# Patient Record
Sex: MALE | Race: WHITE | NOT HISPANIC OR LATINO
[De-identification: names, ages, dates, MRNs, and addresses within clinical notes are randomized per-mention and may not be internally consistent; named-entity substitution may affect disease eponyms.]

---

## 2019-05-07 ENCOUNTER — APPOINTMENT (OUTPATIENT)
Dept: INTERNAL MEDICINE | Facility: CLINIC | Age: 64
End: 2019-05-07

## 2019-06-11 ENCOUNTER — APPOINTMENT (OUTPATIENT)
Dept: INTERNAL MEDICINE | Facility: CLINIC | Age: 64
End: 2019-06-11

## 2019-06-11 DIAGNOSIS — Z00.00 ENCOUNTER FOR GENERAL ADULT MEDICAL EXAMINATION W/OUT ABNORMAL FINDINGS: ICD-10-CM

## 2019-07-08 ENCOUNTER — OTHER (OUTPATIENT)
Age: 64
End: 2019-07-08

## 2019-08-26 ENCOUNTER — MESSAGE (OUTPATIENT)
Age: 64
End: 2019-08-26

## 2021-12-22 ENCOUNTER — NON-APPOINTMENT (OUTPATIENT)
Age: 66
End: 2021-12-22

## 2021-12-22 ENCOUNTER — APPOINTMENT (OUTPATIENT)
Dept: FAMILY MEDICINE | Facility: CLINIC | Age: 66
End: 2021-12-22

## 2021-12-22 VITALS
OXYGEN SATURATION: 92 % | BODY MASS INDEX: 26.68 KG/M2 | HEART RATE: 117 BPM | WEIGHT: 197 LBS | HEIGHT: 72 IN | TEMPERATURE: 98.1 F

## 2021-12-22 NOTE — HISTORY OF PRESENT ILLNESS
[FreeTextEntry1] : Pt is here for CPE. [de-identified] : 66y M w/ PMHx former smoker- quit 3 years x 50 years, presenting for

## 2022-01-03 ENCOUNTER — APPOINTMENT (OUTPATIENT)
Dept: INTERNAL MEDICINE | Facility: CLINIC | Age: 67
End: 2022-01-03

## 2022-01-03 VITALS
BODY MASS INDEX: 27.36 KG/M2 | OXYGEN SATURATION: 92 % | HEIGHT: 72 IN | TEMPERATURE: 98.2 F | WEIGHT: 202 LBS | HEART RATE: 91 BPM

## 2022-03-03 ENCOUNTER — APPOINTMENT (OUTPATIENT)
Dept: ORTHOPEDIC SURGERY | Facility: CLINIC | Age: 67
End: 2022-03-03

## 2022-03-03 DIAGNOSIS — M25.569 PAIN IN UNSPECIFIED KNEE: ICD-10-CM

## 2022-03-14 ENCOUNTER — APPOINTMENT (OUTPATIENT)
Dept: ORTHOPEDIC SURGERY | Facility: CLINIC | Age: 67
End: 2022-03-14

## 2022-09-09 ENCOUNTER — APPOINTMENT (OUTPATIENT)
Dept: PULMONOLOGY | Facility: CLINIC | Age: 67
End: 2022-09-09

## 2022-09-09 VITALS
HEART RATE: 81 BPM | BODY MASS INDEX: 29.8 KG/M2 | WEIGHT: 220 LBS | RESPIRATION RATE: 16 BRPM | SYSTOLIC BLOOD PRESSURE: 140 MMHG | OXYGEN SATURATION: 93 % | HEIGHT: 72 IN | DIASTOLIC BLOOD PRESSURE: 80 MMHG

## 2022-09-09 DIAGNOSIS — J44.1 CHRONIC OBSTRUCTIVE PULMONARY DISEASE WITH (ACUTE) EXACERBATION: ICD-10-CM

## 2022-09-09 PROCEDURE — 99204 OFFICE O/P NEW MOD 45 MIN: CPT

## 2022-09-09 RX ORDER — FLUTICASONE FUROATE, UMECLIDINIUM BROMIDE AND VILANTEROL TRIFENATATE 100; 62.5; 25 UG/1; UG/1; UG/1
100-62.5-25 POWDER RESPIRATORY (INHALATION)
Qty: 1 | Refills: 5 | Status: ACTIVE | COMMUNITY
Start: 2022-09-09 | End: 1900-01-01

## 2022-09-09 RX ORDER — ALBUTEROL 90 MCG
90 AEROSOL (GRAM) INHALATION
Refills: 0 | Status: ACTIVE | COMMUNITY

## 2022-09-09 RX ORDER — AZITHROMYCIN 250 MG/1
250 TABLET, FILM COATED ORAL
Qty: 6 | Refills: 3 | Status: ACTIVE | COMMUNITY
Start: 2022-09-09 | End: 1900-01-01

## 2022-09-09 RX ORDER — PREDNISONE 5 MG/1
5 TABLET ORAL
Qty: 42 | Refills: 4 | Status: ACTIVE | COMMUNITY
Start: 2022-09-09 | End: 1900-01-01

## 2022-09-09 NOTE — ASSESSMENT
[FreeTextEntry1] : 67 yo man comes for first time without any information\par Partial discharge info available from [facility]\par Quite short of breath\par Patient pleasant, but a poor historian,\par \par Referred by Dr Bynum \par \par Smoker of 50 years until 4 years ago\par Had chest injury at work of some kind, but stopped smoking\par Has had multiple visits to [facility] and to [facility] ED for dyspnea, trated for COPD exacerbation\par \par CXR negative but CT chest had shown mediastinal adenopathy\par Dr Garcia, from IR at [facility] did EBUS and biopsies in Feb 2022--reportedly negative but will get path\par \par Taking Dulera???? perhaps  but doesn’t know\par albuterol MDI as well\par \par Denies any other medications\par Denies allergies\par \par \par Imp\par 67 yo man professed exsmoker treated for COPD in past\par Mediastinal adenopathy noted earlier this year but biopsies pending\par Only says he is taking dulera and albuterol\par \par Probable COPD\par COPD exacerbation \par o2 sat at rest 93%\par \par Recommend\par Eventual PFTs but must treat for COPD first\par Start Trelegy\par d/c dulera\par Albuterol PRN \par Azithro\par Prednisone taper\par RTC one week\par Patient says he will see Dr Bynum on Monday for eval and for blood work

## 2022-09-09 NOTE — HISTORY OF PRESENT ILLNESS
[TextBox_4] : 65 yo man comes for first time without any information\par Partial discharge info available from [facility]\par Quite short of breath\par Patient pleasant, but a poor historian,\par \par Referred by Dr Bynum \par \par Smoker of 50 years until 4 years ago\par Had chest injury at work of some kind, but stopped smoking\par Has had multiple visits to [facility] and to [facility] ED for dyspnea, trated for COPD exacerbation\par \par CXR negative but CT chest had shown mediastinal adenopathy\par Dr Garcia, from IR at [facility] did EBUS and biopsies in Feb 2022--reportedly negative but will get path\par \par Taking Dulera???? perhaps  but doesn’t know\par albuterol MDI as well\par \par Denies any other medications\par Denies allergies\par \par

## 2022-09-09 NOTE — CONSULT LETTER
[Dear  ___] : Dear  [unfilled], [FreeTextEntry1] : I had the pleasure of evaluating your patient, PANDA SIM , in the office today.  Please review my consultation and evaluation report that follows below.  Please do not hesitate to call me if further information is necessary or if you wish to discuss ongoing care or diagnostic work-up.   \par I very much appreciate your referral and it is a privilege to be able to provide care for your patient.\par \par Sincerely,\par  \par Chaz Keenan MD, MHCM, FACP, YVONNE-C\par Pulmonary Medicine\par  of Medicine\par [employer] [facility] [employer] [facility] [place]\par [email or web address]\par \par [facility] in [place]\par [address]\[place]  [address]\par    Fax \par \par [facility] [place]\par [address]\par [place], [place] \par \par

## 2022-09-09 NOTE — PHYSICAL EXAM
[Normal Oropharynx] : normal oropharynx [Normal Appearance] : normal appearance [No Neck Mass] : no neck mass [Normal Rate/Rhythm] : normal rate/rhythm [Normal S1, S2] : normal s1, s2 [No Murmurs] : no murmurs [No Resp Distress] : no resp distress [Clear to Auscultation Bilaterally] : clear to auscultation bilaterally [No Abnormalities] : no abnormalities [Benign] : benign [Normal Gait] : normal gait [No Clubbing] : no clubbing [No Cyanosis] : no cyanosis [No Edema] : no edema [FROM] : FROM [Normal Color/ Pigmentation] : normal color/ pigmentation [No Focal Deficits] : no focal deficits [Oriented x3] : oriented x3 [Normal Affect] : normal affect [TextBox_2] : Tachypneic but not wheezing, does calm down periodically

## 2022-09-16 ENCOUNTER — APPOINTMENT (OUTPATIENT)
Dept: PULMONOLOGY | Facility: CLINIC | Age: 67
End: 2022-09-16

## 2022-09-16 VITALS
HEART RATE: 85 BPM | DIASTOLIC BLOOD PRESSURE: 76 MMHG | RESPIRATION RATE: 16 BRPM | SYSTOLIC BLOOD PRESSURE: 136 MMHG | OXYGEN SATURATION: 94 %

## 2022-09-16 DIAGNOSIS — J44.9 CHRONIC OBSTRUCTIVE PULMONARY DISEASE, UNSPECIFIED: ICD-10-CM

## 2022-09-16 PROCEDURE — 99214 OFFICE O/P EST MOD 30 MIN: CPT

## 2022-09-16 NOTE — CONSULT LETTER
[Dear  ___] : Dear  [unfilled], [FreeTextEntry1] : I had the pleasure of evaluating your patient, PANDA SIM , in the office today.  Please review my consultation and evaluation report that follows below.  Please do not hesitate to call me if further information is necessary or if you wish to discuss ongoing care or diagnostic work-up.   \par I very much appreciate your referral and it is a privilege to be able to provide care for your patient.\par \par Sincerely,\par  \par Chaz Keenan MD, MHCM, FACP, YVONNE-C\par Pulmonary Medicine\par  of Medicine\par [employer] [facility] [employer] [facility]\par [email or web address]\par \par [facility] in [place]\par [address]\[place]  [address]\par    Fax \par \par [facility] at [place]\par [address]\par [place], [place] \par \par

## 2022-09-16 NOTE — HISTORY OF PRESENT ILLNESS
[TextBox_4] : 65 yo man smoker for 50 years until about 5 yrs ago\par From Dr Bynum\par Seen last week with probable exacerbation of COPD\par Treated with Trelegy, antibiotics and short steroids\par Definitely better today tho still has some dyspnea\par \par Mediastinal adenopathy noted earlier this year, Dr Garcia did EBUS at [facility]--final result was negative for lymphoma, sarcoid, flow cytometry nondiagnostic\par \par Had evaluation and blood work by Dr Bynum earlier this week--pending

## 2022-09-16 NOTE — ASSESSMENT
[FreeTextEntry1] : 67 yo man smoker for 50 years until about 5 yrs ago\par From Dr Bynum\par Seen last week with probable exacerbation of COPD\par Treated with Trelegy, antibiotics and short steroids\par Definitely better today tho still has some dyspnea\par \par Mediastinal adenopathy noted earlier this year, Dr Garcia did EBUS at [facility]--final result was negative for lymphoma, sarcoid, flow cytometry nondiagnostic\par \par Had evaluation and blood work by Dr Bynum earlier this week--pending\par \par Imp\par 67 yo exsmoker with probable COPD\par Better after steroids \par Stay on Trelegy\par PRN albuterol\par Retrun now for PFTs\par Mediastinal adenopathy of uncertain etiology\par Need f/u CT eventually\par Obtain lab work and other info from Dr Bynum

## 2022-10-31 ENCOUNTER — APPOINTMENT (OUTPATIENT)
Dept: PULMONOLOGY | Facility: CLINIC | Age: 67
End: 2022-10-31

## 2023-02-27 ENCOUNTER — EMERGENCY (EMERGENCY)
Facility: HOSPITAL | Age: 68
LOS: 1 days | Discharge: HOME | End: 2023-02-27
Attending: EMERGENCY MEDICINE | Admitting: EMERGENCY MEDICINE
Payer: MEDICARE

## 2023-02-27 VITALS
HEIGHT: 72 IN | DIASTOLIC BLOOD PRESSURE: 84 MMHG | OXYGEN SATURATION: 95 % | SYSTOLIC BLOOD PRESSURE: 135 MMHG | HEART RATE: 110 BPM | TEMPERATURE: 98 F | RESPIRATION RATE: 18 BRPM | WEIGHT: 139.99 LBS

## 2023-02-27 VITALS
OXYGEN SATURATION: 95 % | SYSTOLIC BLOOD PRESSURE: 139 MMHG | DIASTOLIC BLOOD PRESSURE: 85 MMHG | HEART RATE: 105 BPM | RESPIRATION RATE: 20 BRPM | TEMPERATURE: 99 F

## 2023-02-27 PROCEDURE — 71046 X-RAY EXAM CHEST 2 VIEWS: CPT | Mod: 26

## 2023-02-27 PROCEDURE — 73110 X-RAY EXAM OF WRIST: CPT

## 2023-02-27 PROCEDURE — 99284 EMERGENCY DEPT VISIT MOD MDM: CPT

## 2023-02-27 PROCEDURE — 73130 X-RAY EXAM OF HAND: CPT

## 2023-02-27 PROCEDURE — 73110 X-RAY EXAM OF WRIST: CPT | Mod: 26,RT

## 2023-02-27 PROCEDURE — 99284 EMERGENCY DEPT VISIT MOD MDM: CPT | Mod: 25

## 2023-02-27 PROCEDURE — 73130 X-RAY EXAM OF HAND: CPT | Mod: 26,RT

## 2023-02-27 PROCEDURE — 71046 X-RAY EXAM CHEST 2 VIEWS: CPT

## 2023-02-27 NOTE — ED PROVIDER NOTE - CLINICAL SUMMARY MEDICAL DECISION MAKING FREE TEXT BOX
Patient is a 67-year-old male with multiple hospitalizations for COPD exacerbation and hypoxia [facility].  He works as a  part-time, is a remote former smoker and is oxygen dependent at home.  He follows with a pulmonologist and a primary care physician both at [facility].  He presents the emergency room today with a chief complaint of pain swelling and numbness about the right wrist on the ulnar aspect of the wrist.  He denies any recent trauma or injury.  He denies any fevers or chills.  He denies any cough.  He states his dyspnea and breathlessness are at his baseline and is declining any further care or evaluation of his shortness of breath.    On evaluation patient appears dyspneic sitting forward with pursed lip excursions with COPD appearance.  He declined facemask BiPAP, or other intervention or admission.  Stating I am always like this.  He is only asking for evaluation of his hand at this time.  Aside from the pursed lip excursions.  His lungs have good aeration with good air movement.  His pulse ox was 90-95 depending on the flow rate of the oxygen via nasal cannula.  He has no JVD.  His extremities reveal tenderness and swelling over the ulnar aspect of the wrist without evidence of infection.  He has pain on range of motion.  Pain on extension of the fourth and fifth fingers.  With concern for possible flexor contractures secondary to neurologic injury or insult patient was counseled he needs to follow-up with orthopedics.  He has negative Tinel's at his elbow or his wrist.  Negative Phalen's.  The cardiac exam is normal.  Abdominal exam is soft nontender.  The spine exam is severe kyphosis.  He has no calf pain or calf tenderness or calf swelling.    PET plan of care includes a offered to admit the patient for his severe dyspnea which she declines, oxygen therapy patient has a home oxygen concentrator and oxygen with him.  X-ray of the hand and wrist to rule out occult fracture.  X-ray reveals osteopenia and arthritis.  Without fracture.  This could be inflammatory versus arthritic pain.  Will provide steroid course for his pain, and for his breathing, Velcro cock-up wrist splint, referral to hand surgery.  Analgesics as required.  With the extreme caution with any opioid type and analgesia given his fragile  respiratory status.  This chart was made with dictation software and may contain typographical errors.

## 2023-02-27 NOTE — ED PROVIDER NOTE - OBJECTIVE STATEMENT
68 yo M PMHx COPD on 2-3L home O2 presents to ED c/o chronic R hand pain x weeks. Pt states he works as a . Denies specific trauma. Reports intermittent pain and tingling sensation to medial aspect of hand. Pt also noted to be tachypneic. States this is his baseline and that he feels well other than his hand pain. Pt denies chest pain/tightness, back pain, fever/chills, acute cough, N/V.

## 2023-02-27 NOTE — ED ADULT NURSE NOTE - OBJECTIVE STATEMENT
68y/o male A&ox4, ambulatory received in rm7a. pt c/o R hand pain/swelling since friday. denies recent injury, fall. no obvious injury noted at this time. pmhx copd, on home o2 2L nc at baseline. pt c/o PAREDES but states this is baseline. pt visibly tachypneic at this time. satting 95% on o2 at this time. MD at bedside for eval. bed in lowest position, side rails up, call bell in hand, safety maintained. awaiting further orders. will continue to monitor.

## 2023-02-27 NOTE — ED PROVIDER NOTE - PATIENT PORTAL LINK FT
You can access the FollowMyHealth Patient Portal offered by [facility] by registering at the following website: [email or web address]. By joining [facility]’s FollowMyHealth portal, you will also be able to view your health information using other applications (apps) compatible with our system.

## 2023-02-27 NOTE — ED PROVIDER NOTE - NSFOLLOWUPINSTRUCTIONS_ED_ALL_ED_FT
1) Follow up with ORTHO/HAND and PULMONOLOGY in 1-2 days.  2) Return to the ED immediately for new or worsening symptoms as we discussed.                                                             Hand Pain      Many things can cause hand pain. Some common causes are:  •An injury.      •Repeating the same movement with your hand over and over (overuse).      •Osteoporosis.      •Arthritis.      •Lumps in the tendons or joints of the hand and wrist (ganglion cysts).      •Nerve compression syndromes (carpal tunnel syndrome).      •Inflammation of the tendons (tendinitis).      •Infection.        Follow these instructions at home:    Pay attention to any changes in your symptoms. Take these actions to help with your discomfort:      Managing pain, stiffness, and swelling   A bag of ice on a towel on the skin.   •Take over-the-counter and prescription medicines only as told by your health care provider.      •Wear a hand splint or support as told by your health care provider.    •If directed, put ice on the affected area:  •Put ice in a plastic bag.      •Place a towel between your skin and the bag.      •Leave the ice on for 20 minutes, 2–3 times a day.        Activity     •Take breaks from repetitive activity often.      •Avoid activities that make your pain worse.      •Minimize stress on your hands and wrists as much as possible.      •Do stretches or exercises as told by your health care provider.      • Do not do activities that make your pain worse.        Contact a health care provider if:    •Your pain does not get better after a few days of self-care.      •Your pain gets worse.      •Your pain affects your ability to do your daily activities.        Get help right away if:    •Your hand becomes warm, red, or swollen.      •Your hand is numb or tingling.      •Your hand is extremely swollen or deformed.      •Your hand or fingers turn white or blue.      •You cannot move your hand, wrist, or fingers.        Summary    •Many things can cause hand pain.      •Contact your health care provider if your pain does not get better after a few days of self care.      •Minimize stress on your hands and wrists as much as possible.      • Do not do activities that make your pain worse.      This information is not intended to replace advice given to you by your health care provider. Make sure you discuss any questions you have with your health care provider.      Document Revised: 04/07/2022 Document Reviewed: 04/07/2022    Elsevier Patient Education © 2023 Elsevier Inc.

## 2023-02-27 NOTE — ED ADULT TRIAGE NOTE - CHIEF COMPLAINT QUOTE
patient ambulatory to ED A&Ox4 with c/o atraumatic right hand pain since friday and woke up with it numb today. pt with chronic o2 appears SOB but states this is normal for him. currently on 2L NC o2 sat 95

## 2023-02-27 NOTE — ED PROVIDER NOTE - PROGRESS NOTE DETAILS
pt refusing EKG, further workup of dyspnea, understands risks. see attending note.  pt placed in velcro splint for R hand  Pt was given an opportunity to have all questions answered to satisfaction.  Discussed the importance of prompt, close medical follow-up. ED return precautions discussed at length.  Pt verbalizes agreement and understanding of plan and ED return precautions. Pt well appearing, stable for DC home. No emergent concerns at this time.

## 2023-02-27 NOTE — ED PROVIDER NOTE - CARE PROVIDER_API CALL
Andrea Rodríguez)  Orthopaedic Surgery; Surgery of the Hand  [address] [place] [address]  Phone: [phone]  Fax: [phone]  Follow Up Time:

## 2023-02-27 NOTE — ED PROVIDER NOTE - ATTENDING APP SHARED VISIT CONTRIBUTION OF CARE
Patient is a 67-year-old male with multiple hospitalizations for COPD exacerbation and hypoxia [facility].  He works as a  part-time, is a remote former smoker and is oxygen dependent at home.  He follows with a pulmonologist and a primary care physician both at [facility].  He presents the emergency room today with a chief complaint of pain swelling and numbness about the right wrist on the ulnar aspect of the wrist.  He denies any recent trauma or injury.  He denies any fevers or chills.  He denies any cough.  He states his dyspnea and breathlessness are at his baseline and is declining any further care or evaluation of his shortness of breath.    On evaluation patient appears dyspneic sitting forward with pursed lip excursions with COPD appearance.  He declined facemask BiPAP, or other intervention or admission.  Stating I am always like this.  He is only asking for evaluation of his hand at this time.  Aside from the pursed lip excursions.  His lungs have good aeration with good air movement.  His pulse ox was 90-95 depending on the flow rate of the oxygen via nasal cannula.  He has no JVD.  His extremities reveal tenderness and swelling over the ulnar aspect of the wrist without evidence of infection.  He has pain on range of motion.  Pain on extension of the fourth and fifth fingers.  With concern for possible flexor contractures secondary to neurologic injury or insult patient was counseled he needs to follow-up with orthopedics.  He has negative Tinel's at his elbow or his wrist.  Negative Phalen's.  The cardiac exam is normal.  Abdominal exam is soft nontender.  The spine exam is severe kyphosis.  He has no calf pain or calf tenderness or calf swelling.    PET plan of care includes a offered to admit the patient for his severe dyspnea which she declines, oxygen therapy patient has a home oxygen concentrator and oxygen with him.  X-ray of the hand and wrist to rule out occult fracture.  X-ray reveals osteopenia and arthritis.  Without fracture.  This could be inflammatory versus arthritic pain.  Will provide steroid course for his pain, and for his breathing, Velcro cock-up wrist splint, referral to hand surgery.  Analgesics as required.  With the extreme caution with any opioid type and analgesia given his fragile  respiratory status.  This chart was made with dictation software and may contain typographical errors.

## 2023-03-02 ENCOUNTER — APPOINTMENT (OUTPATIENT)
Dept: ORTHOPEDIC SURGERY | Facility: CLINIC | Age: 68
End: 2023-03-02
Payer: MEDICARE

## 2023-03-02 VITALS — HEIGHT: 72 IN | WEIGHT: 230 LBS | BODY MASS INDEX: 31.15 KG/M2

## 2023-03-02 DIAGNOSIS — J45.909 UNSPECIFIED ASTHMA, UNCOMPLICATED: ICD-10-CM

## 2023-03-02 PROBLEM — J44.9 CHRONIC OBSTRUCTIVE PULMONARY DISEASE, UNSPECIFIED: Chronic | Status: ACTIVE | Noted: 2023-02-27

## 2023-03-02 PROCEDURE — 99203 OFFICE O/P NEW LOW 30 MIN: CPT

## 2023-03-02 NOTE — HISTORY OF PRESENT ILLNESS
[Gradual] : gradual [4] : 4 [2] : 2 [Dull/Aching] : dull/aching [Tingling] : tingling [Nothing helps with pain getting better] : Nothing helps with pain getting better [de-identified] : R hand swelling that comes/goes over the year\par Has SF and ulnar hand numbness\par \par  [] : no [FreeTextEntry1] : right hand/ SF  [FreeTextEntry3] : a year  [FreeTextEntry5] : he has pain, numbness and swelling, no injury  [FreeTextEntry6] : numbness [de-identified] : activity

## 2023-03-02 NOTE — PHYSICAL EXAM
[de-identified] : R elbow:\par Tender at the ulna nerve\par Pain with flexion\par +tinels at the ulna nerve\par +hyperflexion test\par \par R hand:\par Intrinsic weakness\par Decreased sensation in the ulna nerve distribution\par +lumbrical atrophy\par

## 2023-03-09 ENCOUNTER — APPOINTMENT (OUTPATIENT)
Dept: NEUROLOGY | Facility: CLINIC | Age: 68
End: 2023-03-09
Payer: MEDICARE

## 2023-03-09 DIAGNOSIS — R20.2 ANESTHESIA OF SKIN: ICD-10-CM

## 2023-03-09 DIAGNOSIS — G56.01 CARPAL TUNNEL SYNDROME, RIGHT UPPER LIMB: ICD-10-CM

## 2023-03-09 DIAGNOSIS — R20.0 ANESTHESIA OF SKIN: ICD-10-CM

## 2023-03-09 DIAGNOSIS — G56.21 LESION OF ULNAR NERVE, RIGHT UPPER LIMB: ICD-10-CM

## 2023-03-09 PROCEDURE — 95910 NRV CNDJ TEST 7-8 STUDIES: CPT

## 2023-03-09 PROCEDURE — 95886 MUSC TEST DONE W/N TEST COMP: CPT

## 2023-03-23 ENCOUNTER — APPOINTMENT (OUTPATIENT)
Dept: ORTHOPEDIC SURGERY | Facility: CLINIC | Age: 68
End: 2023-03-23

## 2023-11-28 ENCOUNTER — OFFICE (OUTPATIENT)
Dept: URBAN - METROPOLITAN AREA CLINIC 63 | Facility: CLINIC | Age: 68
Setting detail: OPHTHALMOLOGY
End: 2023-11-28
Payer: MEDICARE

## 2023-11-28 DIAGNOSIS — H25.12: ICD-10-CM

## 2023-11-28 DIAGNOSIS — H11.153: ICD-10-CM

## 2023-11-28 DIAGNOSIS — H25.13: ICD-10-CM

## 2023-11-28 DIAGNOSIS — H25.11: ICD-10-CM

## 2023-11-28 PROCEDURE — 92014 COMPRE OPH EXAM EST PT 1/>: CPT | Performed by: STUDENT IN AN ORGANIZED HEALTH CARE EDUCATION/TRAINING PROGRAM

## 2023-11-28 ASSESSMENT — CONFRONTATIONAL VISUAL FIELD TEST (CVF)
OS_FINDINGS: FULL
OD_FINDINGS: FULL

## 2023-11-28 ASSESSMENT — REFRACTION_MANIFEST
OS_VA1: 20/50
OS_SPHERE: -1.25
OU_VA: 20/50
OD_CYLINDER: -2.25
OD_CYLINDER: -1.75
OD_ADD: +2.50
OD_VA2: 20/20
OD_VA2: 20/25(J1)
OS_AXIS: 080
OD_SPHERE: -1.50
OD_VA1: 20/150
OS_AXIS: 081
OS_ADD: +2.50
OS_VA1: 20/70
OD_SPHERE: -1.50
OD_CYLINDER: -1.75
OS_VA2: 20/25(J1)
OD_SPHERE: -1.50
OD_ADD: +3.00
OS_AXIS: 080
OU_VA: 20/40
OD_VA1: 20/50
OD_AXIS: 100
OS_CYLINDER: -1.50
OS_VA2: 20/20
OD_AXIS: 100
OS_VA1: 20/50
OS_SPHERE: -0.75
OS_ADD: +3.00
OS_CYLINDER: -0.75
OS_CYLINDER: -1.50
OD_AXIS: 104
OS_SPHERE: -0.50
OD_VA1: 20/40

## 2023-11-28 ASSESSMENT — REFRACTION_CURRENTRX
OD_AXIS: 103
OD_OVR_VA: 20/
OD_CYLINDER: -1.75
OS_CYLINDER: -1.50
OS_VPRISM_DIRECTION: SV
OS_AXIS: 081
OS_OVR_VA: 20/
OS_SPHERE: -0.75
OD_SPHERE: -1.50
OD_VPRISM_DIRECTION: SV

## 2023-11-28 ASSESSMENT — REFRACTION_AUTOREFRACTION
OD_CYLINDER: -2.25
OD_SPHERE: -1.50
OS_CYLINDER: -0.75
OS_SPHERE: -1.25
OD_AXIS: 104
OS_AXIS: 081

## 2023-11-28 ASSESSMENT — SPHEQUIV_DERIVED
OD_SPHEQUIV: -2.375
OD_SPHEQUIV: -2.625
OS_SPHEQUIV: -1.5
OD_SPHEQUIV: -2.375
OS_SPHEQUIV: -1.625
OS_SPHEQUIV: -1.25
OD_SPHEQUIV: -2.625
OS_SPHEQUIV: -1.625

## 2024-01-09 ENCOUNTER — INPATIENT (INPATIENT)
Facility: HOSPITAL | Age: 69
LOS: 1 days | Discharge: HOME | DRG: 189 | End: 2024-01-11
Attending: INTERNAL MEDICINE | Admitting: INTERNAL MEDICINE
Payer: MEDICARE

## 2024-01-09 VITALS — OXYGEN SATURATION: 98 % | HEART RATE: 130 BPM

## 2024-01-09 DIAGNOSIS — J44.1 CHRONIC OBSTRUCTIVE PULMONARY DISEASE WITH (ACUTE) EXACERBATION: ICD-10-CM

## 2024-01-09 DIAGNOSIS — J96.01 ACUTE RESPIRATORY FAILURE WITH HYPOXIA: ICD-10-CM

## 2024-01-09 DIAGNOSIS — J96.21 ACUTE AND CHRONIC RESPIRATORY FAILURE WITH HYPOXIA: ICD-10-CM

## 2024-01-09 DIAGNOSIS — J18.9 PNEUMONIA, UNSPECIFIED ORGANISM: ICD-10-CM

## 2024-01-09 DIAGNOSIS — Z29.9 ENCOUNTER FOR PROPHYLACTIC MEASURES, UNSPECIFIED: ICD-10-CM

## 2024-01-09 LAB
ALBUMIN SERPL ELPH-MCNC: 3.2 G/DL — LOW (ref 3.3–5)
ALBUMIN SERPL ELPH-MCNC: 3.2 G/DL — LOW (ref 3.3–5)
ALP SERPL-CCNC: 92 U/L — SIGNIFICANT CHANGE UP (ref 40–120)
ALP SERPL-CCNC: 92 U/L — SIGNIFICANT CHANGE UP (ref 40–120)
ALT FLD-CCNC: 38 U/L — SIGNIFICANT CHANGE UP (ref 12–78)
ALT FLD-CCNC: 38 U/L — SIGNIFICANT CHANGE UP (ref 12–78)
ANION GAP SERPL CALC-SCNC: 6 MMOL/L — SIGNIFICANT CHANGE UP (ref 5–17)
ANION GAP SERPL CALC-SCNC: 6 MMOL/L — SIGNIFICANT CHANGE UP (ref 5–17)
APTT BLD: 34.7 SEC — SIGNIFICANT CHANGE UP (ref 24.5–35.6)
APTT BLD: 34.7 SEC — SIGNIFICANT CHANGE UP (ref 24.5–35.6)
AST SERPL-CCNC: 27 U/L — SIGNIFICANT CHANGE UP (ref 15–37)
AST SERPL-CCNC: 27 U/L — SIGNIFICANT CHANGE UP (ref 15–37)
BASE EXCESS BLDA CALC-SCNC: 1.2 MMOL/L — SIGNIFICANT CHANGE UP (ref -2–3)
BASE EXCESS BLDA CALC-SCNC: 1.2 MMOL/L — SIGNIFICANT CHANGE UP (ref -2–3)
BASE EXCESS BLDV CALC-SCNC: 1.5 MMOL/L — SIGNIFICANT CHANGE UP (ref -2–3)
BASE EXCESS BLDV CALC-SCNC: 1.5 MMOL/L — SIGNIFICANT CHANGE UP (ref -2–3)
BASOPHILS # BLD AUTO: 0.06 K/UL — SIGNIFICANT CHANGE UP (ref 0–0.2)
BASOPHILS # BLD AUTO: 0.06 K/UL — SIGNIFICANT CHANGE UP (ref 0–0.2)
BASOPHILS NFR BLD AUTO: 0.3 % — SIGNIFICANT CHANGE UP (ref 0–2)
BASOPHILS NFR BLD AUTO: 0.3 % — SIGNIFICANT CHANGE UP (ref 0–2)
BILIRUB SERPL-MCNC: 0.6 MG/DL — SIGNIFICANT CHANGE UP (ref 0.2–1.2)
BILIRUB SERPL-MCNC: 0.6 MG/DL — SIGNIFICANT CHANGE UP (ref 0.2–1.2)
BLOOD GAS COMMENTS ARTERIAL: SIGNIFICANT CHANGE UP
BLOOD GAS COMMENTS ARTERIAL: SIGNIFICANT CHANGE UP
BLOOD GAS COMMENTS, VENOUS: SIGNIFICANT CHANGE UP
BLOOD GAS COMMENTS, VENOUS: SIGNIFICANT CHANGE UP
BUN SERPL-MCNC: 20 MG/DL — SIGNIFICANT CHANGE UP (ref 7–23)
BUN SERPL-MCNC: 20 MG/DL — SIGNIFICANT CHANGE UP (ref 7–23)
CALCIUM SERPL-MCNC: 8.7 MG/DL — SIGNIFICANT CHANGE UP (ref 8.5–10.1)
CALCIUM SERPL-MCNC: 8.7 MG/DL — SIGNIFICANT CHANGE UP (ref 8.5–10.1)
CHLORIDE SERPL-SCNC: 107 MMOL/L — SIGNIFICANT CHANGE UP (ref 96–108)
CHLORIDE SERPL-SCNC: 107 MMOL/L — SIGNIFICANT CHANGE UP (ref 96–108)
CK SERPL-CCNC: 126 U/L — SIGNIFICANT CHANGE UP (ref 26–308)
CK SERPL-CCNC: 126 U/L — SIGNIFICANT CHANGE UP (ref 26–308)
CO2 SERPL-SCNC: 25 MMOL/L — SIGNIFICANT CHANGE UP (ref 22–31)
CO2 SERPL-SCNC: 25 MMOL/L — SIGNIFICANT CHANGE UP (ref 22–31)
CREAT SERPL-MCNC: 1 MG/DL — SIGNIFICANT CHANGE UP (ref 0.5–1.3)
CREAT SERPL-MCNC: 1 MG/DL — SIGNIFICANT CHANGE UP (ref 0.5–1.3)
D DIMER BLD IA.RAPID-MCNC: 267 NG/ML DDU — HIGH
D DIMER BLD IA.RAPID-MCNC: 267 NG/ML DDU — HIGH
EGFR: 81 ML/MIN/1.73M2 — SIGNIFICANT CHANGE UP
EGFR: 81 ML/MIN/1.73M2 — SIGNIFICANT CHANGE UP
EOSINOPHIL # BLD AUTO: 0.13 K/UL — SIGNIFICANT CHANGE UP (ref 0–0.5)
EOSINOPHIL # BLD AUTO: 0.13 K/UL — SIGNIFICANT CHANGE UP (ref 0–0.5)
EOSINOPHIL NFR BLD AUTO: 0.7 % — SIGNIFICANT CHANGE UP (ref 0–6)
EOSINOPHIL NFR BLD AUTO: 0.7 % — SIGNIFICANT CHANGE UP (ref 0–6)
GAS PNL BLDA: SIGNIFICANT CHANGE UP
GAS PNL BLDA: SIGNIFICANT CHANGE UP
GAS PNL BLDV: SIGNIFICANT CHANGE UP
GAS PNL BLDV: SIGNIFICANT CHANGE UP
GLUCOSE SERPL-MCNC: 134 MG/DL — HIGH (ref 70–99)
GLUCOSE SERPL-MCNC: 134 MG/DL — HIGH (ref 70–99)
HCO3 BLDA-SCNC: 26 MMOL/L — SIGNIFICANT CHANGE UP (ref 21–28)
HCO3 BLDA-SCNC: 26 MMOL/L — SIGNIFICANT CHANGE UP (ref 21–28)
HCO3 BLDV-SCNC: 27 MMOL/L — SIGNIFICANT CHANGE UP (ref 22–29)
HCO3 BLDV-SCNC: 27 MMOL/L — SIGNIFICANT CHANGE UP (ref 22–29)
HCOV PNL SPEC NAA+PROBE: DETECTED
HCOV PNL SPEC NAA+PROBE: DETECTED
HCT VFR BLD CALC: 39.8 % — SIGNIFICANT CHANGE UP (ref 39–50)
HCT VFR BLD CALC: 39.8 % — SIGNIFICANT CHANGE UP (ref 39–50)
HGB BLD-MCNC: 13.2 G/DL — SIGNIFICANT CHANGE UP (ref 13–17)
HGB BLD-MCNC: 13.2 G/DL — SIGNIFICANT CHANGE UP (ref 13–17)
IMM GRANULOCYTES NFR BLD AUTO: 0.4 % — SIGNIFICANT CHANGE UP (ref 0–0.9)
IMM GRANULOCYTES NFR BLD AUTO: 0.4 % — SIGNIFICANT CHANGE UP (ref 0–0.9)
INR BLD: 1.05 RATIO — SIGNIFICANT CHANGE UP (ref 0.85–1.18)
INR BLD: 1.05 RATIO — SIGNIFICANT CHANGE UP (ref 0.85–1.18)
LACTATE SERPL-SCNC: 1.6 MMOL/L — SIGNIFICANT CHANGE UP (ref 0.7–2)
LACTATE SERPL-SCNC: 1.6 MMOL/L — SIGNIFICANT CHANGE UP (ref 0.7–2)
LYMPHOCYTES # BLD AUTO: 1.75 K/UL — SIGNIFICANT CHANGE UP (ref 1–3.3)
LYMPHOCYTES # BLD AUTO: 1.75 K/UL — SIGNIFICANT CHANGE UP (ref 1–3.3)
LYMPHOCYTES # BLD AUTO: 10 % — LOW (ref 13–44)
LYMPHOCYTES # BLD AUTO: 10 % — LOW (ref 13–44)
MAGNESIUM SERPL-MCNC: 2 MG/DL — SIGNIFICANT CHANGE UP (ref 1.6–2.6)
MAGNESIUM SERPL-MCNC: 2 MG/DL — SIGNIFICANT CHANGE UP (ref 1.6–2.6)
MCHC RBC-ENTMCNC: 29 PG — SIGNIFICANT CHANGE UP (ref 27–34)
MCHC RBC-ENTMCNC: 29 PG — SIGNIFICANT CHANGE UP (ref 27–34)
MCHC RBC-ENTMCNC: 33.2 GM/DL — SIGNIFICANT CHANGE UP (ref 32–36)
MCHC RBC-ENTMCNC: 33.2 GM/DL — SIGNIFICANT CHANGE UP (ref 32–36)
MCV RBC AUTO: 87.5 FL — SIGNIFICANT CHANGE UP (ref 80–100)
MCV RBC AUTO: 87.5 FL — SIGNIFICANT CHANGE UP (ref 80–100)
MONOCYTES # BLD AUTO: 0.98 K/UL — HIGH (ref 0–0.9)
MONOCYTES # BLD AUTO: 0.98 K/UL — HIGH (ref 0–0.9)
MONOCYTES NFR BLD AUTO: 5.6 % — SIGNIFICANT CHANGE UP (ref 2–14)
MONOCYTES NFR BLD AUTO: 5.6 % — SIGNIFICANT CHANGE UP (ref 2–14)
NEUTROPHILS # BLD AUTO: 14.43 K/UL — HIGH (ref 1.8–7.4)
NEUTROPHILS # BLD AUTO: 14.43 K/UL — HIGH (ref 1.8–7.4)
NEUTROPHILS NFR BLD AUTO: 83 % — HIGH (ref 43–77)
NEUTROPHILS NFR BLD AUTO: 83 % — HIGH (ref 43–77)
NRBC # BLD: 0 /100 WBCS — SIGNIFICANT CHANGE UP (ref 0–0)
NRBC # BLD: 0 /100 WBCS — SIGNIFICANT CHANGE UP (ref 0–0)
NT-PROBNP SERPL-SCNC: 32 PG/ML — SIGNIFICANT CHANGE UP (ref 0–125)
NT-PROBNP SERPL-SCNC: 32 PG/ML — SIGNIFICANT CHANGE UP (ref 0–125)
PCO2 BLDA: 42 MMHG — SIGNIFICANT CHANGE UP (ref 35–48)
PCO2 BLDA: 42 MMHG — SIGNIFICANT CHANGE UP (ref 35–48)
PCO2 BLDV: 45 MMHG — SIGNIFICANT CHANGE UP (ref 42–55)
PCO2 BLDV: 45 MMHG — SIGNIFICANT CHANGE UP (ref 42–55)
PH BLDA: 7.4 — SIGNIFICANT CHANGE UP (ref 7.35–7.45)
PH BLDA: 7.4 — SIGNIFICANT CHANGE UP (ref 7.35–7.45)
PH BLDV: 7.38 — SIGNIFICANT CHANGE UP (ref 7.32–7.43)
PH BLDV: 7.38 — SIGNIFICANT CHANGE UP (ref 7.32–7.43)
PLATELET # BLD AUTO: 193 K/UL — SIGNIFICANT CHANGE UP (ref 150–400)
PLATELET # BLD AUTO: 193 K/UL — SIGNIFICANT CHANGE UP (ref 150–400)
PO2 BLDA: 179 MMHG — HIGH (ref 83–108)
PO2 BLDA: 179 MMHG — HIGH (ref 83–108)
PO2 BLDV: 208 MMHG — HIGH (ref 25–45)
PO2 BLDV: 208 MMHG — HIGH (ref 25–45)
POTASSIUM SERPL-MCNC: 4.1 MMOL/L — SIGNIFICANT CHANGE UP (ref 3.5–5.3)
POTASSIUM SERPL-MCNC: 4.1 MMOL/L — SIGNIFICANT CHANGE UP (ref 3.5–5.3)
POTASSIUM SERPL-SCNC: 4.1 MMOL/L — SIGNIFICANT CHANGE UP (ref 3.5–5.3)
POTASSIUM SERPL-SCNC: 4.1 MMOL/L — SIGNIFICANT CHANGE UP (ref 3.5–5.3)
PROT SERPL-MCNC: 7.3 G/DL — SIGNIFICANT CHANGE UP (ref 6–8.3)
PROT SERPL-MCNC: 7.3 G/DL — SIGNIFICANT CHANGE UP (ref 6–8.3)
PROTHROM AB SERPL-ACNC: 12.3 SEC — SIGNIFICANT CHANGE UP (ref 9.5–13)
PROTHROM AB SERPL-ACNC: 12.3 SEC — SIGNIFICANT CHANGE UP (ref 9.5–13)
RAPID RVP RESULT: DETECTED
RAPID RVP RESULT: DETECTED
RBC # BLD: 4.55 M/UL — SIGNIFICANT CHANGE UP (ref 4.2–5.8)
RBC # BLD: 4.55 M/UL — SIGNIFICANT CHANGE UP (ref 4.2–5.8)
RBC # FLD: 15.2 % — HIGH (ref 10.3–14.5)
RBC # FLD: 15.2 % — HIGH (ref 10.3–14.5)
SAO2 % BLDA: 99.9 % — HIGH (ref 94–98)
SAO2 % BLDA: 99.9 % — HIGH (ref 94–98)
SAO2 % BLDV: 99.4 % — HIGH (ref 67–88)
SAO2 % BLDV: 99.4 % — HIGH (ref 67–88)
SARS-COV-2 RNA SPEC QL NAA+PROBE: SIGNIFICANT CHANGE UP
SARS-COV-2 RNA SPEC QL NAA+PROBE: SIGNIFICANT CHANGE UP
SODIUM SERPL-SCNC: 138 MMOL/L — SIGNIFICANT CHANGE UP (ref 135–145)
SODIUM SERPL-SCNC: 138 MMOL/L — SIGNIFICANT CHANGE UP (ref 135–145)
TROPONIN I, HIGH SENSITIVITY RESULT: 7.6 NG/L — SIGNIFICANT CHANGE UP
TROPONIN I, HIGH SENSITIVITY RESULT: 7.6 NG/L — SIGNIFICANT CHANGE UP
WBC # BLD: 17.42 K/UL — HIGH (ref 3.8–10.5)
WBC # BLD: 17.42 K/UL — HIGH (ref 3.8–10.5)
WBC # FLD AUTO: 17.42 K/UL — HIGH (ref 3.8–10.5)
WBC # FLD AUTO: 17.42 K/UL — HIGH (ref 3.8–10.5)

## 2024-01-09 PROCEDURE — 99291 CRITICAL CARE FIRST HOUR: CPT

## 2024-01-09 PROCEDURE — 71045 X-RAY EXAM CHEST 1 VIEW: CPT | Mod: 26

## 2024-01-09 PROCEDURE — 99223 1ST HOSP IP/OBS HIGH 75: CPT | Mod: GC

## 2024-01-09 PROCEDURE — 93010 ELECTROCARDIOGRAM REPORT: CPT

## 2024-01-09 RX ORDER — LANOLIN ALCOHOL/MO/W.PET/CERES
3 CREAM (GRAM) TOPICAL AT BEDTIME
Refills: 0 | Status: DISCONTINUED | OUTPATIENT
Start: 2024-01-09 | End: 2024-01-11

## 2024-01-09 RX ORDER — INFLUENZA VIRUS VACCINE 15; 15; 15; 15 UG/.5ML; UG/.5ML; UG/.5ML; UG/.5ML
0.7 SUSPENSION INTRAMUSCULAR ONCE
Refills: 0 | Status: DISCONTINUED | OUTPATIENT
Start: 2024-01-09 | End: 2024-01-11

## 2024-01-09 RX ORDER — CEFTRIAXONE 500 MG/1
1000 INJECTION, POWDER, FOR SOLUTION INTRAMUSCULAR; INTRAVENOUS ONCE
Refills: 0 | Status: COMPLETED | OUTPATIENT
Start: 2024-01-09 | End: 2024-01-09

## 2024-01-09 RX ORDER — ENOXAPARIN SODIUM 100 MG/ML
40 INJECTION SUBCUTANEOUS EVERY 24 HOURS
Refills: 0 | Status: DISCONTINUED | OUTPATIENT
Start: 2024-01-09 | End: 2024-01-11

## 2024-01-09 RX ORDER — BUDESONIDE AND FORMOTEROL FUMARATE DIHYDRATE 160; 4.5 UG/1; UG/1
2 AEROSOL RESPIRATORY (INHALATION)
Refills: 0 | Status: DISCONTINUED | OUTPATIENT
Start: 2024-01-09 | End: 2024-01-11

## 2024-01-09 RX ORDER — CEFTRIAXONE 500 MG/1
1000 INJECTION, POWDER, FOR SOLUTION INTRAMUSCULAR; INTRAVENOUS EVERY 24 HOURS
Refills: 0 | Status: DISCONTINUED | OUTPATIENT
Start: 2024-01-10 | End: 2024-01-10

## 2024-01-09 RX ORDER — AZITHROMYCIN 500 MG/1
500 TABLET, FILM COATED ORAL EVERY 24 HOURS
Refills: 0 | Status: DISCONTINUED | OUTPATIENT
Start: 2024-01-10 | End: 2024-01-10

## 2024-01-09 RX ORDER — AZITHROMYCIN 500 MG/1
500 TABLET, FILM COATED ORAL ONCE
Refills: 0 | Status: COMPLETED | OUTPATIENT
Start: 2024-01-09 | End: 2024-01-09

## 2024-01-09 RX ORDER — IPRATROPIUM/ALBUTEROL SULFATE 18-103MCG
3 AEROSOL WITH ADAPTER (GRAM) INHALATION EVERY 6 HOURS
Refills: 0 | Status: DISCONTINUED | OUTPATIENT
Start: 2024-01-09 | End: 2024-01-11

## 2024-01-09 RX ORDER — IPRATROPIUM/ALBUTEROL SULFATE 18-103MCG
3 AEROSOL WITH ADAPTER (GRAM) INHALATION
Refills: 0 | Status: COMPLETED | OUTPATIENT
Start: 2024-01-09 | End: 2024-01-09

## 2024-01-09 RX ORDER — ACETAMINOPHEN 500 MG
650 TABLET ORAL EVERY 6 HOURS
Refills: 0 | Status: DISCONTINUED | OUTPATIENT
Start: 2024-01-09 | End: 2024-01-11

## 2024-01-09 RX ORDER — MAGNESIUM SULFATE 500 MG/ML
2 VIAL (ML) INJECTION ONCE
Refills: 0 | Status: COMPLETED | OUTPATIENT
Start: 2024-01-09 | End: 2024-01-09

## 2024-01-09 RX ADMIN — Medication 200 MILLIGRAM(S): at 23:38

## 2024-01-09 RX ADMIN — AZITHROMYCIN 255 MILLIGRAM(S): 500 TABLET, FILM COATED ORAL at 15:09

## 2024-01-09 RX ADMIN — Medication 3 MILLILITER(S): at 19:44

## 2024-01-09 RX ADMIN — Medication 3 MILLILITER(S): at 14:25

## 2024-01-09 RX ADMIN — Medication 3 MILLILITER(S): at 14:18

## 2024-01-09 RX ADMIN — Medication 200 MILLIGRAM(S): at 17:05

## 2024-01-09 RX ADMIN — Medication 125 MILLIGRAM(S): at 14:17

## 2024-01-09 RX ADMIN — Medication 150 GRAM(S): at 14:32

## 2024-01-09 RX ADMIN — Medication 100 MILLIGRAM(S): at 22:13

## 2024-01-09 RX ADMIN — BUDESONIDE AND FORMOTEROL FUMARATE DIHYDRATE 2 PUFF(S): 160; 4.5 AEROSOL RESPIRATORY (INHALATION) at 19:44

## 2024-01-09 RX ADMIN — Medication 3 MILLILITER(S): at 14:19

## 2024-01-09 RX ADMIN — CEFTRIAXONE 100 MILLIGRAM(S): 500 INJECTION, POWDER, FOR SOLUTION INTRAMUSCULAR; INTRAVENOUS at 15:05

## 2024-01-09 RX ADMIN — Medication 3 MILLIGRAM(S): at 22:13

## 2024-01-09 RX ADMIN — ENOXAPARIN SODIUM 40 MILLIGRAM(S): 100 INJECTION SUBCUTANEOUS at 17:05

## 2024-01-09 NOTE — H&P ADULT - NSHPSOCIALHISTORY_GEN_ALL_CORE
Tobacco: denies  EtOH: denies  Recreational drugs: denies  Lives with: alone  Ambulation: independent  ADLs: Dependent

## 2024-01-09 NOTE — H&P ADULT - HISTORY OF PRESENT ILLNESS
67 yo M PMH COPD (on 3L home O2, hx multiple exacerbations treated at [facility] this year), presenting with respiratory distress, sob, cough x3-4 days, occasionally productive of clear sputum. Denies sick contacts, recent travel, recent illness. Denies fevers/chills, HA, cp, palpitations, n/v/d/c, dysuria, dyschezia, hematuria, hematochezia. Endorses this current episode feels very similar to prior COPD exacerbations.     ED Course:  Vitals: T not measured, , BP not measured, RR 40, SpO2 86% ORA --> T 99.2 F, , /74, RR 20, SpO2 97% on BIPAP 12/5, FiO2 40%  Labs: WBC 17.42, N-Lebron 83%, pO2 arterial 179  EKG: excessive noise, unable to make personal read, reordered  Given: Azithromycin 500 mg IV x1, Rocephin 1g IV x1, Magnesium 2g IV x1, Duoneb x3, Solu-Medrol x1  CXR: Clear lungs with no acute cardiopulmonary abnormalities. 67 yo M PMH COPD (on 3L home O2, hx multiple exacerbations treated at [facility] this year), presenting with respiratory distress, sob, cough x3-4 days, occasionally productive of clear sputum. Denies sick contacts, recent travel, recent illness. Denies fevers/chills, HA, cp, palpitations, n/v/d/c, dysuria, dyschezia, hematuria, hematochezia. Endorses this current episode feels very similar to prior COPD exacerbations.     ED Course:  Vitals: T not measured, , BP not measured, RR 40, SpO2 86% ORA --> T 99.2 F, , /74, RR 20, SpO2 97% on BIPAP 12/5, FiO2 40%  Labs: WBC 17.42, N-Lebron 83%, pO2 arterial 179  EKG: excessive noise, unable to make personal read, reordered  Given: Azithromycin 500 mg IV x1, Rocephin 1g IV x1, Magnesium 2g IV x1, Duoneb x3, Solu-Medrol x1  CXR: Clear lungs with no acute cardiopulmonary abnormalities.

## 2024-01-09 NOTE — H&P ADULT - PROBLEM SELECTOR PLAN 2
- Pt on 3L home O2, reports 9 exacerbations (treated at [facility]) in past year  - Plan as above; c/w Solumedrol, Duoneb, Azithromycin, Rocephin - Pt on 3L home O2, reports 9 exacerbations (treated at [facility]) in past year  - Plan as above; c/w Solumedrol, Duoneb, Azithromycin, Rocephin

## 2024-01-09 NOTE — CONSULT NOTE ADULT - SUBJECTIVE AND OBJECTIVE BOX
Date/Time Patient Seen:  		  Referring MD:   Data Reviewed	       Patient is a 68y old  Male who presents with a chief complaint of acute hypoxic respiratory failure (09 Jan 2024 15:59)      Subjective/HPI   69 yo M PMH COPD (on 3L home O2, hx multiple exacerbations treated at [facility] this year), presenting with respiratory distress, sob, cough x3-4 days, occasionally productive of clear sputum.  PAST MEDICAL & SURGICAL HISTORY:  COPD, severe    PAST MEDICAL HISTORY:  COPD, severe.     Social History:  · Substance use	No  · Social History (marital status, living situation, occupation, and sexual history)	Tobacco: denies  EtOH: denies  Recreational drugs: denies  Lives with: alone  Ambulation: independent  ADLs: Dependent     Tobacco Screening:  · Core Measure Site	Yes  · Has the patient used tobacco in the past 30 days?	No    Risk Assessment:    Present on Admission:  Deep Venous Thrombosis	no  Pulmonary Embolus	no     HIV Screening:  · In accordance with [place] State law, we offer every patient who comes to our ED an HIV test. Would you like to be tested today?	Opt out        Medication list         MEDICATIONS  (STANDING):  albuterol/ipratropium for Nebulization 3 milliLiter(s) Nebulizer every 6 hours  azithromycin  IVPB 500 milliGRAM(s) IV Intermittent every 24 hours  benzonatate 100 milliGRAM(s) Oral three times a day  budesonide 160 MICROgram(s)/formoterol 4.5 MICROgram(s) Inhaler 2 Puff(s) Inhalation two times a day  cefTRIAXone   IVPB 1000 milliGRAM(s) IV Intermittent every 24 hours  enoxaparin Injectable 40 milliGRAM(s) SubCutaneous every 24 hours  guaiFENesin Oral Liquid (Sugar-Free) 200 milliGRAM(s) Oral every 6 hours  methylPREDNISolone sodium succinate Injectable 40 milliGRAM(s) IV Push two times a day    MEDICATIONS  (PRN):  acetaminophen     Tablet .. 650 milliGRAM(s) Oral every 6 hours PRN Temp greater or equal to 38C (100.4F), Mild Pain (1 - 3)  aluminum hydroxide/magnesium hydroxide/simethicone Suspension 30 milliLiter(s) Oral every 4 hours PRN Dyspepsia  melatonin 3 milliGRAM(s) Oral at bedtime PRN Insomnia         Vitals log        ICU Vital Signs Last 24 Hrs  T(C): 37.3 (09 Jan 2024 15:20), Max: 37.3 (09 Jan 2024 15:20)  T(F): 99.2 (09 Jan 2024 15:20), Max: 99.2 (09 Jan 2024 15:20)  HR: 98 (09 Jan 2024 16:47) (98 - 140)  BP: 112/74 (09 Jan 2024 15:20) (112/74 - 112/74)  BP(mean): --  ABP: --  ABP(mean): --  RR: 20 (09 Jan 2024 15:20) (20 - 40)  SpO2: 96% (09 Jan 2024 16:47) (86% - 98%)    O2 Parameters below as of 09 Jan 2024 15:20  Patient On (Oxygen Delivery Method): BiPAP/CPAP                 Input and Output:  I&O's Detail      Lab Data                        13.2   17.42 )-----------( 193      ( 09 Jan 2024 13:25 )             39.8     01-09    138  |  107  |  20  ----------------------------<  134<H>  4.1   |  25  |  1.00    Ca    8.7      09 Jan 2024 13:25  Mg     2.0     01-09    TPro  7.3  /  Alb  3.2<L>  /  TBili  0.6  /  DBili  x   /  AST  27  /  ALT  38  /  AlkPhos  92  01-09    ABG - ( 09 Jan 2024 13:54 )  pH, Arterial: 7.40  pH, Blood: x     /  pCO2: 42    /  pO2: 179   / HCO3: 26    / Base Excess: 1.2   /  SaO2: 99.9              CARDIAC MARKERS ( 09 Jan 2024 13:25 )  x     / x     / 126 U/L / x     / x            Review of Systems	  cough  weakness  resp distress      Objective     Physical Examination    heart s1s2  lung dc BS  hea dnc      Pertinent Lab findings & Imaging      Flores:  NO   Adequate UO     I&O's Detail           Discussed with:     Cultures:	        Radiology          ACC: [id number] EXAM:  XR CHEST PORTABLE IMMED 1V   ORDERED BY: MALCOLM LAWRENCE     PROCEDURE DATE:  01/09/2024          INTERPRETATION:  An AP chest radiograph was performed for shortness of   breath.    Comparison is made to 2/27/2023.    The lungs are clear bilaterally with no infiltrates on either side. There   is no pneumothorax. There are no pleural effusions. There is no hilar or   mediastinal widening. The cardiac silhouette is not enlarged. There is no   CHF. The bony thorax is grossly intact. No significant changes seen   compared to the previous study.    IMPRESSION: Clear lungs with no acute cardiopulmonary abnormalities.    --- End of Report ---            CHUCKY MITCHELL MD; Attending Radiologist  This document has been electronically signed. Jan 9 2024  2:33PM                     Date/Time Patient Seen:  		  Referring MD:   Data Reviewed	       Patient is a 68y old  Male who presents with a chief complaint of acute hypoxic respiratory failure (09 Jan 2024 15:59)      Subjective/HPI   67 yo M PMH COPD (on 3L home O2, hx multiple exacerbations treated at [facility] this year), presenting with respiratory distress, sob, cough x3-4 days, occasionally productive of clear sputum.  PAST MEDICAL & SURGICAL HISTORY:  COPD, severe    PAST MEDICAL HISTORY:  COPD, severe.     Social History:  · Substance use	No  · Social History (marital status, living situation, occupation, and sexual history)	Tobacco: denies  EtOH: denies  Recreational drugs: denies  Lives with: alone  Ambulation: independent  ADLs: Dependent     Tobacco Screening:  · Core Measure Site	Yes  · Has the patient used tobacco in the past 30 days?	No    Risk Assessment:    Present on Admission:  Deep Venous Thrombosis	no  Pulmonary Embolus	no     HIV Screening:  · In accordance with [place] law, we offer every patient who comes to our ED an HIV test. Would you like to be tested today?	Opt out        Medication list         MEDICATIONS  (STANDING):  albuterol/ipratropium for Nebulization 3 milliLiter(s) Nebulizer every 6 hours  azithromycin  IVPB 500 milliGRAM(s) IV Intermittent every 24 hours  benzonatate 100 milliGRAM(s) Oral three times a day  budesonide 160 MICROgram(s)/formoterol 4.5 MICROgram(s) Inhaler 2 Puff(s) Inhalation two times a day  cefTRIAXone   IVPB 1000 milliGRAM(s) IV Intermittent every 24 hours  enoxaparin Injectable 40 milliGRAM(s) SubCutaneous every 24 hours  guaiFENesin Oral Liquid (Sugar-Free) 200 milliGRAM(s) Oral every 6 hours  methylPREDNISolone sodium succinate Injectable 40 milliGRAM(s) IV Push two times a day    MEDICATIONS  (PRN):  acetaminophen     Tablet .. 650 milliGRAM(s) Oral every 6 hours PRN Temp greater or equal to 38C (100.4F), Mild Pain (1 - 3)  aluminum hydroxide/magnesium hydroxide/simethicone Suspension 30 milliLiter(s) Oral every 4 hours PRN Dyspepsia  melatonin 3 milliGRAM(s) Oral at bedtime PRN Insomnia         Vitals log        ICU Vital Signs Last 24 Hrs  T(C): 37.3 (09 Jan 2024 15:20), Max: 37.3 (09 Jan 2024 15:20)  T(F): 99.2 (09 Jan 2024 15:20), Max: 99.2 (09 Jan 2024 15:20)  HR: 98 (09 Jan 2024 16:47) (98 - 140)  BP: 112/74 (09 Jan 2024 15:20) (112/74 - 112/74)  BP(mean): --  ABP: --  ABP(mean): --  RR: 20 (09 Jan 2024 15:20) (20 - 40)  SpO2: 96% (09 Jan 2024 16:47) (86% - 98%)    O2 Parameters below as of 09 Jan 2024 15:20  Patient On (Oxygen Delivery Method): BiPAP/CPAP                 Input and Output:  I&O's Detail      Lab Data                        13.2   17.42 )-----------( 193      ( 09 Jan 2024 13:25 )             39.8     01-09    138  |  107  |  20  ----------------------------<  134<H>  4.1   |  25  |  1.00    Ca    8.7      09 Jan 2024 13:25  Mg     2.0     01-09    TPro  7.3  /  Alb  3.2<L>  /  TBili  0.6  /  DBili  x   /  AST  27  /  ALT  38  /  AlkPhos  92  01-09    ABG - ( 09 Jan 2024 13:54 )  pH, Arterial: 7.40  pH, Blood: x     /  pCO2: 42    /  pO2: 179   / HCO3: 26    / Base Excess: 1.2   /  SaO2: 99.9              CARDIAC MARKERS ( 09 Jan 2024 13:25 )  x     / x     / 126 U/L / x     / x            Review of Systems	  cough  weakness  resp distress      Objective     Physical Examination    heart s1s2  lung dc BS  hea dnc      Pertinent Lab findings & Imaging      Flores:  NO   Adequate UO     I&O's Detail           Discussed with:     Cultures:	        Radiology          ACC: [id number] EXAM:  XR CHEST PORTABLE IMMED 1V   ORDERED BY: MALCOLM LAWRENCE     PROCEDURE DATE:  01/09/2024          INTERPRETATION:  An AP chest radiograph was performed for shortness of   breath.    Comparison is made to 2/27/2023.    The lungs are clear bilaterally with no infiltrates on either side. There   is no pneumothorax. There are no pleural effusions. There is no hilar or   mediastinal widening. The cardiac silhouette is not enlarged. There is no   CHF. The bony thorax is grossly intact. No significant changes seen   compared to the previous study.    IMPRESSION: Clear lungs with no acute cardiopulmonary abnormalities.    --- End of Report ---            CHUCKY MITCHELL MD; Attending Radiologist  This document has been electronically signed. Jan 9 2024  2:33PM

## 2024-01-09 NOTE — ED ADULT NURSE REASSESSMENT NOTE - NS ED NURSE REASSESS COMMENT FT1
attempting to put pt backk on bipap at this time, pt refusing bipap, stating "my O2 levels are okay and I don't need it." no acute respiratory distress noted. on 5L o2 nc at this time satting 95%. MD Rajan made aware of situation and pt able to stay off bipap.

## 2024-01-09 NOTE — ED ADULT NURSE NOTE - NSFALLHARMRISKINTERV_ED_ALL_ED
Communicate risk of Fall with Harm to all staff, patient, and family/Provide visual cue: red socks, yellow wristband, yellow gown, etc/Reinforce activity limits and safety measures with patient and family/Bed in lowest position, wheels locked, appropriate side rails in place/Call bell, personal items and telephone in reach/Instruct patient to call for assistance before getting out of bed/chair/stretcher/Non-slip footwear applied when patient is off stretcher/[place] to call system/Physically safe environment - no spills, clutter or unnecessary equipment/Purposeful Proactive Rounding/Room/bathroom lighting operational, light cord in reach Communicate risk of Fall with Harm to all staff, patient, and family/Provide visual cue: red socks, yellow wristband, yellow gown, etc/Reinforce activity limits and safety measures with patient and family/Bed in lowest position, wheels locked, appropriate side rails in place/Call bell, personal items and telephone in reach/Instruct patient to call for assistance before getting out of bed/chair/stretcher/Non-slip footwear applied when patient is off stretcher/[place] to call system/Physically safe environment - no spills, clutter or unnecessary equipment/Purposeful Proactive Rounding/Room/bathroom lighting operational, light cord in reach

## 2024-01-09 NOTE — H&P ADULT - NSHPREVIEWOFSYSTEMS_GEN_ALL_CORE
CONSTITUTIONAL: No weakness, fevers or chills  HEENT:  No headache, no visual changes, no sore throat  RESPIRATORY: +cough, +sputum, +sob  CARDIOVASCULAR: No chest pain or palpitations  GASTROINTESTINAL: No abdominal pain, no nausea, vomiting, or hematemesis; No diarrhea or constipation. No melena or hematochezia.  GENITOURINARY: No dysuria, frequency or hematuria  NEUROLOGICAL: No focal weakness or dizziness   MSK: No myalgias  SKIN: No itching, burning, rashes, or lesions

## 2024-01-09 NOTE — ED ADULT NURSE NOTE - OBJECTIVE STATEMENT
Pt is A&Ox4, having difficulty breathing with a hx COPD. Denies chest pain, palpitations, no n/v/d. Pt has increased work of breathing. Pt is A&Ox4, having difficulty breathing with a hx COPD. Denies chest pain, palpitations, no n/v/d. Pt has increased work of breathing, sounds wheezy, appears to be anxious. Blood work sent and meds given as per MD orders. Iv lock placed, dry and intact, flushes without difficulty. Pt placed on bipap, and cardiac monitor. Resting in bed, bed rails up, wheels locked.

## 2024-01-09 NOTE — ED ADULT NURSE REASSESSMENT NOTE - NS ED NURSE REASSESS COMMENT FT1
pt received from day shift. pt resting comfortably in bed. pt in no acute distress, breathing spontaneous. pt on continuous oxygen via nasal cannula. pt on continuous monitoring.

## 2024-01-09 NOTE — ED PROVIDER NOTE - PROGRESS NOTE DETAILS
resp status improved on bipap, more comfortable.  abx added for leukocytosis  endorsed to Dr. Rajan for higher level of care

## 2024-01-09 NOTE — CONSULT NOTE ADULT - ASSESSMENT
67 yo M PMH COPD (on 3L home O2, hx multiple exacerbations treated at [facility] this year), presenting with respiratory distress, sob, cough x3-4 days, occasionally productive of clear sputum.    copd  copd ex  resp distress    niv use  wean as tolerated  ABG noted  cxr NAPD  bronchodilators - ics - steroids -   HOB elev  asp prec  monitor VS and HD and Sat  monitored unit admission   goal sat > 88 pct  consideration for CT imaging if there is no improvement and ICU consult     67 yo M PMH COPD (on 3L home O2, hx multiple exacerbations treated at [facility] this year), presenting with respiratory distress, sob, cough x3-4 days, occasionally productive of clear sputum.    copd  copd ex  resp distress    niv use  wean as tolerated  ABG noted  cxr NAPD  bronchodilators - ics - steroids -   HOB elev  asp prec  monitor VS and HD and Sat  monitored unit admission   goal sat > 88 pct  consideration for CT imaging if there is no improvement and ICU consult

## 2024-01-09 NOTE — H&P ADULT - ASSESSMENT
69 yo M PMH COPD (on 3L home O2, hx multiple exacerbations treated at [facility] this year), presenting with respiratory distress, sob, cough x3-4 days, occasionally productive of clear sputum. Requiring BIPAP, admitted for acute hypoxic respiratory failure likely 2/2 COPD exacerbation. 67 yo M PMH COPD (on 3L home O2, hx multiple exacerbations treated at [facility] this year), presenting with respiratory distress, sob, cough x3-4 days, occasionally productive of clear sputum. Requiring BIPAP, admitted for acute hypoxic respiratory failure likely 2/2 COPD exacerbation.

## 2024-01-09 NOTE — H&P ADULT - ATTENDING COMMENTS
67 yo M PMH COPD (on 3L home O2, hx multiple exacerbations treated at [facility] this year), presenting with respiratory distress, sob, cough x3-4 days, occasionally productive of clear sputum. Requiring BIPAP, admitted for acute hypoxic respiratory failure likely 2/2 COPD exacerbation.    Cont pt on bipap as needed, duonebs ATC, solumedrol taper over next couple of days, robitussin, tessalon perles, and symbicort started, also on ceftriaxone and azithromycin for suspected PNA, ID and Pulm consulted, will f/u on recs. Trend fever curve, and f/u sputum, legionella, strep cultures.    Dimas Rajan, Attending Physician 69 yo M PMH COPD (on 3L home O2, hx multiple exacerbations treated at [facility] this year), presenting with respiratory distress, sob, cough x3-4 days, occasionally productive of clear sputum. Requiring BIPAP, admitted for acute hypoxic respiratory failure likely 2/2 COPD exacerbation.    Cont pt on bipap as needed, duonebs ATC, solumedrol taper over next couple of days, robitussin, tessalon perles, and symbicort started, also on ceftriaxone and azithromycin for suspected PNA, ID and Pulm consulted, will f/u on recs. Trend fever curve, and f/u sputum, legionella, strep cultures.    Dimas Rajan, Attending Physician

## 2024-01-09 NOTE — ED PROVIDER NOTE - CLINICAL SUMMARY MEDICAL DECISION MAKING FREE TEXT BOX
69 yo M PMH copd (no h/o intubations, +h/o biap) p/w resp distress, sob, cough x 3-4 days, progressive.   vs- hypoxia 80%, tachypnea  B/L wheezing, resp distress, speaking short sentences    bipap, labs, cxr, duonebs, solumed, mag, reassess

## 2024-01-09 NOTE — ED PROVIDER NOTE - CARE PLAN
Principal Discharge DX:	Acute respiratory failure with hypoxia   1 Principal Discharge DX:	Acute respiratory failure with hypoxia  Secondary Diagnosis:	COPD exacerbation

## 2024-01-09 NOTE — PATIENT PROFILE ADULT - FALL HARM RISK - HARM RISK INTERVENTIONS
Communicate Risk of Fall with Harm to all staff/Reinforce activity limits and safety measures with patient and family/Tailored Fall Risk Interventions/Use of alarms - bed, chair and/or voice tab/Visual Cue: Yellow wristband and red socks/Bed in lowest position, wheels locked, appropriate side rails in place/Call bell, personal items and telephone in reach/Instruct patient to call for assistance before getting out of bed or chair/Non-slip footwear when patient is out of bed/[place] to call system/Physically safe environment - no spills, clutter or unnecessary equipment/Purposeful Proactive Rounding/Room/bathroom lighting operational, light cord in reach Communicate Risk of Fall with Harm to all staff/Reinforce activity limits and safety measures with patient and family/Tailored Fall Risk Interventions/Use of alarms - bed, chair and/or voice tab/Visual Cue: Yellow wristband and red socks/Bed in lowest position, wheels locked, appropriate side rails in place/Call bell, personal items and telephone in reach/Instruct patient to call for assistance before getting out of bed or chair/Non-slip footwear when patient is out of bed/[place] to call system/Physically safe environment - no spills, clutter or unnecessary equipment/Purposeful Proactive Rounding/Room/bathroom lighting operational, light cord in reach

## 2024-01-09 NOTE — ED PROVIDER NOTE - OBJECTIVE STATEMENT
67 yo M PMH copd (h/o bipap need, no h/o intubations) p/w resp distress sob and cough progressive x 3-4 days. denies cp. states he usually goes to [facility] which has his records. 67 yo M PMH copd (h/o bipap need, no h/o intubations) p/w resp distress sob and cough progressive x 3-4 days. denies cp. states he usually goes to [facility] which has his records. 69 yo M PMH copd (h/o bipap need, no h/o intubations) p/w resp distress sob and cough progressive x 3-4 days. denies cp. states he usually goes to [facility] which has his records.  PMD Patel Crabtree MD 67 yo M PMH copd (h/o bipap need, no h/o intubations) p/w resp distress sob and cough progressive x 3-4 days. denies cp. states he usually goes to [facility] which has his records.  PMD Patel Crabtree MD

## 2024-01-09 NOTE — H&P ADULT - PROBLEM SELECTOR PLAN 1
- P/w sob, cough, increased work of breathing, desat to 86 on RA, tachypnea to 40  - Afebrile, though leukocytotic to 17.4; CXR clear  - BIPAP 12/5, FiO2 40%, now satting well  - S/p Solumedrol x1, Duoneb x3, Azithromycin, Rocephin IV in ED  - C/w Azithromycin, Rocephin empirically  - C/w Duoneb, Solumedrol, BIPAP  - F/u BCx  - Pulm Dr. Martinez consulted, will appreciate recs - P/w sob, cough, increased work of breathing, desat to 86 on RA, tachypnea to 40  - Afebrile, though leukocytotic to 17.4; CXR clear  - BIPAP 12/5, FiO2 40%, now satting well  - S/p Solumedrol x1, Duoneb x3, Azithromycin, Rocephin IV in ED  - C/w Azithromycin, Rocephin concern for concomitant PNA with severe leukocytosis on presentation, and appears ot be severe COPD; f/u sputum, legionella, strep  - C/w Duoneb, Solumedrol, BIPAP  - F/u BCx  - Pulm Dr. Martinez consulted, will appreciate recs

## 2024-01-09 NOTE — H&P ADULT - NSHPPHYSICALEXAM_GEN_ALL_CORE
T(C): 37.3 (01-09-24 @ 15:20), Max: 37.3 (01-09-24 @ 15:20)  HR: 101 (01-09-24 @ 15:20) (101 - 140)  BP: 112/74 (01-09-24 @ 15:20) (112/74 - 112/74)  RR: 20 (01-09-24 @ 15:20) (20 - 40)  SpO2: 97% (01-09-24 @ 15:20) (86% - 98%)  Wt(kg): --    Physical Exam:   GENERAL: well-groomed, well-developed, NAD  HEENT: head NC/AT; EOM intact, PERRL, conjunctiva & sclera clear;  NECK: supple, no JVD  RESPIRATORY: Decreased lung sounds B/L. Difficult to auscultate d/t BIPAP in place.  CARDIOVASCULAR: S1&S2, RRR, no murmurs or gallops  ABDOMEN: soft, non-tender, non-distended, + Bowel sounds x4 quadrants, no guarding, rebound or rigidity  EXTREMITY:  Mild clubbing of fingernails. No cyanosis, edema x4 extremities.  VASCULAR: Radial and DP pulses 2+ bilaterally, no varicose veins   SKIN: warm and dry, color normal  NEUROLOGIC: AA&O X3, no ptosis, no dysarthria, no facial droop, motor Strength 5/5 in UE & LE B/L  Psych: Normal mood and affect, normal behavior

## 2024-01-09 NOTE — ED PROVIDER NOTE - PHYSICAL EXAMINATION
Constitutional: Awake, Alert. +acute distress ill appearing, Cooperative.   HEAD: NC/AT; no signs of trauma   EYES: Conjunctiva and sclera are clear bilaterally. EOMI.   ENT: MMM. No rhinorrhea,  NECK: FROM. Supple.   CARDIOVASCULAR: RRR, Normal S1, S2. No audible murmurs. No chest wall ttp. Radial pulses +2 B/L.  RESPIRATORY: Speaking short sentences. +respiratory distress, tachypneic, breath sounds +wheezing B/L, increased WOB  +accessory muscle use.   ABDOMEN: Soft; NTND.   EXTREMITIES: Full active ROM in all extremities; no deformities; non-tender to palpation; no edema, no crepitus or step off;  distal pulses palpable and symmetric.  SKIN: Warm, dry; good skin turgor, no apparent lesions or rashes, no ecchymosis, brisk capillary refill.  NEURO: awake and alert, follows commands. No facial droop. Moving all extremities spontaneously. Constitutional: Awake, Alert. +acute distress ill appearing, Cooperative.   HEAD: NC/AT; no signs of trauma   EYES: Conjunctiva and sclera are clear bilaterally. EOMI.   ENT: MMM. No rhinorrhea,  NECK: FROM. Supple.   CARDIOVASCULAR: tachycardic, Normal S1, S2. No audible murmurs. No chest wall ttp. Radial pulses +2 B/L.  RESPIRATORY: Speaking short sentences. +respiratory distress, tachypneic, breath sounds +wheezing B/L, increased WOB  +accessory muscle use.   ABDOMEN: Soft; NTND.   EXTREMITIES: Full active ROM in all extremities; no deformities; non-tender to palpation; no edema, no crepitus or step off;  distal pulses palpable and symmetric.  SKIN: Warm, dry; good skin turgor, no apparent lesions or rashes, no ecchymosis, brisk capillary refill.  NEURO: awake and alert, follows commands. No facial droop. Moving all extremities spontaneously.

## 2024-01-10 LAB
A1C WITH ESTIMATED AVERAGE GLUCOSE RESULT: 6.3 % — HIGH (ref 4–5.6)
A1C WITH ESTIMATED AVERAGE GLUCOSE RESULT: 6.3 % — HIGH (ref 4–5.6)
ALBUMIN SERPL ELPH-MCNC: 3.2 G/DL — LOW (ref 3.3–5)
ALBUMIN SERPL ELPH-MCNC: 3.2 G/DL — LOW (ref 3.3–5)
ALP SERPL-CCNC: 87 U/L — SIGNIFICANT CHANGE UP (ref 40–120)
ALP SERPL-CCNC: 87 U/L — SIGNIFICANT CHANGE UP (ref 40–120)
ALT FLD-CCNC: 37 U/L — SIGNIFICANT CHANGE UP (ref 12–78)
ALT FLD-CCNC: 37 U/L — SIGNIFICANT CHANGE UP (ref 12–78)
ANION GAP SERPL CALC-SCNC: 6 MMOL/L — SIGNIFICANT CHANGE UP (ref 5–17)
ANION GAP SERPL CALC-SCNC: 6 MMOL/L — SIGNIFICANT CHANGE UP (ref 5–17)
AST SERPL-CCNC: 21 U/L — SIGNIFICANT CHANGE UP (ref 15–37)
AST SERPL-CCNC: 21 U/L — SIGNIFICANT CHANGE UP (ref 15–37)
BASOPHILS # BLD AUTO: 0.02 K/UL — SIGNIFICANT CHANGE UP (ref 0–0.2)
BASOPHILS # BLD AUTO: 0.02 K/UL — SIGNIFICANT CHANGE UP (ref 0–0.2)
BASOPHILS NFR BLD AUTO: 0.1 % — SIGNIFICANT CHANGE UP (ref 0–2)
BASOPHILS NFR BLD AUTO: 0.1 % — SIGNIFICANT CHANGE UP (ref 0–2)
BILIRUB SERPL-MCNC: 0.4 MG/DL — SIGNIFICANT CHANGE UP (ref 0.2–1.2)
BILIRUB SERPL-MCNC: 0.4 MG/DL — SIGNIFICANT CHANGE UP (ref 0.2–1.2)
BUN SERPL-MCNC: 23 MG/DL — SIGNIFICANT CHANGE UP (ref 7–23)
BUN SERPL-MCNC: 23 MG/DL — SIGNIFICANT CHANGE UP (ref 7–23)
CALCIUM SERPL-MCNC: 8.6 MG/DL — SIGNIFICANT CHANGE UP (ref 8.5–10.1)
CALCIUM SERPL-MCNC: 8.6 MG/DL — SIGNIFICANT CHANGE UP (ref 8.5–10.1)
CHLORIDE SERPL-SCNC: 107 MMOL/L — SIGNIFICANT CHANGE UP (ref 96–108)
CHLORIDE SERPL-SCNC: 107 MMOL/L — SIGNIFICANT CHANGE UP (ref 96–108)
CHOLEST SERPL-MCNC: 181 MG/DL — SIGNIFICANT CHANGE UP
CHOLEST SERPL-MCNC: 181 MG/DL — SIGNIFICANT CHANGE UP
CO2 SERPL-SCNC: 25 MMOL/L — SIGNIFICANT CHANGE UP (ref 22–31)
CO2 SERPL-SCNC: 25 MMOL/L — SIGNIFICANT CHANGE UP (ref 22–31)
CREAT SERPL-MCNC: 0.85 MG/DL — SIGNIFICANT CHANGE UP (ref 0.5–1.3)
CREAT SERPL-MCNC: 0.85 MG/DL — SIGNIFICANT CHANGE UP (ref 0.5–1.3)
EGFR: 93 ML/MIN/1.73M2 — SIGNIFICANT CHANGE UP
EGFR: 93 ML/MIN/1.73M2 — SIGNIFICANT CHANGE UP
EOSINOPHIL # BLD AUTO: 0 K/UL — SIGNIFICANT CHANGE UP (ref 0–0.5)
EOSINOPHIL # BLD AUTO: 0 K/UL — SIGNIFICANT CHANGE UP (ref 0–0.5)
EOSINOPHIL NFR BLD AUTO: 0 % — SIGNIFICANT CHANGE UP (ref 0–6)
EOSINOPHIL NFR BLD AUTO: 0 % — SIGNIFICANT CHANGE UP (ref 0–6)
ESTIMATED AVERAGE GLUCOSE: 134 MG/DL — HIGH (ref 68–114)
ESTIMATED AVERAGE GLUCOSE: 134 MG/DL — HIGH (ref 68–114)
GLUCOSE SERPL-MCNC: 131 MG/DL — HIGH (ref 70–99)
GLUCOSE SERPL-MCNC: 131 MG/DL — HIGH (ref 70–99)
HCT VFR BLD CALC: 37.5 % — LOW (ref 39–50)
HCT VFR BLD CALC: 37.5 % — LOW (ref 39–50)
HCV AB S/CO SERPL IA: 0.06 S/CO — SIGNIFICANT CHANGE UP (ref 0–0.99)
HCV AB S/CO SERPL IA: 0.06 S/CO — SIGNIFICANT CHANGE UP (ref 0–0.99)
HCV AB SERPL-IMP: SIGNIFICANT CHANGE UP
HCV AB SERPL-IMP: SIGNIFICANT CHANGE UP
HDLC SERPL-MCNC: 37 MG/DL — LOW
HDLC SERPL-MCNC: 37 MG/DL — LOW
HGB BLD-MCNC: 12.5 G/DL — LOW (ref 13–17)
HGB BLD-MCNC: 12.5 G/DL — LOW (ref 13–17)
IMM GRANULOCYTES NFR BLD AUTO: 0.5 % — SIGNIFICANT CHANGE UP (ref 0–0.9)
IMM GRANULOCYTES NFR BLD AUTO: 0.5 % — SIGNIFICANT CHANGE UP (ref 0–0.9)
LEGIONELLA AG UR QL: NEGATIVE — SIGNIFICANT CHANGE UP
LEGIONELLA AG UR QL: NEGATIVE — SIGNIFICANT CHANGE UP
LIPID PNL WITH DIRECT LDL SERPL: 132 MG/DL — HIGH
LIPID PNL WITH DIRECT LDL SERPL: 132 MG/DL — HIGH
LYMPHOCYTES # BLD AUTO: 1.39 K/UL — SIGNIFICANT CHANGE UP (ref 1–3.3)
LYMPHOCYTES # BLD AUTO: 1.39 K/UL — SIGNIFICANT CHANGE UP (ref 1–3.3)
LYMPHOCYTES # BLD AUTO: 8.8 % — LOW (ref 13–44)
LYMPHOCYTES # BLD AUTO: 8.8 % — LOW (ref 13–44)
MCHC RBC-ENTMCNC: 28.9 PG — SIGNIFICANT CHANGE UP (ref 27–34)
MCHC RBC-ENTMCNC: 28.9 PG — SIGNIFICANT CHANGE UP (ref 27–34)
MCHC RBC-ENTMCNC: 33.3 GM/DL — SIGNIFICANT CHANGE UP (ref 32–36)
MCHC RBC-ENTMCNC: 33.3 GM/DL — SIGNIFICANT CHANGE UP (ref 32–36)
MCV RBC AUTO: 86.8 FL — SIGNIFICANT CHANGE UP (ref 80–100)
MCV RBC AUTO: 86.8 FL — SIGNIFICANT CHANGE UP (ref 80–100)
MONOCYTES # BLD AUTO: 0.6 K/UL — SIGNIFICANT CHANGE UP (ref 0–0.9)
MONOCYTES # BLD AUTO: 0.6 K/UL — SIGNIFICANT CHANGE UP (ref 0–0.9)
MONOCYTES NFR BLD AUTO: 3.8 % — SIGNIFICANT CHANGE UP (ref 2–14)
MONOCYTES NFR BLD AUTO: 3.8 % — SIGNIFICANT CHANGE UP (ref 2–14)
NEUTROPHILS # BLD AUTO: 13.65 K/UL — HIGH (ref 1.8–7.4)
NEUTROPHILS # BLD AUTO: 13.65 K/UL — HIGH (ref 1.8–7.4)
NEUTROPHILS NFR BLD AUTO: 86.8 % — HIGH (ref 43–77)
NEUTROPHILS NFR BLD AUTO: 86.8 % — HIGH (ref 43–77)
NON HDL CHOLESTEROL: 144 MG/DL — HIGH
NON HDL CHOLESTEROL: 144 MG/DL — HIGH
NRBC # BLD: 0 /100 WBCS — SIGNIFICANT CHANGE UP (ref 0–0)
NRBC # BLD: 0 /100 WBCS — SIGNIFICANT CHANGE UP (ref 0–0)
PLATELET # BLD AUTO: 208 K/UL — SIGNIFICANT CHANGE UP (ref 150–400)
PLATELET # BLD AUTO: 208 K/UL — SIGNIFICANT CHANGE UP (ref 150–400)
POTASSIUM SERPL-MCNC: 4.4 MMOL/L — SIGNIFICANT CHANGE UP (ref 3.5–5.3)
POTASSIUM SERPL-MCNC: 4.4 MMOL/L — SIGNIFICANT CHANGE UP (ref 3.5–5.3)
POTASSIUM SERPL-SCNC: 4.4 MMOL/L — SIGNIFICANT CHANGE UP (ref 3.5–5.3)
POTASSIUM SERPL-SCNC: 4.4 MMOL/L — SIGNIFICANT CHANGE UP (ref 3.5–5.3)
PROT SERPL-MCNC: 7.4 G/DL — SIGNIFICANT CHANGE UP (ref 6–8.3)
PROT SERPL-MCNC: 7.4 G/DL — SIGNIFICANT CHANGE UP (ref 6–8.3)
RBC # BLD: 4.32 M/UL — SIGNIFICANT CHANGE UP (ref 4.2–5.8)
RBC # BLD: 4.32 M/UL — SIGNIFICANT CHANGE UP (ref 4.2–5.8)
RBC # FLD: 15.2 % — HIGH (ref 10.3–14.5)
RBC # FLD: 15.2 % — HIGH (ref 10.3–14.5)
S PNEUM AG UR QL: NEGATIVE — SIGNIFICANT CHANGE UP
S PNEUM AG UR QL: NEGATIVE — SIGNIFICANT CHANGE UP
SODIUM SERPL-SCNC: 138 MMOL/L — SIGNIFICANT CHANGE UP (ref 135–145)
SODIUM SERPL-SCNC: 138 MMOL/L — SIGNIFICANT CHANGE UP (ref 135–145)
TRIGL SERPL-MCNC: 62 MG/DL — SIGNIFICANT CHANGE UP
TRIGL SERPL-MCNC: 62 MG/DL — SIGNIFICANT CHANGE UP
WBC # BLD: 15.74 K/UL — HIGH (ref 3.8–10.5)
WBC # BLD: 15.74 K/UL — HIGH (ref 3.8–10.5)
WBC # FLD AUTO: 15.74 K/UL — HIGH (ref 3.8–10.5)
WBC # FLD AUTO: 15.74 K/UL — HIGH (ref 3.8–10.5)

## 2024-01-10 PROCEDURE — 99233 SBSQ HOSP IP/OBS HIGH 50: CPT | Mod: GC

## 2024-01-10 RX ADMIN — Medication 100 MILLIGRAM(S): at 23:06

## 2024-01-10 RX ADMIN — BUDESONIDE AND FORMOTEROL FUMARATE DIHYDRATE 2 PUFF(S): 160; 4.5 AEROSOL RESPIRATORY (INHALATION) at 05:57

## 2024-01-10 RX ADMIN — ENOXAPARIN SODIUM 40 MILLIGRAM(S): 100 INJECTION SUBCUTANEOUS at 17:12

## 2024-01-10 RX ADMIN — Medication 3 MILLILITER(S): at 08:20

## 2024-01-10 RX ADMIN — Medication 100 MILLIGRAM(S): at 05:57

## 2024-01-10 RX ADMIN — Medication 3 MILLILITER(S): at 12:38

## 2024-01-10 RX ADMIN — Medication 100 MILLIGRAM(S): at 13:17

## 2024-01-10 RX ADMIN — BUDESONIDE AND FORMOTEROL FUMARATE DIHYDRATE 2 PUFF(S): 160; 4.5 AEROSOL RESPIRATORY (INHALATION) at 23:06

## 2024-01-10 RX ADMIN — Medication 200 MILLIGRAM(S): at 11:24

## 2024-01-10 RX ADMIN — Medication 200 MILLIGRAM(S): at 17:13

## 2024-01-10 RX ADMIN — Medication 200 MILLIGRAM(S): at 23:06

## 2024-01-10 RX ADMIN — Medication 40 MILLIGRAM(S): at 17:13

## 2024-01-10 RX ADMIN — Medication 200 MILLIGRAM(S): at 05:57

## 2024-01-10 RX ADMIN — Medication 3 MILLILITER(S): at 21:27

## 2024-01-10 NOTE — PHYSICAL THERAPY INITIAL EVALUATION ADULT - ADDITIONAL COMMENTS
Pt has 3 stairs to get into the house; everything is on the same floor. Able to navigate independently.

## 2024-01-10 NOTE — PROGRESS NOTE ADULT - ASSESSMENT
69 yo M PMH COPD (on 3L home O2, hx multiple exacerbations treated at [facility] this year), presenting with respiratory distress, sob, cough x3-4 days, occasionally productive of clear sputum.    copd  copd ex  resp distress  URI    s/p NIV  nc o2 support  nebs - steroids - inhaler  tele monitored    s/p niv use  wean as tolerated  ABG noted  cxr NAPD  bronchodilators - ics - steroids -   HOB elev  asp prec  monitor VS and HD and Sat  monitored unit admission   goal sat > 88 pct  consideration for CT imaging if there is no improvement and ICU consult  URI - sx management 69 yo M PMH COPD (on 3L home O2, hx multiple exacerbations treated at [facility] this year), presenting with respiratory distress, sob, cough x3-4 days, occasionally productive of clear sputum.    copd  copd ex  resp distress  URI    s/p NIV  nc o2 support  nebs - steroids - inhaler  tele monitored    s/p niv use  wean as tolerated  ABG noted  cxr NAPD  bronchodilators - ics - steroids -   HOB elev  asp prec  monitor VS and HD and Sat  monitored unit admission   goal sat > 88 pct  consideration for CT imaging if there is no improvement and ICU consult  URI - sx management

## 2024-01-10 NOTE — PHYSICAL THERAPY INITIAL EVALUATION ADULT - CRITERIA FOR SKILLED THERAPEUTIC INTERVENTIONS
PT ACCEPTED TO [facility] [place] UNDER [address] POLANCO ROOM [address]-A CALL [phone] FOR 
REPORT PER THEO. Dyspnea/impairments found

## 2024-01-10 NOTE — PROGRESS NOTE ADULT - ASSESSMENT
67 yo M PMH COPD (on 3L home O2, hx multiple exacerbations treated at [facility] this year), presenting with respiratory distress, sob, cough x3-4 days, occasionally productive of clear sputum. Requiring BIPAP, admitted for acute hypoxic respiratory failure likely 2/2 COPD exacerbation. 67 yo M PMH COPD (on 3L home O2, hx multiple exacerbations treated at [facility] this year), presenting with respiratory distress, sob, cough x3-4 days, occasionally productive of clear sputum. Requiring BIPAP, admitted for acute hypoxic respiratory failure likely 2/2 COPD exacerbation. 69 yo M PMH COPD (on 3L home O2, hx multiple exacerbations treated at [facility] this year), presenting with respiratory distress, sob, cough x3-4 days, occasionally productive of clear sputum. Requiring BIPAP, admitted for acute hypoxic respiratory failure likely 2/2 COPD exacerbation secondary to viral syndrome. 69 yo M PMH COPD (on 3L home O2, hx multiple exacerbations treated at [facility] this year), presenting with respiratory distress, sob, cough x3-4 days, occasionally productive of clear sputum. Requiring BIPAP, admitted for acute hypoxic respiratory failure likely 2/2 COPD exacerbation secondary to viral syndrome.

## 2024-01-10 NOTE — PROGRESS NOTE ADULT - SUBJECTIVE AND OBJECTIVE BOX
Patient is a 68y old  Male who presents with a chief complaint of acute hypoxic respiratory failure (10 Ministerio 2024 05:49)      Subjective:  INTERVAL HPI/OVERNIGHT EVENTS: Patient seen and examined at bedside. No overnight events occurred. Patient has no complaints at this time. Denies fevers, chills, headache, lightheadedness, chest pain, dyspnea, abdominal pain, n/v/d/c.    MEDICATIONS  (STANDING):  albuterol/ipratropium for Nebulization 3 milliLiter(s) Nebulizer every 6 hours  benzonatate 100 milliGRAM(s) Oral three times a day  budesonide 160 MICROgram(s)/formoterol 4.5 MICROgram(s) Inhaler 2 Puff(s) Inhalation two times a day  enoxaparin Injectable 40 milliGRAM(s) SubCutaneous every 24 hours  guaiFENesin Oral Liquid (Sugar-Free) 200 milliGRAM(s) Oral every 6 hours  influenza  Vaccine (HIGH DOSE) 0.7 milliLiter(s) IntraMuscular once  methylPREDNISolone sodium succinate Injectable 40 milliGRAM(s) IV Push two times a day    MEDICATIONS  (PRN):  acetaminophen     Tablet .. 650 milliGRAM(s) Oral every 6 hours PRN Temp greater or equal to 38C (100.4F), Mild Pain (1 - 3)  aluminum hydroxide/magnesium hydroxide/simethicone Suspension 30 milliLiter(s) Oral every 4 hours PRN Dyspepsia  melatonin 3 milliGRAM(s) Oral at bedtime PRN Insomnia      Allergies    No Known Allergies    Intolerances        REVIEW OF SYSTEMS:  CONSTITUTIONAL: No fever or chills  HEENT:  No headache, no sore throat  RESPIRATORY: No cough, wheezing, or shortness of breath  CARDIOVASCULAR: No chest pain, palpitations  GASTROINTESTINAL: No abd pain, nausea, vomiting, or diarrhea  GENITOURINARY: No dysuria, frequency, or hematuria  NEUROLOGICAL: no focal weakness or dizziness  MUSCULOSKELETAL: no myalgias     Objective:  Vital Signs Last 24 Hrs  T(C): 36.7 (10 Ministerio 2024 05:54), Max: 37.3 (09 Jan 2024 15:20)  T(F): 98 (10 Ministerio 2024 05:54), Max: 99.2 (09 Jan 2024 15:20)  HR: 76 (10 Ministerio 2024 08:25) (76 - 140)  BP: 150/66 (10 Ministerio 2024 05:54) (95/56 - 150/66)  BP(mean): --  RR: 22 (10 Ministerio 2024 05:54) (18 - 40)  SpO2: 94% (10 Ministerio 2024 08:25) (86% - 98%)    Parameters below as of 10 Ministerio 2024 08:25  Patient On (Oxygen Delivery Method): nasal cannula        GENERAL: NAD, lying in bed comfortably  HEAD:  Atraumatic, Normocephalic  EYES: EOMI, PERRLA, conjunctiva and sclera clear  ENT: Moist mucous membranes  NECK: Supple, No JVD  CHEST/LUNG: Clear to auscultation bilaterally; No rales, rhonchi, wheezing, or rubs. Unlabored respirations  HEART: Regular rate and rhythm; No murmurs, rubs, or gallops  ABDOMEN: Bowel sounds present; Soft, Nontender, Nondistended. No hepatomegaly  EXTREMITIES:  2+ Peripheral Pulses, brisk capillary refill. No clubbing, cyanosis, or edema  NERVOUS SYSTEM:  Alert & Oriented X3, speech clear. No deficits   MSK: FROM all 4 extremities, full and equal strength  SKIN: No rashes or lesions    LABS:                        12.5   15.74 )-----------( 208      ( 10 Ministerio 2024 08:55 )             37.5     CBC Full  -  ( 10 Ministerio 2024 08:55 )  WBC Count : 15.74 K/uL  Hemoglobin : 12.5 g/dL  Hematocrit : 37.5 %  Platelet Count - Automated : 208 K/uL  Mean Cell Volume : 86.8 fl  Mean Cell Hemoglobin : 28.9 pg  Mean Cell Hemoglobin Concentration : 33.3 gm/dL  Auto Neutrophil # : 13.65 K/uL  Auto Lymphocyte # : 1.39 K/uL  Auto Monocyte # : 0.60 K/uL  Auto Eosinophil # : 0.00 K/uL  Auto Basophil # : 0.02 K/uL  Auto Neutrophil % : 86.8 %  Auto Lymphocyte % : 8.8 %  Auto Monocyte % : 3.8 %  Auto Eosinophil % : 0.0 %  Auto Basophil % : 0.1 %    10 Ministerio 2024 08:55    138    |  107    |  23     ----------------------------<  131    4.4     |  25     |  0.85     Ca    8.6        10 Ministerio 2024 08:55  Mg     2.0       09 Jan 2024 13:25    TPro  7.4    /  Alb  3.2    /  TBili  0.4    /  DBili  x      /  AST  21     /  ALT  37     /  AlkPhos  87     10 Ministerio 2024 08:55    PT/INR - ( 09 Jan 2024 13:25 )   PT: 12.3 sec;   INR: 1.05 ratio         PTT - ( 09 Jan 2024 13:25 )  PTT:34.7 sec  Urinalysis Basic - ( 10 Ministerio 2024 08:55 )    Color: x / Appearance: x / SG: x / pH: x  Gluc: 131 mg/dL / Ketone: x  / Bili: x / Urobili: x   Blood: x / Protein: x / Nitrite: x   Leuk Esterase: x / RBC: x / WBC x   Sq Epi: x / Non Sq Epi: x / Bacteria: x      CAPILLARY BLOOD GLUCOSE              RADIOLOGY & ADDITIONAL TESTS:    Personally reviewed.     Consultant(s) Notes Reviewed:  [x] YES  [ ] NO     Patient is a 68y old  Male who presents with a chief complaint of acute hypoxic respiratory failure (10 Ministerio 2024 05:49)      Subjective:  INTERVAL HPI/OVERNIGHT EVENTS: Patient seen and examined at bedside, COVID precautions taken. No overnight events occurred. Patient complaints of SOB and cough at this time. Denies fevers, headache, n/v/d. 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 Pt endorses he was on prednisone 2 weeks ago for 5 days for OA of his knee.     MEDICATIONS  (STANDING):  albuterol/ipratropium for Nebulization 3 milliLiter(s) Nebulizer every 6 hours  benzonatate 100 milliGRAM(s) Oral three times a day  budesonide 160 MICROgram(s)/formoterol 4.5 MICROgram(s) Inhaler 2 Puff(s) Inhalation two times a day  enoxaparin Injectable 40 milliGRAM(s) SubCutaneous every 24 hours  guaiFENesin Oral Liquid (Sugar-Free) 200 milliGRAM(s) Oral every 6 hours  influenza  Vaccine (HIGH DOSE) 0.7 milliLiter(s) IntraMuscular once  methylPREDNISolone sodium succinate Injectable 40 milliGRAM(s) IV Push two times a day    MEDICATIONS  (PRN):  acetaminophen     Tablet .. 650 milliGRAM(s) Oral every 6 hours PRN Temp greater or equal to 38C (100.4F), Mild Pain (1 - 3)  aluminum hydroxide/magnesium hydroxide/simethicone Suspension 30 milliLiter(s) Oral every 4 hours PRN Dyspepsia  melatonin 3 milliGRAM(s) Oral at bedtime PRN Insomnia      Allergies    No Known Allergies    Intolerances        REVIEW OF SYSTEMS:  CONSTITUTIONAL: No fever  HEENT:  No headache  RESPIRATORY: +cough, +shortness of breath, no wheezing  CARDIOVASCULAR: No chest pain  GASTROINTESTINAL: No abd pain, nausea, vomiting, or diarrhea  GENITOURINARY: No dysuria     Objective:  Vital Signs Last 24 Hrs  T(C): 36.7 (10 Ministerio 2024 05:54), Max: 37.3 (09 Jan 2024 15:20)  T(F): 98 (10 Ministerio 2024 05:54), Max: 99.2 (09 Jan 2024 15:20)  HR: 76 (10 Ministerio 2024 08:25) (76 - 140)  BP: 150/66 (10 Ministerio 2024 05:54) (95/56 - 150/66)  BP(mean): --  RR: 22 (10 Ministerio 2024 05:54) (18 - 40)  SpO2: 94% (10 Ministerio 2024 08:25) (86% - 98%)    Parameters below as of 10 Ministerio 2024 08:25  Patient On (Oxygen Delivery Method): nasal cannula        GENERAL: NAD  HEAD:  Atraumatic, Normocephalic  EYES: conjunctiva and sclera clear  CHEST/LUNG: +Decreased breaths sounds B/L. No rales, rhonchi, wheezing, or rubs. Unlabored respirations  HEART: Regular rate and rhythm; No murmurs, rubs, or gallops  ABDOMEN: Bowel sounds present; Soft, Nontender, Nondistended.   EXTREMITIES:  2+ Peripheral Pulses. No clubbing, cyanosis, or edema  NERVOUS SYSTEM:  Alert & Oriented X3, speech clear    LABS:                        12.5   15.74 )-----------( 208      ( 10 Ministerio 2024 08:55 )             37.5     CBC Full  -  ( 10 Ministerio 2024 08:55 )  WBC Count : 15.74 K/uL  Hemoglobin : 12.5 g/dL  Hematocrit : 37.5 %  Platelet Count - Automated : 208 K/uL  Mean Cell Volume : 86.8 fl  Mean Cell Hemoglobin : 28.9 pg  Mean Cell Hemoglobin Concentration : 33.3 gm/dL  Auto Neutrophil # : 13.65 K/uL  Auto Lymphocyte # : 1.39 K/uL  Auto Monocyte # : 0.60 K/uL  Auto Eosinophil # : 0.00 K/uL  Auto Basophil # : 0.02 K/uL  Auto Neutrophil % : 86.8 %  Auto Lymphocyte % : 8.8 %  Auto Monocyte % : 3.8 %  Auto Eosinophil % : 0.0 %  Auto Basophil % : 0.1 %    10 Ministerio 2024 08:55    138    |  107    |  23     ----------------------------<  131    4.4     |  25     |  0.85     Ca    8.6        10 Ministerio 2024 08:55  Mg     2.0       09 Jan 2024 13:25    TPro  7.4    /  Alb  3.2    /  TBili  0.4    /  DBili  x      /  AST  21     /  ALT  37     /  AlkPhos  87     10 Ministerio 2024 08:55    PT/INR - ( 09 Jan 2024 13:25 )   PT: 12.3 sec;   INR: 1.05 ratio         PTT - ( 09 Jan 2024 13:25 )  PTT:34.7 sec  Urinalysis Basic - ( 10 Ministerio 2024 08:55 )    Color: x / Appearance: x / SG: x / pH: x  Gluc: 131 mg/dL / Ketone: x  / Bili: x / Urobili: x   Blood: x / Protein: x / Nitrite: x   Leuk Esterase: x / RBC: x / WBC x   Sq Epi: x / Non Sq Epi: x / Bacteria: x      CAPILLARY BLOOD GLUCOSE              RADIOLOGY & ADDITIONAL TESTS:  < from: Xray Chest 1 View-PORTABLE IMMEDIATE (Xray Chest 1 View-PORTABLE IMMEDIATE .) (01.09.24 @ 13:46) >  PROCEDURE DATE:  01/09/2024          INTERPRETATION:  An AP chest radiograph was performed for shortness of   breath.    Comparison is made to 2/27/2023.    The lungs are clear bilaterally with no infiltrates on either side. There   is no pneumothorax. There are no pleural effusions. There is no hilar or   mediastinal widening. The cardiac silhouette is not enlarged. There is no   CHF. The bony thorax isgrossly intact. No significant changes seen   compared to the previous study.    IMPRESSION: Clear lungs with no acute cardiopulmonary abnormalities.    --- End of Report ---            CHUCKY MITCHELL MD; Attending Radiologist  This document has been electronically signed. Jan 9 2024  2:33PM    < end of copied text >    Personally reviewed.     Consultant(s) Notes Reviewed:  [x] YES  [ ] NO     Patient is a 68y old  Male who presents with a chief complaint of acute hypoxic respiratory failure (10 Ministerio 2024 05:49)      Subjective:  INTERVAL HPI/OVERNIGHT EVENTS: Patient seen and examined at bedside.. No overnight events occurred. Patient complaints of SOB(improving) and cough at this time. Denies fevers, headache, n/v/d. 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 Pt endorses he was on prednisone 2 weeks ago for 5 days for OA of his knee.     MEDICATIONS  (STANDING):  albuterol/ipratropium for Nebulization 3 milliLiter(s) Nebulizer every 6 hours  benzonatate 100 milliGRAM(s) Oral three times a day  budesonide 160 MICROgram(s)/formoterol 4.5 MICROgram(s) Inhaler 2 Puff(s) Inhalation two times a day  enoxaparin Injectable 40 milliGRAM(s) SubCutaneous every 24 hours  guaiFENesin Oral Liquid (Sugar-Free) 200 milliGRAM(s) Oral every 6 hours  influenza  Vaccine (HIGH DOSE) 0.7 milliLiter(s) IntraMuscular once  methylPREDNISolone sodium succinate Injectable 40 milliGRAM(s) IV Push two times a day    MEDICATIONS  (PRN):  acetaminophen     Tablet .. 650 milliGRAM(s) Oral every 6 hours PRN Temp greater or equal to 38C (100.4F), Mild Pain (1 - 3)  aluminum hydroxide/magnesium hydroxide/simethicone Suspension 30 milliLiter(s) Oral every 4 hours PRN Dyspepsia  melatonin 3 milliGRAM(s) Oral at bedtime PRN Insomnia      Allergies    No Known Allergies    Intolerances        REVIEW OF SYSTEMS:  CONSTITUTIONAL: No fever  HEENT:  No headache  RESPIRATORY: +cough, +shortness of breath, no wheezing  CARDIOVASCULAR: No chest pain  GASTROINTESTINAL: No abd pain, nausea, vomiting, or diarrhea  GENITOURINARY: No dysuria     Objective:  Vital Signs Last 24 Hrs  T(C): 36.7 (10 Ministerio 2024 05:54), Max: 37.3 (09 Jan 2024 15:20)  T(F): 98 (10 Ministerio 2024 05:54), Max: 99.2 (09 Jan 2024 15:20)  HR: 76 (10 Ministerio 2024 08:25) (76 - 140)  BP: 150/66 (10 Ministerio 2024 05:54) (95/56 - 150/66)  BP(mean): --  RR: 22 (10 Ministerio 2024 05:54) (18 - 40)  SpO2: 94% (10 Ministerio 2024 08:25) (86% - 98%)    Parameters below as of 10 Ministerio 2024 08:25  Patient On (Oxygen Delivery Method): nasal cannula      PHYSICAL EXAM:  GENERAL: NAD  CHEST/LUNG: +Decreased breaths sounds B/L, some increase wob by the end of conversation  HEART: Regular rate and rhythm; +s1/s2  ABDOMEN: Bowel sounds present; Soft, Nontender, Nondistended.   EXTREMITIES:  2+ Peripheral Pulses. No clubbing, cyanosis, or edema  NERVOUS SYSTEM:  Alert & Oriented X3, speech clear    LABS:                        12.5   15.74 )-----------( 208      ( 10 Ministerio 2024 08:55 )             37.5     CBC Full  -  ( 10 Ministerio 2024 08:55 )  WBC Count : 15.74 K/uL  Hemoglobin : 12.5 g/dL  Hematocrit : 37.5 %  Platelet Count - Automated : 208 K/uL  Mean Cell Volume : 86.8 fl  Mean Cell Hemoglobin : 28.9 pg  Mean Cell Hemoglobin Concentration : 33.3 gm/dL  Auto Neutrophil # : 13.65 K/uL  Auto Lymphocyte # : 1.39 K/uL  Auto Monocyte # : 0.60 K/uL  Auto Eosinophil # : 0.00 K/uL  Auto Basophil # : 0.02 K/uL  Auto Neutrophil % : 86.8 %  Auto Lymphocyte % : 8.8 %  Auto Monocyte % : 3.8 %  Auto Eosinophil % : 0.0 %  Auto Basophil % : 0.1 %    10 Ministerio 2024 08:55    138    |  107    |  23     ----------------------------<  131    4.4     |  25     |  0.85     Ca    8.6        10 Ministerio 2024 08:55  Mg     2.0       09 Jan 2024 13:25    TPro  7.4    /  Alb  3.2    /  TBili  0.4    /  DBili  x      /  AST  21     /  ALT  37     /  AlkPhos  87     10 Ministerio 2024 08:55    PT/INR - ( 09 Jan 2024 13:25 )   PT: 12.3 sec;   INR: 1.05 ratio         PTT - ( 09 Jan 2024 13:25 )  PTT:34.7 sec  Urinalysis Basic - ( 10 Ministerio 2024 08:55 )    Color: x / Appearance: x / SG: x / pH: x  Gluc: 131 mg/dL / Ketone: x  / Bili: x / Urobili: x   Blood: x / Protein: x / Nitrite: x   Leuk Esterase: x / RBC: x / WBC x   Sq Epi: x / Non Sq Epi: x / Bacteria: x      CAPILLARY BLOOD GLUCOSE              RADIOLOGY & ADDITIONAL TESTS:  < from: Xray Chest 1 View-PORTABLE IMMEDIATE (Xray Chest 1 View-PORTABLE IMMEDIATE .) (01.09.24 @ 13:46) >  PROCEDURE DATE:  01/09/2024          INTERPRETATION:  An AP chest radiograph was performed for shortness of   breath.    Comparison is made to 2/27/2023.    The lungs are clear bilaterally with no infiltrates on either side. There   is no pneumothorax. There are no pleural effusions. There is no hilar or   mediastinal widening. The cardiac silhouette is not enlarged. There is no   CHF. The bony thorax isgrossly intact. No significant changes seen   compared to the previous study.    IMPRESSION: Clear lungs with no acute cardiopulmonary abnormalities.    --- End of Report ---            CHUCKY MITCHELL MD; Attending Radiologist  This document has been electronically signed. Jan 9 2024  2:33PM    < end of copied text >    Personally reviewed.     Consultant(s) Notes Reviewed:  [x] YES  [ ] NO     Patient is a 68y old  Male who presents with a chief complaint of acute hypoxic respiratory failure (10 Ministerio 2024 05:49)      Subjective:  INTERVAL HPI/OVERNIGHT EVENTS: Patient seen and examined at bedside.. No overnight events occurred. Patient complaints of SOB(improving) and cough at this time. Denies fevers, headache, n/v/d. 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 Pt endorses he was on prednisone 2 weeks ago for 6 days for OA of his knee. Normally takes 3L O2 via NC. Currently on 5L and saturating well. Overall improving.    MEDICATIONS  (STANDING):  albuterol/ipratropium for Nebulization 3 milliLiter(s) Nebulizer every 6 hours  benzonatate 100 milliGRAM(s) Oral three times a day  budesonide 160 MICROgram(s)/formoterol 4.5 MICROgram(s) Inhaler 2 Puff(s) Inhalation two times a day  enoxaparin Injectable 40 milliGRAM(s) SubCutaneous every 24 hours  guaiFENesin Oral Liquid (Sugar-Free) 200 milliGRAM(s) Oral every 6 hours  influenza  Vaccine (HIGH DOSE) 0.7 milliLiter(s) IntraMuscular once  methylPREDNISolone sodium succinate Injectable 40 milliGRAM(s) IV Push two times a day    MEDICATIONS  (PRN):  acetaminophen     Tablet .. 650 milliGRAM(s) Oral every 6 hours PRN Temp greater or equal to 38C (100.4F), Mild Pain (1 - 3)  aluminum hydroxide/magnesium hydroxide/simethicone Suspension 30 milliLiter(s) Oral every 4 hours PRN Dyspepsia  melatonin 3 milliGRAM(s) Oral at bedtime PRN Insomnia      Allergies    No Known Allergies    Intolerances        REVIEW OF SYSTEMS:  CONSTITUTIONAL: No fever  HEENT:  No headache  RESPIRATORY: +cough, +shortness of breath, no wheezing  CARDIOVASCULAR: No chest pain  GASTROINTESTINAL: No abd pain, nausea, vomiting, or diarrhea  GENITOURINARY: No dysuria     Objective:  Vital Signs Last 24 Hrs  T(C): 36.7 (10 Ministerio 2024 05:54), Max: 37.3 (09 Jan 2024 15:20)  T(F): 98 (10 Ministerio 2024 05:54), Max: 99.2 (09 Jan 2024 15:20)  HR: 76 (10 Ministerio 2024 08:25) (76 - 140)  BP: 150/66 (10 Ministerio 2024 05:54) (95/56 - 150/66)-  RR: 22 (10 Ministerio 2024 05:54) (18 - 40)  SpO2: 94% (10 Ministerio 2024 08:25) (86% - 98%)    Parameters below as of 10 Ministerio 2024 08:25  Patient On (Oxygen Delivery Method): nasal cannula      PHYSICAL EXAM:  GENERAL: NAD  CHEST/LUNG: Decreased breath sounds, mild wheeze in RLL  HEART: Regular rate and rhythm; +s1/s2  ABDOMEN: Bowel sounds present; Soft, Nontender, Nondistended.   EXTREMITIES:  2+ Peripheral Pulses. No clubbing, cyanosis, or edema  NERVOUS SYSTEM:  Alert & Oriented X3, speech clear  PSYCH: restless, rocking leg    LABS:                        12.5   15.74 )-----------( 208      ( 10 Ministerio 2024 08:55 )             37.5     CBC Full  -  ( 10 Ministerio 2024 08:55 )  WBC Count : 15.74 K/uL  Hemoglobin : 12.5 g/dL  Hematocrit : 37.5 %  Platelet Count - Automated : 208 K/uL  Mean Cell Volume : 86.8 fl  Mean Cell Hemoglobin : 28.9 pg  Mean Cell Hemoglobin Concentration : 33.3 gm/dL  Auto Neutrophil # : 13.65 K/uL  Auto Lymphocyte # : 1.39 K/uL  Auto Monocyte # : 0.60 K/uL  Auto Eosinophil # : 0.00 K/uL  Auto Basophil # : 0.02 K/uL  Auto Neutrophil % : 86.8 %  Auto Lymphocyte % : 8.8 %  Auto Monocyte % : 3.8 %  Auto Eosinophil % : 0.0 %  Auto Basophil % : 0.1 %    10 Ministerio 2024 08:55    138    |  107    |  23     ----------------------------<  131    4.4     |  25     |  0.85     Ca    8.6        10 Ministerio 2024 08:55  Mg     2.0       09 Jan 2024 13:25    TPro  7.4    /  Alb  3.2    /  TBili  0.4    /  DBili  x      /  AST  21     /  ALT  37     /  AlkPhos  87     10 Ministerio 2024 08:55    PT/INR - ( 09 Jan 2024 13:25 )   PT: 12.3 sec;   INR: 1.05 ratio         PTT - ( 09 Jan 2024 13:25 )  PTT:34.7 sec  Urinalysis Basic - ( 10 Ministerio 2024 08:55 )    Color: x / Appearance: x / SG: x / pH: x  Gluc: 131 mg/dL / Ketone: x  / Bili: x / Urobili: x   Blood: x / Protein: x / Nitrite: x   Leuk Esterase: x / RBC: x / WBC x   Sq Epi: x / Non Sq Epi: x / Bacteria: x      CAPILLARY BLOOD GLUCOSE              RADIOLOGY & ADDITIONAL TESTS:  < from: Xray Chest 1 View-PORTABLE IMMEDIATE (Xray Chest 1 View-PORTABLE IMMEDIATE .) (01.09.24 @ 13:46) >  PROCEDURE DATE:  01/09/2024          INTERPRETATION:  An AP chest radiograph was performed for shortness of   breath.    Comparison is made to 2/27/2023.    The lungs are clear bilaterally with no infiltrates on either side. There   is no pneumothorax. There are no pleural effusions. There is no hilar or   mediastinal widening. The cardiac silhouette is not enlarged. There is no   CHF. The bony thorax isgrossly intact. No significant changes seen   compared to the previous study.    IMPRESSION: Clear lungs with no acute cardiopulmonary abnormalities.    --- End of Report ---            CHUCKY MITCHELL MD; Attending Radiologist  This document has been electronically signed. Jan 9 2024  2:33PM    < end of copied text >    Personally reviewed.     Consultant(s) Notes Reviewed:  [x] YES  [ ] NO

## 2024-01-10 NOTE — PHYSICAL THERAPY INITIAL EVALUATION ADULT - WEIGHT-BEARING RESTRICTIONS: STAND/SIT, REHAB EVAL
full weight-bearing
56 yo F presents to ED A+Ox3 c/o head injury. Patient states she fell down steps this afternoon, states she hit her head but denies LOC. Denies taking any anticoagulation medications. Patient reports that she was able to ambulate following the fall and feels steady while walking. Reports pain to the back of her head. Denies vision changes, N/V, dizziness. Patient is well appearing, sitting up in stretcher in no acute distress. Breathing spontaneous and unlabored on RA. Skin warm pink and dry. Speech clear. Moves all extremities. Bed in lowest position, side rails  up.

## 2024-01-10 NOTE — PATIENT CHOICE NOTE. - NSPTCHOICESTATE_GEN_ALL_CORE
I have met with the patient and/or caregiver to discuss discharge goals and treatment plan. Patient and/or caregiver also provided with instructions on accessing the CMS Compare websites for additional information related to Post Acute Provider quality and resource use measures to assist them in evaluation of the providers and in selecting their post-acute provider of choice. Patient and caregiver were informed of the facilities that are owned and/or operated by [facility]. I have discussed with the patient the availability of in-network facilities and providers. Patient and caregiver provided with a list of post-acute providers whose services are appropriate to the discharge plans and patient needs.     For patient requiring durable medical equipment, patient and/or caregiver were informed that they have the right to request who provides the required equipment. I have met with the patient and/or caregiver to discuss discharge goals and treatment plan. Patient and/or caregiver also provided with instructions on accessing the CMS Compare websites for additional information related to Post Acute Provider quality and resource use measures to assist them in evaluation of the providers and in selecting their post-acute provider of choice. Patient and caregiver were informed of the facilities that are owned and/or operated by [facility]. I have discussed with the patient the availability of in-network facilities and providers. Patient and caregiver provided with a list of post-acute providers whose services are appropriate to the discharge plans and patient needs.     For patient requiring durable medical equipment, patient and/or caregiver were informed that they have the right to request who provides the required equipment.

## 2024-01-10 NOTE — PROGRESS NOTE ADULT - PROBLEM SELECTOR PLAN 1
- P/w sob, cough, increased work of breathing, desat to 86 on RA, tachypnea to 40  - Afebrile, though leukocytotic to 17.4; CXR clear  - BIPAP 12/5, FiO2 40%, now satting well  - S/p Solumedrol x1, Duoneb x3, Azithromycin, Rocephin IV in ED  - C/w Azithromycin, Rocephin concern for concomitant PNA with severe leukocytosis on presentation, and appears ot be severe COPD; f/u sputum, legionella, strep  - C/w Duoneb, Solumedrol, BIPAP  - F/u BCx  - Pulm Dr. Martinez consulted, will appreciate recs - P/w sob, cough, increased work of breathing, desat to 86 on RA, tachypnea to 40 on admission  - Elevated leukocytosis possibly 2/2 to prednisone use  - CXR clear  - D/c BIPAP   - Start 5L NC, satting well at 95%  - S/p Solumedrol x1, Duoneb x3, Azithromycin, Rocephin IV in ED  - D/c Azithromycin, Rocephin   - F/u sputum, legionella, BCx  - Strep neg  - RVP positive for Coronavirus, neg for SARS-COV-2  - C/w Duoneb  - D/c Solumedrol  - Pulm Dr. Martinez consulted, will appreciate recs - P/w sob, cough, increased work of breathing, desat to 86 on RA, tachypnea to 40 on admission  - Elevated leukocytosis possibly 2/2 to prednisone use  - CXR negative   - s/p BIPAP   - Now on 5L NC, satting well at 93-95%  - S/p Solumedrol x1, Duoneb x3, Azithromycin, Rocephin IV in ED  - D/c Azithromycin, Rocephin -- low suspicion for bacterial etiology at this time  - F/u sputum, legionella, BCx  - Strep neg  - RVP positive for Coronavirus, neg for SARS-COV-2  - C/w Duoneb, cough sx regimen  - Pulm Dr. Martinez consulted, will appreciate recs - P/w sob, cough, increased work of breathing, desat to 86 on RA, tachypnea to 40 on admission  - Elevated leukocytosis possibly 2/2 to prednisone use  - CXR negative  - s/p BIPAP   - Now on 5L NC, satting well at 93-95%  - S/p Solumedrol x1, Duoneb x3, Azithromycin, Rocephin IV in ED  - D/c Azithromycin, Rocephin -- low suspicion for bacterial etiology at this time  - F/u sputum, legionella, BCx  - Strep neg  - RVP positive for Coronavirus, neg for SARS-COV-2  - C/w Duoneb, cough sx regimen  - Pulm Dr. Martinez consulted, will appreciate recs

## 2024-01-10 NOTE — PROGRESS NOTE ADULT - ATTENDING SUPERVISION STATEMENT
Patient calling to inform PCP that she will be cancelling her holiter monitor.  She will wait a few more weeks.     Resident

## 2024-01-10 NOTE — PROGRESS NOTE ADULT - SUBJECTIVE AND OBJECTIVE BOX
Date/Time Patient Seen:  		  Referring MD:   Data Reviewed	       Patient is a 68y old  Male who presents with a chief complaint of acute hypoxic respiratory failure (09 Jan 2024 17:01)      Subjective/HPI     PAST MEDICAL & SURGICAL HISTORY:  COPD, severe          Medication list         MEDICATIONS  (STANDING):  albuterol/ipratropium for Nebulization 3 milliLiter(s) Nebulizer every 6 hours  azithromycin  IVPB 500 milliGRAM(s) IV Intermittent every 24 hours  benzonatate 100 milliGRAM(s) Oral three times a day  budesonide 160 MICROgram(s)/formoterol 4.5 MICROgram(s) Inhaler 2 Puff(s) Inhalation two times a day  cefTRIAXone   IVPB 1000 milliGRAM(s) IV Intermittent every 24 hours  enoxaparin Injectable 40 milliGRAM(s) SubCutaneous every 24 hours  guaiFENesin Oral Liquid (Sugar-Free) 200 milliGRAM(s) Oral every 6 hours  influenza  Vaccine (HIGH DOSE) 0.7 milliLiter(s) IntraMuscular once  methylPREDNISolone sodium succinate Injectable 40 milliGRAM(s) IV Push two times a day    MEDICATIONS  (PRN):  acetaminophen     Tablet .. 650 milliGRAM(s) Oral every 6 hours PRN Temp greater or equal to 38C (100.4F), Mild Pain (1 - 3)  aluminum hydroxide/magnesium hydroxide/simethicone Suspension 30 milliLiter(s) Oral every 4 hours PRN Dyspepsia  melatonin 3 milliGRAM(s) Oral at bedtime PRN Insomnia         Vitals log        ICU Vital Signs Last 24 Hrs  T(C): 36.7 (09 Jan 2024 22:23), Max: 37.3 (09 Jan 2024 15:20)  T(F): 98 (09 Jan 2024 22:23), Max: 99.2 (09 Jan 2024 15:20)  HR: 91 (09 Jan 2024 22:23) (90 - 140)  BP: 118/72 (09 Jan 2024 22:23) (95/56 - 118/72)  BP(mean): --  ABP: --  ABP(mean): --  RR: 21 (09 Jan 2024 22:23) (18 - 40)  SpO2: 91% (09 Jan 2024 22:23) (86% - 98%)    O2 Parameters below as of 09 Jan 2024 22:23  Patient On (Oxygen Delivery Method): nasal cannula  O2 Flow (L/min): 5               Input and Output:  I&O's Detail      Lab Data                        13.2   17.42 )-----------( 193      ( 09 Jan 2024 13:25 )             39.8     01-09    138  |  107  |  20  ----------------------------<  134<H>  4.1   |  25  |  1.00    Ca    8.7      09 Jan 2024 13:25  Mg     2.0     01-09    TPro  7.3  /  Alb  3.2<L>  /  TBili  0.6  /  DBili  x   /  AST  27  /  ALT  38  /  AlkPhos  92  01-09    ABG - ( 09 Jan 2024 13:54 )  pH, Arterial: 7.40  pH, Blood: x     /  pCO2: 42    /  pO2: 179   / HCO3: 26    / Base Excess: 1.2   /  SaO2: 99.9              CARDIAC MARKERS ( 09 Jan 2024 13:25 )  x     / x     / 126 U/L / x     / x            Review of Systems	      Objective     Physical Examination    heart s1s2  lung dc bS  head nc      Pertinent Lab findings & Imaging      Mark:  NO   Adequate UO     I&O's Detail           Discussed with:     Cultures:	        Radiology

## 2024-01-10 NOTE — PROGRESS NOTE ADULT - PROBLEM SELECTOR PLAN 2
- Pt on 3L home O2, reports 9 exacerbations (treated at [facility]) in past year  - Plan as above; c/w Solumedrol, Duoneb, Azithromycin, Rocephin - Pt on 3L home O2, reports 9 exacerbations (treated at [facility]) in past year  - Plan as above; c/w Solumedrol, Duoneb, Azithromycin, Rocephin - Pt on 3L home O2, reports 9 exacerbations (treated at [facility]) in past year  - Start NC 5L, satting well. continue to monitor - Pt on 3L home O2, reports 9 exacerbations (treated at [facility]) in past year  - Start NC 5L, satting well. continue to monitor - Pt on 3L home O2, reports 9 exacerbations (treated at [facility]) in past year  - currently on 5L NC, satting well. continue to monitor - Pt on 3L home O2, reports 9 exacerbations (treated at [facility]) in past year  - currently on 5L NC, satting well. continue to monitor - Pt on 3L home O2, reports 9 exacerbations (treated at [facility]) in past year  - Likely secondary to viral URI - Coronavirus  - Clinically improving - no need for CT imaging for now  - currently on 5L NC, satting well. continue to monitor  - Continue nebs and solu-medrol - taper as per pulmonary  - Stop antibiotics  - Can d/c telemetry monitoring - Pt on 3L home O2, reports 9 exacerbations (treated at [facility]) in past year  - Likely secondary to viral URI - Coronavirus  - Clinically improving - no need for CT imaging for now  - currently on 5L NC, satting well. continue to monitor  - Continue nebs and solu-medrol - taper as per pulmonary  - Stop antibiotics  - Can d/c telemetry monitoring

## 2024-01-10 NOTE — PHYSICAL THERAPY INITIAL EVALUATION ADULT - PERTINENT HX OF CURRENT PROBLEM, REHAB EVAL
69 yo M PMH copd (h/o bipap need, no h/o intubations) p/w resp distress sob and cough progressive x 3-4 days. denies cp. states he usually goes to [facility] which has his records. 67 yo M PMH copd (h/o bipap need, no h/o intubations) p/w resp distress sob and cough progressive x 3-4 days. denies cp. states he usually goes to [facility] which has his records.

## 2024-01-10 NOTE — CARE COORDINATION ASSESSMENT. - OTHER PERTINENT DISCHARGE PLANNING INFORMATION:
CM met with the patient at the bedside, educated pt on role of CM and transition planning. Patient verbalized understanding. CM provided direct contact/resource folder and remains available. Per Pt he resides in a trailer alone, Has  home oxygen through [employer], portable and concentrator 3L, in good working condition. Pt stated that he does not have a caregiver, his emergency contact Horacio is his boss and his friend. Per patient he is independent with ADL's and ambulating and has no other DME.  Denies any prior home care visiting nurse services. CM discussed home care visiting nurse services when cleared for transition home, pt declined. Pt stated that he drives and has his portable oxygen in his car with , and will be driving himself home.  PMD Dr Patel Kuo, [phone], Pharmacy [facility]. CM met with the patient at the bedside, educated pt on role of CM and transition planning. Patient verbalized understanding. CM provided direct contact/resource folder and remains available. Per Pt he resides in a trailer alone, Has  home oxygen through [employer], portable and concentrator 3L, in good working condition. Pt stated that he does not have a caregiver, his emergency contact Horacio is his boss and his friend. Per patient he is independent with ADL's and ambulating and has no other DME.  Denies any prior home care visiting nurse services. CM discussed home care visiting nurse services when cleared for transition home, pt declined. Pt stated that he drives and has his portable oxygen in his car with , and will be driving himself home.  PMD Dr Patel Kuo, [phone], Pharmacy [facility].

## 2024-01-10 NOTE — PROGRESS NOTE ADULT - ATTENDING COMMENTS
67 yo M PMH COPD (on 3L home O2, hx multiple exacerbations treated at [facility] this year), presenting with respiratory distress, sob, cough x3-4 days, occasionally productive of clear sputum. Requiring BIPAP, admitted for acute hypoxic respiratory failure likely 2/2 COPD exacerbation secondary to viral syndrome (Coronavirus). Improving. Continue nebs. Continue solu-medrol q12. Monitor O2 sats. Taper steroids per pulmonary. Titrate O2 as needed to home level of 3L O2 via NC. Lung exam is stable. Hold of CT imaging for now. Leukocytosis trending down. Follow up cultures. D/c antibiotics and monitor off. Pulmonary follow up. 69 yo M PMH COPD (on 3L home O2, hx multiple exacerbations treated at [facility] this year), presenting with respiratory distress, sob, cough x3-4 days, occasionally productive of clear sputum. Requiring BIPAP, admitted for acute hypoxic respiratory failure likely 2/2 COPD exacerbation secondary to viral syndrome (Coronavirus). Improving. Continue nebs. Continue solu-medrol q12. Monitor O2 sats. Taper steroids per pulmonary. Titrate O2 as needed to home level of 3L O2 via NC. Lung exam is stable. Hold of CT imaging for now. Leukocytosis trending down. Follow up cultures. D/c antibiotics and monitor off. Pulmonary follow up.

## 2024-01-10 NOTE — CARE COORDINATION ASSESSMENT. - NSCAREPROVIDERS_GEN_ALL_CORE_FT
CARE PROVIDERS:  Accepting Physician: Dimas Rajan  Administration: Shelbi Maurice  Administration: Romie Mclaughlin  Administration: Marc Haro  Admitting: Dimas Rajan  Attending: Dimas Rajan  Consultant: Lamonte Martinez  Covering Team: Hi Maldonado  Covering Team: Alberto Garcia  ED Attending: Luz Goodwin  ED Nurse: Cortez Nagy  Emergency Medicine: Lisset Crawford  Infection Control: Soco Wiley  Nurse: Cynthia Bunch  Nurse: Domi Jackson  Nurse: Kayode Ponce  Nurse: Rachel Diamond  Nurse: Jayashree Conrad  Ordered: ADM, User  Override: Veronika Giordano  Override: Angeline Sanchez  Override: Mackenzie Razo  Override: Dung Encarnacion  Override: Avinash Piña  Override: Chrery Rodriguez  Override: Lionel Vazquez  PCA/Nursing Assistant: Nolvia Griffin  Physical Therapy: Radhames Ramirez  Primary Team: Osman Braun  Primary Team: Jayashree Epstein  Primary Team: Syeda Barnes  Primary Team: Michelle Martinez  Primary Team: Mikayla Talbert  Registered Dietitian: Chrissie Ochoa  Respiratory Therapy: Frederic Vance  Respiratory Therapy: Armando Casper  : Cristina Doyle  Student: Alisa Tavera  Student: Jeremias Hale  Team: PLV  Hospitalists, Team   CARE PROVIDERS:  Accepting Physician: Dimas Rajan  Administration: Shelbi Maurice  Administration: Romie Mclaughlin  Administration: Marc Haro  Admitting: Dimas Rajan  Attending: Dimas Rajan  Consultant: Lamonte Martinez  Covering Team: Hi Maldonado  Covering Team: Alberto Garcia  ED Attending: Luz Goodwin  ED Nurse: Cortez Nagy  Emergency Medicine: Lisset Crawford  Infection Control: Soco Wiley  Nurse: Cynthia Bunch  Nurse: Domi Jackson  Nurse: Kayode Ponce  Nurse: Rachel Diamond  Nurse: Jayashree Conrad  Ordered: ADM, User  Override: Veronika Giordano  Override: Angeline Sanchez  Override: Mackenzie Razo  Override: Dung Encarnacion  Override: Avinash Piña  Override: Cherry Rodriguez  Override: Lionel Vazquez  PCA/Nursing Assistant: Nolvia Griffin  Physical Therapy: Radhames Ramirez  Primary Team: Osman Braun  Primary Team: Jayashree Epstein  Primary Team: Syeda Barnes  Primary Team: Michelle Martinez  Primary Team: Mikayla Talbert  Registered Dietitian: Chrissie Ochoa  Respiratory Therapy: Frederic Vance  Respiratory Therapy: Armando Casper  : Cristina Doyle  Student: Alisa Tavera  Student: Jeremias Hale  Team: PLV  Hospitalists, Team

## 2024-01-11 ENCOUNTER — TRANSCRIPTION ENCOUNTER (OUTPATIENT)
Age: 69
End: 2024-01-11

## 2024-01-11 VITALS
DIASTOLIC BLOOD PRESSURE: 69 MMHG | OXYGEN SATURATION: 97 % | SYSTOLIC BLOOD PRESSURE: 106 MMHG | HEART RATE: 78 BPM | TEMPERATURE: 98 F | RESPIRATION RATE: 18 BRPM

## 2024-01-11 LAB
ALBUMIN SERPL ELPH-MCNC: 3.2 G/DL — LOW (ref 3.3–5)
ALBUMIN SERPL ELPH-MCNC: 3.2 G/DL — LOW (ref 3.3–5)
ALP SERPL-CCNC: 95 U/L — SIGNIFICANT CHANGE UP (ref 40–120)
ALP SERPL-CCNC: 95 U/L — SIGNIFICANT CHANGE UP (ref 40–120)
ALT FLD-CCNC: 58 U/L — SIGNIFICANT CHANGE UP (ref 12–78)
ALT FLD-CCNC: 58 U/L — SIGNIFICANT CHANGE UP (ref 12–78)
ANION GAP SERPL CALC-SCNC: 3 MMOL/L — LOW (ref 5–17)
ANION GAP SERPL CALC-SCNC: 3 MMOL/L — LOW (ref 5–17)
AST SERPL-CCNC: 33 U/L — SIGNIFICANT CHANGE UP (ref 15–37)
AST SERPL-CCNC: 33 U/L — SIGNIFICANT CHANGE UP (ref 15–37)
BASOPHILS # BLD AUTO: 0.01 K/UL — SIGNIFICANT CHANGE UP (ref 0–0.2)
BASOPHILS # BLD AUTO: 0.01 K/UL — SIGNIFICANT CHANGE UP (ref 0–0.2)
BASOPHILS NFR BLD AUTO: 0.1 % — SIGNIFICANT CHANGE UP (ref 0–2)
BASOPHILS NFR BLD AUTO: 0.1 % — SIGNIFICANT CHANGE UP (ref 0–2)
BILIRUB SERPL-MCNC: 0.2 MG/DL — SIGNIFICANT CHANGE UP (ref 0.2–1.2)
BILIRUB SERPL-MCNC: 0.2 MG/DL — SIGNIFICANT CHANGE UP (ref 0.2–1.2)
BUN SERPL-MCNC: 19 MG/DL — SIGNIFICANT CHANGE UP (ref 7–23)
BUN SERPL-MCNC: 19 MG/DL — SIGNIFICANT CHANGE UP (ref 7–23)
CALCIUM SERPL-MCNC: 8.7 MG/DL — SIGNIFICANT CHANGE UP (ref 8.5–10.1)
CALCIUM SERPL-MCNC: 8.7 MG/DL — SIGNIFICANT CHANGE UP (ref 8.5–10.1)
CHLORIDE SERPL-SCNC: 111 MMOL/L — HIGH (ref 96–108)
CHLORIDE SERPL-SCNC: 111 MMOL/L — HIGH (ref 96–108)
CO2 SERPL-SCNC: 29 MMOL/L — SIGNIFICANT CHANGE UP (ref 22–31)
CO2 SERPL-SCNC: 29 MMOL/L — SIGNIFICANT CHANGE UP (ref 22–31)
CREAT SERPL-MCNC: 0.92 MG/DL — SIGNIFICANT CHANGE UP (ref 0.5–1.3)
CREAT SERPL-MCNC: 0.92 MG/DL — SIGNIFICANT CHANGE UP (ref 0.5–1.3)
EGFR: 89 ML/MIN/1.73M2 — SIGNIFICANT CHANGE UP
EGFR: 89 ML/MIN/1.73M2 — SIGNIFICANT CHANGE UP
EOSINOPHIL # BLD AUTO: 0.01 K/UL — SIGNIFICANT CHANGE UP (ref 0–0.5)
EOSINOPHIL # BLD AUTO: 0.01 K/UL — SIGNIFICANT CHANGE UP (ref 0–0.5)
EOSINOPHIL NFR BLD AUTO: 0.1 % — SIGNIFICANT CHANGE UP (ref 0–6)
EOSINOPHIL NFR BLD AUTO: 0.1 % — SIGNIFICANT CHANGE UP (ref 0–6)
GLUCOSE SERPL-MCNC: 123 MG/DL — HIGH (ref 70–99)
GLUCOSE SERPL-MCNC: 123 MG/DL — HIGH (ref 70–99)
HCT VFR BLD CALC: 36.4 % — LOW (ref 39–50)
HCT VFR BLD CALC: 36.4 % — LOW (ref 39–50)
HGB BLD-MCNC: 11.7 G/DL — LOW (ref 13–17)
HGB BLD-MCNC: 11.7 G/DL — LOW (ref 13–17)
IMM GRANULOCYTES NFR BLD AUTO: 0.7 % — SIGNIFICANT CHANGE UP (ref 0–0.9)
IMM GRANULOCYTES NFR BLD AUTO: 0.7 % — SIGNIFICANT CHANGE UP (ref 0–0.9)
LYMPHOCYTES # BLD AUTO: 1.75 K/UL — SIGNIFICANT CHANGE UP (ref 1–3.3)
LYMPHOCYTES # BLD AUTO: 1.75 K/UL — SIGNIFICANT CHANGE UP (ref 1–3.3)
LYMPHOCYTES # BLD AUTO: 12.2 % — LOW (ref 13–44)
LYMPHOCYTES # BLD AUTO: 12.2 % — LOW (ref 13–44)
MCHC RBC-ENTMCNC: 28.9 PG — SIGNIFICANT CHANGE UP (ref 27–34)
MCHC RBC-ENTMCNC: 28.9 PG — SIGNIFICANT CHANGE UP (ref 27–34)
MCHC RBC-ENTMCNC: 32.1 GM/DL — SIGNIFICANT CHANGE UP (ref 32–36)
MCHC RBC-ENTMCNC: 32.1 GM/DL — SIGNIFICANT CHANGE UP (ref 32–36)
MCV RBC AUTO: 89.9 FL — SIGNIFICANT CHANGE UP (ref 80–100)
MCV RBC AUTO: 89.9 FL — SIGNIFICANT CHANGE UP (ref 80–100)
MONOCYTES # BLD AUTO: 0.7 K/UL — SIGNIFICANT CHANGE UP (ref 0–0.9)
MONOCYTES # BLD AUTO: 0.7 K/UL — SIGNIFICANT CHANGE UP (ref 0–0.9)
MONOCYTES NFR BLD AUTO: 4.9 % — SIGNIFICANT CHANGE UP (ref 2–14)
MONOCYTES NFR BLD AUTO: 4.9 % — SIGNIFICANT CHANGE UP (ref 2–14)
NEUTROPHILS # BLD AUTO: 11.83 K/UL — HIGH (ref 1.8–7.4)
NEUTROPHILS # BLD AUTO: 11.83 K/UL — HIGH (ref 1.8–7.4)
NEUTROPHILS NFR BLD AUTO: 82 % — HIGH (ref 43–77)
NEUTROPHILS NFR BLD AUTO: 82 % — HIGH (ref 43–77)
NRBC # BLD: 0 /100 WBCS — SIGNIFICANT CHANGE UP (ref 0–0)
NRBC # BLD: 0 /100 WBCS — SIGNIFICANT CHANGE UP (ref 0–0)
PLATELET # BLD AUTO: 230 K/UL — SIGNIFICANT CHANGE UP (ref 150–400)
PLATELET # BLD AUTO: 230 K/UL — SIGNIFICANT CHANGE UP (ref 150–400)
POTASSIUM SERPL-MCNC: 4.8 MMOL/L — SIGNIFICANT CHANGE UP (ref 3.5–5.3)
POTASSIUM SERPL-MCNC: 4.8 MMOL/L — SIGNIFICANT CHANGE UP (ref 3.5–5.3)
POTASSIUM SERPL-SCNC: 4.8 MMOL/L — SIGNIFICANT CHANGE UP (ref 3.5–5.3)
POTASSIUM SERPL-SCNC: 4.8 MMOL/L — SIGNIFICANT CHANGE UP (ref 3.5–5.3)
PROT SERPL-MCNC: 7.3 G/DL — SIGNIFICANT CHANGE UP (ref 6–8.3)
PROT SERPL-MCNC: 7.3 G/DL — SIGNIFICANT CHANGE UP (ref 6–8.3)
RBC # BLD: 4.05 M/UL — LOW (ref 4.2–5.8)
RBC # BLD: 4.05 M/UL — LOW (ref 4.2–5.8)
RBC # FLD: 15.3 % — HIGH (ref 10.3–14.5)
RBC # FLD: 15.3 % — HIGH (ref 10.3–14.5)
SODIUM SERPL-SCNC: 143 MMOL/L — SIGNIFICANT CHANGE UP (ref 135–145)
SODIUM SERPL-SCNC: 143 MMOL/L — SIGNIFICANT CHANGE UP (ref 135–145)
WBC # BLD: 14.4 K/UL — HIGH (ref 3.8–10.5)
WBC # BLD: 14.4 K/UL — HIGH (ref 3.8–10.5)
WBC # FLD AUTO: 14.4 K/UL — HIGH (ref 3.8–10.5)
WBC # FLD AUTO: 14.4 K/UL — HIGH (ref 3.8–10.5)

## 2024-01-11 PROCEDURE — 99239 HOSP IP/OBS DSCHRG MGMT >30: CPT

## 2024-01-11 PROCEDURE — 96375 TX/PRO/DX INJ NEW DRUG ADDON: CPT

## 2024-01-11 PROCEDURE — 83880 ASSAY OF NATRIURETIC PEPTIDE: CPT

## 2024-01-11 PROCEDURE — 85025 COMPLETE CBC W/AUTO DIFF WBC: CPT

## 2024-01-11 PROCEDURE — 71045 X-RAY EXAM CHEST 1 VIEW: CPT

## 2024-01-11 PROCEDURE — 82550 ASSAY OF CK (CPK): CPT

## 2024-01-11 PROCEDURE — 94660 CPAP INITIATION&MGMT: CPT

## 2024-01-11 PROCEDURE — 84484 ASSAY OF TROPONIN QUANT: CPT

## 2024-01-11 PROCEDURE — 83735 ASSAY OF MAGNESIUM: CPT

## 2024-01-11 PROCEDURE — 86803 HEPATITIS C AB TEST: CPT

## 2024-01-11 PROCEDURE — 85610 PROTHROMBIN TIME: CPT

## 2024-01-11 PROCEDURE — 97162 PT EVAL MOD COMPLEX 30 MIN: CPT

## 2024-01-11 PROCEDURE — 99291 CRITICAL CARE FIRST HOUR: CPT | Mod: 25

## 2024-01-11 PROCEDURE — 83036 HEMOGLOBIN GLYCOSYLATED A1C: CPT

## 2024-01-11 PROCEDURE — 80061 LIPID PANEL: CPT

## 2024-01-11 PROCEDURE — 87899 AGENT NOS ASSAY W/OPTIC: CPT

## 2024-01-11 PROCEDURE — 96374 THER/PROPH/DIAG INJ IV PUSH: CPT

## 2024-01-11 PROCEDURE — 36415 COLL VENOUS BLD VENIPUNCTURE: CPT

## 2024-01-11 PROCEDURE — 85379 FIBRIN DEGRADATION QUANT: CPT

## 2024-01-11 PROCEDURE — 93005 ELECTROCARDIOGRAM TRACING: CPT

## 2024-01-11 PROCEDURE — 83605 ASSAY OF LACTIC ACID: CPT

## 2024-01-11 PROCEDURE — 87449 NOS EACH ORGANISM AG IA: CPT

## 2024-01-11 PROCEDURE — 94640 AIRWAY INHALATION TREATMENT: CPT

## 2024-01-11 PROCEDURE — 85730 THROMBOPLASTIN TIME PARTIAL: CPT

## 2024-01-11 PROCEDURE — 87040 BLOOD CULTURE FOR BACTERIA: CPT

## 2024-01-11 PROCEDURE — 94760 N-INVAS EAR/PLS OXIMETRY 1: CPT

## 2024-01-11 PROCEDURE — 0225U NFCT DS DNA&RNA 21 SARSCOV2: CPT

## 2024-01-11 PROCEDURE — 80053 COMPREHEN METABOLIC PANEL: CPT

## 2024-01-11 PROCEDURE — 82803 BLOOD GASES ANY COMBINATION: CPT

## 2024-01-11 RX ORDER — IPRATROPIUM BROMIDE 0.2 MG/ML
2 SOLUTION, NON-ORAL INHALATION
Refills: 0 | DISCHARGE

## 2024-01-11 RX ORDER — FLUTICASONE FUROATE, UMECLIDINIUM BROMIDE AND VILANTEROL TRIFENATATE 200; 62.5; 25 UG/1; UG/1; UG/1
1 POWDER RESPIRATORY (INHALATION)
Refills: 0 | DISCHARGE

## 2024-01-11 RX ADMIN — Medication 3 MILLILITER(S): at 07:51

## 2024-01-11 RX ADMIN — Medication 200 MILLIGRAM(S): at 05:50

## 2024-01-11 RX ADMIN — Medication 100 MILLIGRAM(S): at 05:50

## 2024-01-11 RX ADMIN — Medication 200 MILLIGRAM(S): at 11:30

## 2024-01-11 RX ADMIN — Medication 3 MILLILITER(S): at 01:06

## 2024-01-11 RX ADMIN — Medication 3 MILLILITER(S): at 12:51

## 2024-01-11 NOTE — DISCHARGE NOTE PROVIDER - NSDCMRMEDTOKEN_GEN_ALL_CORE_FT
Atrovent HFA 17 mcg/inh inhalation aerosol: 2 puff(s) by metered dose inhaler 4 times a day  predniSONE 50 mg oral tablet: 1 tab(s) orally once a day  Trelegy Ellipta 100 mcg-62.5 mcg-25 mcg/inh inhalation powder: 1 puff(s) inhaled once a day

## 2024-01-11 NOTE — DISCHARGE NOTE PROVIDER - CARE PROVIDER_API CALL
Patel Crabtree.  [address] [address]  Phone: [phone]  Fax: [phone]  Follow Up Time:    Patel Crabtree.  [address] [address]  Phone: [phone]  Fax: [phone]  Follow Up Time:    [place] [address]  Phone: [phone]  Fax: [phone]  Follow Up Time:     Mynor Garcia  Critical Care Medicine  Follow Up Time:    [place] [address]  Phone: [phone]  Fax: [phone]  Follow Up Time:     Mynor Garcia  Critical Care Medicine  Follow Up Time:

## 2024-01-11 NOTE — CASE MANAGEMENT PROGRESS NOTE - NSCMPROGRESSNOTE_GEN_ALL_CORE
Pt was cleared medically for transition home today. Pt is in agreement with the transition plan home. Has declined the need for home care services. The pt states he will make an appt with his PC/pulmonary MD when he gets home, that he is very good with his follow-up appts. The pt has home oxygen portable and concentrator in the home. The pt has the portable in the car and will transport himself home. The bedside nurse is aware of the above plan, and aware that the pt will need to be taken to his car with oxygen to switch over to his portable oxygen. CM team to remain available for needs.  Pt was cleared medically for transition home today. Pt is in agreement with the transition plan home. Has declined the need for home care services. The pt states he will make an appt with his PC/pulmonary MD when he gets home, that he is very good with his follow-up appts. The pt has home oxygen portable and concentrator in the home. The pt wears 3-4L via NC and the pt is presently on that in the hospital. The pt has the portable in the car and will transport himself home. The bedside nurse is aware of the above plan, and aware that the pt will need to be taken to his car with oxygen to switch over to his portable oxygen. CM team to remain available for needs.

## 2024-01-11 NOTE — DISCHARGE NOTE NURSING/CASE MANAGEMENT/SOCIAL WORK - NSDCPEFALRISK_GEN_ALL_CORE
For information on Fall & Injury Prevention, visit: [email or web address] OR  [email or web address] OR  https://www.cdc.gov/steadi/patient.html For information on Fall & Injury Prevention, visit: [email or web address] OR  [email or web address]://[email or web address]-[email or web address] OR  https://www.cdc.gov/steadi/patient.html

## 2024-01-11 NOTE — DISCHARGE NOTE NURSING/CASE MANAGEMENT/SOCIAL WORK - PATIENT PORTAL LINK FT
You can access the FollowMyHealth Patient Portal offered by [facility] by registering at the following website: [email or web address]. By joining [employer]’s FollowMyHealth portal, you will also be able to view your health information using other applications (apps) compatible with our system. You can access the FollowMyHealth Patient Portal offered by [facility] by registering at the following website: [email or web address]. By joining [employer]’s FollowMyHealth portal, you will also be able to view your health information using other applications (apps) compatible with our system.

## 2024-01-11 NOTE — PROGRESS NOTE ADULT - SUBJECTIVE AND OBJECTIVE BOX
Date/Time Patient Seen:  		  Referring MD:   Data Reviewed	       Patient is a 68y old  Male who presents with a chief complaint of acute hypoxic respiratory failure (10 Ministerio 2024 11:05)      Subjective/HPI     PAST MEDICAL & SURGICAL HISTORY:  COPD, severe          Medication list         MEDICATIONS  (STANDING):  albuterol/ipratropium for Nebulization 3 milliLiter(s) Nebulizer every 6 hours  benzonatate 100 milliGRAM(s) Oral three times a day  budesonide 160 MICROgram(s)/formoterol 4.5 MICROgram(s) Inhaler 2 Puff(s) Inhalation two times a day  enoxaparin Injectable 40 milliGRAM(s) SubCutaneous every 24 hours  guaiFENesin Oral Liquid (Sugar-Free) 200 milliGRAM(s) Oral every 6 hours  influenza  Vaccine (HIGH DOSE) 0.7 milliLiter(s) IntraMuscular once  methylPREDNISolone sodium succinate Injectable 40 milliGRAM(s) IV Push two times a day    MEDICATIONS  (PRN):  acetaminophen     Tablet .. 650 milliGRAM(s) Oral every 6 hours PRN Temp greater or equal to 38C (100.4F), Mild Pain (1 - 3)  aluminum hydroxide/magnesium hydroxide/simethicone Suspension 30 milliLiter(s) Oral every 4 hours PRN Dyspepsia  melatonin 3 milliGRAM(s) Oral at bedtime PRN Insomnia         Vitals log        ICU Vital Signs Last 24 Hrs  T(C): 36.7 (11 Jan 2024 04:37), Max: 36.7 (10 Ministerio 2024 05:54)  T(F): 98 (11 Jan 2024 04:37), Max: 98 (10 Ministerio 2024 05:54)  HR: 79 (11 Jan 2024 04:37) (71 - 96)  BP: 113/71 (11 Jan 2024 04:37) (107/73 - 150/66)  BP(mean): --  ABP: --  ABP(mean): --  RR: 17 (11 Jan 2024 04:37) (17 - 22)  SpO2: 98% (11 Jan 2024 04:37) (93% - 98%)    O2 Parameters below as of 11 Jan 2024 04:37  Patient On (Oxygen Delivery Method): nasal cannula                 Input and Output:  I&O's Detail      Lab Data                        12.5   15.74 )-----------( 208      ( 10 Ministerio 2024 08:55 )             37.5     01-10    138  |  107  |  23  ----------------------------<  131<H>  4.4   |  25  |  0.85    Ca    8.6      10 Ministerio 2024 08:55  Mg     2.0     01-09    TPro  7.4  /  Alb  3.2<L>  /  TBili  0.4  /  DBili  x   /  AST  21  /  ALT  37  /  AlkPhos  87  01-10    ABG - ( 09 Jan 2024 13:54 )  pH, Arterial: 7.40  pH, Blood: x     /  pCO2: 42    /  pO2: 179   / HCO3: 26    / Base Excess: 1.2   /  SaO2: 99.9              CARDIAC MARKERS ( 09 Jan 2024 13:25 )  x     / x     / 126 U/L / x     / x            Review of Systems	      Objective     Physical Examination    heart s1s2  lung dec BS  head nc      Pertinent Lab findings & Imaging      Mark:  NO   Adequate UO     I&O's Detail           Discussed with:     Cultures:	        Radiology

## 2024-01-11 NOTE — PROGRESS NOTE ADULT - ASSESSMENT
69 yo M PMH COPD (on 3L home O2, hx multiple exacerbations treated at [facility] this year), presenting with respiratory distress, sob, cough x3-4 days, occasionally productive of clear sputum.    copd  copd ex  resp distress  URI    s/p NIV  nc o2 support  nebs - steroids - inhaler  tele monitored    s/p niv use  wean as tolerated  ABG noted  cxr NAPD  bronchodilators - ics - steroids -   HOB elev  asp prec  monitor VS and HD and Sat  monitored unit admission   goal sat > 88 pct  consideration for CT imaging if there is no improvement   URI - sx management 67 yo M PMH COPD (on 3L home O2, hx multiple exacerbations treated at [facility] this year), presenting with respiratory distress, sob, cough x3-4 days, occasionally productive of clear sputum.    copd  copd ex  resp distress  URI    s/p NIV  nc o2 support  nebs - steroids - inhaler  tele monitored    s/p niv use  wean as tolerated  ABG noted  cxr NAPD  bronchodilators - ics - steroids -   HOB elev  asp prec  monitor VS and HD and Sat  monitored unit admission   goal sat > 88 pct  consideration for CT imaging if there is no improvement   URI - sx management

## 2024-01-11 NOTE — DISCHARGE NOTE PROVIDER - PROVIDER TOKENS
PROVIDER:[TOKEN:[[id number]]] PROVIDER:[TOKEN:[[id number]]] PROVIDER:[TOKEN:[[id number]]],PROVIDER:[TOKEN:[[id number]]] PROVIDER:[TOKEN:[[id number]]],PROVIDER:[TOKEN:[[id number]]]

## 2024-01-11 NOTE — DISCHARGE NOTE PROVIDER - HOSPITAL COURSE
ADMISSION DATE:  01-09-24    ---  FROM ADMISSION H+P:   HPI:  69 yo M PMH COPD (on 3L home O2, hx multiple exacerbations treated at [facility] this year), presenting with respiratory distress, sob, cough x3-4 days, occasionally productive of clear sputum. Denies sick contacts, recent travel, recent illness. Denies fevers/chills, HA, cp, palpitations, n/v/d/c, dysuria, dyschezia, hematuria, hematochezia. Endorses this current episode feels very similar to prior COPD exacerbations.     ED Course:  Vitals: T not measured, , BP not measured, RR 40, SpO2 86% ORA --> T 99.2 F, , /74, RR 20, SpO2 97% on BIPAP 12/5, FiO2 40%  Labs: WBC 17.42, N-Lebron 83%, pO2 arterial 179  EKG: excessive noise, unable to make personal read, reordered  Given: Azithromycin 500 mg IV x1, Rocephin 1g IV x1, Magnesium 2g IV x1, Duoneb x3, Solu-Medrol x1  CXR: Clear lungs with no acute cardiopulmonary abnormalities. (09 Jan 2024 15:59)      ---  HOSPITAL COURSE/PERTINENT LABS/PROCEDURES PERFORMED/PENDING TESTS:    Pt admitted with acute hypoxic respiratory failure 2/2 COPD exacerbation 2/2 viral infection -- coronavirus detected. S/p bipap -- now satting well on 4L NC (uses 3-4L NC o2 at home). Pt given solumedrol and duoneb and improved. Abx started but then dc'ed due to low suspicion for bacterial etiology. CXR negative. Strep pneumo and legionella urine antigen negative. COVID-19 negative. NGTD on cultures. WBC downtrending --- leukocytosis likely in setting of viral illness and recent prednisone use. Pulm consulted, recs appreciated. Pt improved and back to baseline respiratory status.      The patient was seen by physical therapy who recommended no skilled PT needs. The patient was seen and examined on the day of discharge. The patient is medically optimized for discharge to home.    ---  PATIENT CONDITION:  - stable    ---  PHYSICAL EXAM ON DAY OF DISCHARGE:  Vital Signs (24 Hrs):  T(C): 36.7 (01-11-24 @ 04:37), Max: 36.7 (01-10-24 @ 20:39)  HR: 74 (01-11-24 @ 07:51) (71 - 96)  BP: 113/71 (01-11-24 @ 04:37) (107/73 - 119/74)  RR: 17 (01-11-24 @ 04:37) (17 - 18)  SpO2: 98% (01-11-24 @ 07:51) (93% - 98%)    PHYSICAL EXAM:  GENERAL: NAD  CHEST/LUNG: Decreased breath sounds, no wheeze/rhonchi/rales  HEART: Regular rate and rhythm; +s1/s2  ABDOMEN: Bowel sounds present; Soft, Nontender, Nondistended.   EXTREMITIES:  2+ Peripheral Pulses. No clubbing, cyanosis, or edema  NERVOUS SYSTEM:  Alert & Oriented X3, speech clear  PSYCH: restless    ---  CONSULTANTS:   Pulm - Dr. Martinez    ---  ADVANCED CARE PLANNING:  - Code status:      - MOLST completed:      [  ] NO     [  ] YES    ---  TIME SPENT:  I, the attending physician, was physically present for the key portions of the evaluation and management (E/M) service provided. The total amount of time spent reviewing the hospital notes, laboratory values, imaging findings, assessing/counseling the patient, discussing with consultant physicians, social work, nursing staff was -- minutes ADMISSION DATE:  01-09-24    ---  FROM ADMISSION H+P:   HPI:  67 yo M PMH COPD (on 3L home O2, hx multiple exacerbations treated at [facility] this year), presenting with respiratory distress, sob, cough x3-4 days, occasionally productive of clear sputum. Denies sick contacts, recent travel, recent illness. Denies fevers/chills, HA, cp, palpitations, n/v/d/c, dysuria, dyschezia, hematuria, hematochezia. Endorses this current episode feels very similar to prior COPD exacerbations.     ED Course:  Vitals: T not measured, , BP not measured, RR 40, SpO2 86% ORA --> T 99.2 F, , /74, RR 20, SpO2 97% on BIPAP 12/5, FiO2 40%  Labs: WBC 17.42, N-Lebron 83%, pO2 arterial 179  EKG: excessive noise, unable to make personal read, reordered  Given: Azithromycin 500 mg IV x1, Rocephin 1g IV x1, Magnesium 2g IV x1, Duoneb x3, Solu-Medrol x1  CXR: Clear lungs with no acute cardiopulmonary abnormalities. (09 Jan 2024 15:59)      ---  HOSPITAL COURSE/PERTINENT LABS/PROCEDURES PERFORMED/PENDING TESTS:    Pt admitted with acute hypoxic respiratory failure 2/2 COPD exacerbation 2/2 viral infection -- coronavirus detected. S/p bipap -- now satting well on 4L NC (uses 3-4L NC o2 at home). Pt given solumedrol and duoneb and improved. Abx started but then dc'ed due to low suspicion for bacterial etiology. CXR negative. Strep pneumo and legionella urine antigen negative. COVID-19 negative. NGTD on cultures. WBC downtrending --- leukocytosis likely in setting of viral illness and recent prednisone use. Pulm consulted, recs appreciated. Pt improved and back to baseline respiratory status.      The patient was seen by physical therapy who recommended no skilled PT needs. The patient was seen and examined on the day of discharge. The patient is medically optimized for discharge to home.    ---  PATIENT CONDITION:  - stable    ---  PHYSICAL EXAM ON DAY OF DISCHARGE:  Vital Signs (24 Hrs):  T(C): 36.7 (01-11-24 @ 04:37), Max: 36.7 (01-10-24 @ 20:39)  HR: 74 (01-11-24 @ 07:51) (71 - 96)  BP: 113/71 (01-11-24 @ 04:37) (107/73 - 119/74)  RR: 17 (01-11-24 @ 04:37) (17 - 18)  SpO2: 98% (01-11-24 @ 07:51) (93% - 98%)    PHYSICAL EXAM:  GENERAL: NAD  CHEST/LUNG: Decreased breath sounds, no wheeze/rhonchi/rales  HEART: Regular rate and rhythm; +s1/s2  ABDOMEN: Bowel sounds present; Soft, Nontender, Nondistended.   EXTREMITIES:  2+ Peripheral Pulses. No clubbing, cyanosis, or edema  NERVOUS SYSTEM:  Alert & Oriented X3, speech clear  PSYCH: restless    ---  CONSULTANTS:   Pulm - Dr. Martinez    ---  ADVANCED CARE PLANNING:  - Code status:      - MOLST completed:      [  ] NO     [  ] YES    ---  TIME SPENT:  I, the attending physician, was physically present for the key portions of the evaluation and management (E/M) service provided. The total amount of time spent reviewing the hospital notes, laboratory values, imaging findings, assessing/counseling the patient, discussing with consultant physicians, social work, nursing staff was -- minutes ADMISSION DATE:  01-09-24    ---  FROM ADMISSION H+P:   HPI:  67 yo M PMH COPD (on 3L home O2, hx multiple exacerbations treated at [facility] this year), presenting with respiratory distress, sob, cough x3-4 days, occasionally productive of clear sputum. Denies sick contacts, recent travel, recent illness. Denies fevers/chills, HA, cp, palpitations, n/v/d/c, dysuria, dyschezia, hematuria, hematochezia. Endorses this current episode feels very similar to prior COPD exacerbations.     ED Course:  Vitals: T not measured, , BP not measured, RR 40, SpO2 86% ORA --> T 99.2 F, , /74, RR 20, SpO2 97% on BIPAP 12/5, FiO2 40%  Labs: WBC 17.42, N-Lebron 83%, pO2 arterial 179  EKG: excessive noise, unable to make personal read, reordered  Given: Azithromycin 500 mg IV x1, Rocephin 1g IV x1, Magnesium 2g IV x1, Duoneb x3, Solu-Medrol x1  CXR: Clear lungs with no acute cardiopulmonary abnormalities. (09 Jan 2024 15:59)      ---  HOSPITAL COURSE/PERTINENT LABS/PROCEDURES PERFORMED/PENDING TESTS:    Pt admitted with acute hypoxic respiratory failure 2/2 COPD exacerbation 2/2 viral infection -- coronavirus detected. S/p bipap -- now satting 98% on 3L NC (uses 3-4L NC o2 at home). Pt given solumedrol and duoneb and improved. Abx started but then dc'ed due to low suspicion for bacterial etiology. CXR negative. Strep pneumo and legionella urine antigen negative. COVID-19 negative. NGTD on cultures. WBC downtrending --- leukocytosis likely in setting of viral illness and recent prednisone use. Pulm consulted, recs appreciated. Pt improved and back to baseline respiratory status.      The patient was seen by physical therapy who recommended no skilled PT needs. The patient was seen and examined on the day of discharge. The patient is medically optimized for discharge to home.    ---  PATIENT CONDITION:  - stable    ---  PHYSICAL EXAM ON DAY OF DISCHARGE:  Vital Signs (24 Hrs):  T(C): 36.7 (01-11-24 @ 04:37), Max: 36.7 (01-10-24 @ 20:39)  HR: 74 (01-11-24 @ 07:51) (71 - 96)  BP: 113/71 (01-11-24 @ 04:37) (107/73 - 119/74)  RR: 17 (01-11-24 @ 04:37) (17 - 18)  SpO2: 98% (01-11-24 @ 07:51) (93% - 98%)    PHYSICAL EXAM:  GENERAL: NAD  CHEST/LUNG: Decreased breath sounds, no wheeze/rhonchi/rales  HEART: Regular rate and rhythm; +s1/s2  ABDOMEN: Bowel sounds present; Soft, Nontender, Nondistended.   EXTREMITIES:  2+ Peripheral Pulses. No clubbing, cyanosis, or edema  NERVOUS SYSTEM:  Alert & Oriented X3, speech clear  PSYCH: restless    ---  CONSULTANTS:   Pulm - Dr. Martinez    ---  ADVANCED CARE PLANNING:  - Code status:      - MOLST completed:      [  ] NO     [  ] YES    ---  TIME SPENT:  I, the attending physician, was physically present for the key portions of the evaluation and management (E/M) service provided. The total amount of time spent reviewing the hospital notes, laboratory values, imaging findings, assessing/counseling the patient, discussing with consultant physicians, social work, nursing staff was -- minutes ADMISSION DATE:  01-09-24    ---  FROM ADMISSION H+P:   HPI:  67 yo M PMH COPD (on 3L home O2, hx multiple exacerbations treated at [facility] this year), presenting with respiratory distress, sob, cough x3-4 days, occasionally productive of clear sputum. Denies sick contacts, recent travel, recent illness. Denies fevers/chills, HA, cp, palpitations, n/v/d/c, dysuria, dyschezia, hematuria, hematochezia. Endorses this current episode feels very similar to prior COPD exacerbations.     ED Course:  Vitals: T not measured, , BP not measured, RR 40, SpO2 86% ORA --> T 99.2 F, , /74, RR 20, SpO2 97% on BIPAP 12/5, FiO2 40%  Labs: WBC 17.42, N-Lebron 83%, pO2 arterial 179  EKG: excessive noise, unable to make personal read, reordered  Given: Azithromycin 500 mg IV x1, Rocephin 1g IV x1, Magnesium 2g IV x1, Duoneb x3, Solu-Medrol x1  CXR: Clear lungs with no acute cardiopulmonary abnormalities. (09 Jan 2024 15:59)      ---  HOSPITAL COURSE/PERTINENT LABS/PROCEDURES PERFORMED/PENDING TESTS:    Pt admitted with acute hypoxic respiratory failure 2/2 COPD exacerbation 2/2 viral infection -- coronavirus detected. S/p bipap -- now satting 98% on 3L NC (uses 3-4L NC o2 at home). Pt given solumedrol and duoneb and improved. Abx started but then dc'ed due to low suspicion for bacterial etiology. CXR negative. Strep pneumo and legionella urine antigen negative. COVID-19 negative. NGTD on cultures. WBC downtrending --- leukocytosis likely in setting of viral illness and recent prednisone use. Pulm consulted, recs appreciated. Pt improved and back to baseline respiratory status.      The patient was seen by physical therapy who recommended no skilled PT needs. The patient was seen and examined on the day of discharge. The patient is medically optimized for discharge to home.    ---  PATIENT CONDITION:  - stable    ---  PHYSICAL EXAM ON DAY OF DISCHARGE:  Vital Signs (24 Hrs):  T(C): 36.7 (01-11-24 @ 04:37), Max: 36.7 (01-10-24 @ 20:39)  HR: 74 (01-11-24 @ 07:51) (71 - 96)  BP: 113/71 (01-11-24 @ 04:37) (107/73 - 119/74)  RR: 17 (01-11-24 @ 04:37) (17 - 18)  SpO2: 98% (01-11-24 @ 07:51) (93% - 98%)    PHYSICAL EXAM:  GENERAL: NAD  CHEST/LUNG: Decreased breath sounds, no wheeze/rhonchi/rales  HEART: Regular rate and rhythm; +s1/s2  ABDOMEN: Bowel sounds present; Soft, Nontender, Nondistended.   EXTREMITIES:  2+ Peripheral Pulses. No clubbing, cyanosis, or edema  NERVOUS SYSTEM:  Alert & Oriented X3, speech clear  PSYCH: restless    ---  CONSULTANTS:   Pulm - Dr. Martinez    ---  ADVANCED CARE PLANNING:  - Code status:      - MOLST completed:      [  ] NO     [  ] YES    ---  TIME SPENT:  I, the attending physician, was physically present for the key portions of the evaluation and management (E/M) service provided. The total amount of time spent reviewing the hospital notes, laboratory values, imaging findings, assessing/counseling the patient, discussing with consultant physicians, social work, nursing staff was -- minutes

## 2024-01-11 NOTE — DISCHARGE NOTE PROVIDER - NSDCCPCAREPLAN_GEN_ALL_CORE_FT
PRINCIPAL DISCHARGE DIAGNOSIS  Diagnosis: Acute on chronic hypoxic respiratory failure  Assessment and Plan of Treatment: You were found to have low oxygen saturation. This was likely due to a COPD exacerbation in the setting of a viral infection. You were found to be positive for a coronavirus, not COVID-19, which is a viral illness that causes the common cold. However, with your underlying chronic lung disease, you are more susceptible to complications and issues with your breathing. You were given steroid and nebulizer treatments and your breathing and oxygen requirements returns back to your baseline.  Please follow up with your PCP and pulmonologist within one week of discharge for a hospital follow up visit.      SECONDARY DISCHARGE DIAGNOSES  Diagnosis: COPD exacerbation  Assessment and Plan of Treatment: You had a COPD exacerbation secondary to a viral illness. You required bipap to stabilize your respiratory status. You were weaned back to nasal cannula and improved to baseline.   Please follow up with your pulmonologist upon discharge.     PRINCIPAL DISCHARGE DIAGNOSIS  Diagnosis: Acute on chronic hypoxic respiratory failure  Assessment and Plan of Treatment: You were found to have low oxygen saturation. This was likely due to a COPD exacerbation in the setting of a viral infection. You were found to be positive for a coronavirus, not COVID-19, which is a viral illness that causes the common cold. However, with your underlying chronic lung disease, you are more susceptible to complications and issues with your breathing. You were given steroid and nebulizer treatments and your breathing and oxygen requirements returns back to your baseline.  ***PLEASE TAKE PREDNISONE 50MG ONCE DAILY FOR THE NEXT FIVE DAYS.  You may need a longer course of steriods given your frequenct COPD exacerbations -- please see your pulmonologist for for further management.  ***Please follow up with your PCP and PULMONOLOGIST within 5 DAYS of discharge for a hospital follow up visit.      SECONDARY DISCHARGE DIAGNOSES  Diagnosis: COPD exacerbation  Assessment and Plan of Treatment: You had a COPD exacerbation secondary to a viral illness. You required bipap to stabilize your respiratory status. You were weaned back to nasal cannula and improved to baseline.   Please follow up with your pulmonologist upon discharge.     PRINCIPAL DISCHARGE DIAGNOSIS  Diagnosis: Acute on chronic hypoxic respiratory failure  Assessment and Plan of Treatment: You were found to have low oxygen saturation. This was likely due to a COPD exacerbation in the setting of a viral infection. You were found to be positive for a coronavirus, not COVID-19, which is a viral illness that causes the common cold. However, with your underlying chronic lung disease, you are more susceptible to complications and issues with your breathing. You were given steroid and nebulizer treatments and your breathing and oxygen requirements returns back to your baseline.  ***PLEASE TAKE PREDNISONE 50MG ONCE DAILY FOR THE NEXT FIVE DAYS.  You may need a longer course of steriods given your frequenct COPD exacerbations -- please see your pulmonologist for for further management.  ***Please follow up with your PCP and PULMONOLOGIST Dr. Garcia within 5 DAYS of discharge for a hospital follow up visit.      SECONDARY DISCHARGE DIAGNOSES  Diagnosis: COPD exacerbation  Assessment and Plan of Treatment: You had a COPD exacerbation secondary to a viral illness. You required bipap to stabilize your respiratory status. You were weaned back to nasal cannula and improved to baseline.   Please follow up with your pulmonologist upon discharge.

## 2024-01-15 LAB
CULTURE RESULTS: SIGNIFICANT CHANGE UP
SPECIMEN SOURCE: SIGNIFICANT CHANGE UP

## 2024-02-15 ENCOUNTER — OFFICE (OUTPATIENT)
Dept: URBAN - METROPOLITAN AREA CLINIC 63 | Facility: CLINIC | Age: 69
Setting detail: OPHTHALMOLOGY
End: 2024-02-15
Payer: MEDICARE

## 2024-02-15 DIAGNOSIS — H11.153: ICD-10-CM

## 2024-02-15 DIAGNOSIS — H25.13: ICD-10-CM

## 2024-02-15 PROCEDURE — 92012 INTRM OPH EXAM EST PATIENT: CPT | Performed by: STUDENT IN AN ORGANIZED HEALTH CARE EDUCATION/TRAINING PROGRAM

## 2024-02-15 ASSESSMENT — CONFRONTATIONAL VISUAL FIELD TEST (CVF)
OD_FINDINGS: FULL
OS_FINDINGS: FULL

## 2024-02-15 ASSESSMENT — REFRACTION_MANIFEST
OS_VA2: 20/25(J1)
OD_VA1: 20/150
OD_AXIS: 104
OS_VA1: 20/70
OS_ADD: +2.50
OS_VA1: 20/50
OS_CYLINDER: -1.50
OS_VA1: 20/50
OD_SPHERE: -1.50
OU_VA: 20/50
OD_SPHERE: -1.50
OD_AXIS: 100
OS_CYLINDER: -1.50
OS_SPHERE: -0.50
OS_AXIS: 080
OS_AXIS: 080
OD_VA2: 20/20
OS_ADD: +3.00
OS_CYLINDER: -0.75
OD_AXIS: 100
OD_VA1: 20/50
OD_CYLINDER: -1.75
OD_CYLINDER: -1.75
OD_ADD: +2.50
OD_VA1: 20/40
OU_VA: 20/40
OS_AXIS: 081
OD_CYLINDER: -2.25
OS_SPHERE: -1.25
OD_SPHERE: -1.50
OD_VA2: 20/25(J1)
OS_VA2: 20/20
OD_ADD: +3.00
OS_SPHERE: -0.75

## 2024-02-15 ASSESSMENT — REFRACTION_CURRENTRX
OD_AXIS: 103
OS_VPRISM_DIRECTION: SV
OS_AXIS: 081
OS_SPHERE: -0.75
OD_OVR_VA: 20/
OS_CYLINDER: -1.50
OS_OVR_VA: 20/
OD_VPRISM_DIRECTION: SV
OD_SPHERE: -1.50
OD_CYLINDER: -1.75

## 2024-02-15 ASSESSMENT — REFRACTION_AUTOREFRACTION
OS_AXIS: 079
OS_SPHERE: -1.50
OD_SPHERE: -1.75
OD_CYLINDER: -1.75
OS_CYLINDER: -1.00
OD_AXIS: 107

## 2024-02-15 ASSESSMENT — SPHEQUIV_DERIVED
OS_SPHEQUIV: -2
OS_SPHEQUIV: -1.25
OS_SPHEQUIV: -1.5
OD_SPHEQUIV: -2.375
OS_SPHEQUIV: -1.625
OD_SPHEQUIV: -2.625
OD_SPHEQUIV: -2.375
OD_SPHEQUIV: -2.625

## 2024-04-18 ENCOUNTER — OFFICE (OUTPATIENT)
Dept: URBAN - METROPOLITAN AREA CLINIC 63 | Facility: CLINIC | Age: 69
Setting detail: OPHTHALMOLOGY
End: 2024-04-18
Payer: MEDICARE

## 2024-04-18 DIAGNOSIS — H25.11: ICD-10-CM

## 2024-04-18 DIAGNOSIS — H11.153: ICD-10-CM

## 2024-04-18 DIAGNOSIS — H25.13: ICD-10-CM

## 2024-04-18 PROBLEM — H25.12 CATARACT SENILE NUCLEAR SCLEROSIS; RIGHT EYE, LEFT EYE, BOTH EYES: Status: ACTIVE | Noted: 2024-04-18

## 2024-04-18 PROCEDURE — 92136 OPHTHALMIC BIOMETRY: CPT | Mod: RT | Performed by: STUDENT IN AN ORGANIZED HEALTH CARE EDUCATION/TRAINING PROGRAM

## 2024-04-18 PROCEDURE — 99213 OFFICE O/P EST LOW 20 MIN: CPT | Performed by: STUDENT IN AN ORGANIZED HEALTH CARE EDUCATION/TRAINING PROGRAM

## 2024-05-29 ENCOUNTER — OFFICE (OUTPATIENT)
Dept: URBAN - METROPOLITAN AREA CLINIC 109 | Facility: CLINIC | Age: 69
Setting detail: OPHTHALMOLOGY
End: 2024-05-29
Payer: MEDICARE

## 2024-05-29 DIAGNOSIS — H11.153: ICD-10-CM

## 2024-05-29 DIAGNOSIS — H52.213: ICD-10-CM

## 2024-05-29 DIAGNOSIS — H25.11: ICD-10-CM

## 2024-05-29 DIAGNOSIS — H25.13: ICD-10-CM

## 2024-05-29 PROBLEM — E11.9 DIABETES TYPE 2 NO RETINOPATHY: Status: ACTIVE | Noted: 2024-05-29

## 2024-05-29 PROBLEM — H25.12 CATARACT SENILE NUCLEAR SCLEROSIS; RIGHT EYE, LEFT EYE, BOTH EYES: Status: ACTIVE | Noted: 2024-05-29

## 2024-05-29 PROCEDURE — 92025 CPTRIZED CORNEAL TOPOGRAPHY: CPT | Performed by: OPHTHALMOLOGY

## 2024-05-29 PROCEDURE — 99214 OFFICE O/P EST MOD 30 MIN: CPT | Performed by: OPHTHALMOLOGY

## 2024-05-29 PROCEDURE — 92136 OPHTHALMIC BIOMETRY: CPT | Mod: RT | Performed by: OPHTHALMOLOGY

## 2024-05-29 ASSESSMENT — CONFRONTATIONAL VISUAL FIELD TEST (CVF)
OD_FINDINGS: FULL
OS_FINDINGS: FULL

## 2024-06-06 ENCOUNTER — AMBULATORY SURGERY CENTER (OUTPATIENT)
Dept: URBAN - METROPOLITAN AREA SURGERY 19 | Facility: SURGERY | Age: 69
Setting detail: OPHTHALMOLOGY
End: 2024-06-06
Payer: MEDICARE

## 2024-06-06 DIAGNOSIS — H25.11: ICD-10-CM

## 2024-06-06 PROCEDURE — 66984 XCAPSL CTRC RMVL W/O ECP: CPT | Mod: RT | Performed by: OPHTHALMOLOGY

## 2024-06-07 ENCOUNTER — RX ONLY (RX ONLY)
Age: 69
End: 2024-06-07

## 2024-06-07 ENCOUNTER — OFFICE (OUTPATIENT)
Dept: URBAN - METROPOLITAN AREA CLINIC 109 | Facility: CLINIC | Age: 69
Setting detail: OPHTHALMOLOGY
End: 2024-06-07
Payer: MEDICARE

## 2024-06-07 DIAGNOSIS — Z96.1: ICD-10-CM

## 2024-06-07 PROCEDURE — 99024 POSTOP FOLLOW-UP VISIT: CPT | Performed by: OPHTHALMOLOGY

## 2024-06-14 ENCOUNTER — OFFICE (OUTPATIENT)
Dept: URBAN - METROPOLITAN AREA CLINIC 109 | Facility: CLINIC | Age: 69
Setting detail: OPHTHALMOLOGY
End: 2024-06-14
Payer: MEDICARE

## 2024-06-14 DIAGNOSIS — Z96.1: ICD-10-CM

## 2024-06-14 DIAGNOSIS — H25.12: ICD-10-CM

## 2024-06-14 PROCEDURE — 99024 POSTOP FOLLOW-UP VISIT: CPT | Performed by: OPHTHALMOLOGY

## 2024-06-14 PROCEDURE — 92136 OPHTHALMIC BIOMETRY: CPT | Mod: LT | Performed by: OPHTHALMOLOGY

## 2024-06-14 ASSESSMENT — CONFRONTATIONAL VISUAL FIELD TEST (CVF)
OD_FINDINGS: FULL
OS_FINDINGS: FULL

## 2024-06-20 ENCOUNTER — AMBUL SURGICAL CARE (OUTPATIENT)
Dept: URBAN - METROPOLITAN AREA SURGERY 1 | Facility: SURGERY | Age: 69
Setting detail: OPHTHALMOLOGY
End: 2024-06-20
Payer: MEDICARE

## 2024-06-20 DIAGNOSIS — H25.12: ICD-10-CM

## 2024-06-20 PROCEDURE — 66984 XCAPSL CTRC RMVL W/O ECP: CPT | Mod: 79,LT | Performed by: OPHTHALMOLOGY

## 2024-06-20 NOTE — ED PROVIDER NOTE - CONSTITUTIONAL, MLM
Group Topic:  Group Psychotherapy    Date: 6/20/2024  Start Time:  8:30 AM  End Time: 11:30 AM  Facilitators: King Slaughter LPC    This visit is being performed via video for [facility]. Clinician Location: [facility]  Patient Location: Home.      180 minutes were spent in this encounter.    6/20/2024  Facilitator(s): King Slaughter LPC    Method: Group  Attendance: Present  Participation: Quiet and Attentive  Patient report of any safety concerns: No  Drugs/alcohol reported:No  Mood: Depressed and Anxious  Affect: Appropriate to Content  Thought Process: Rumination  Perceptual Problems: None  Speech: Clear/articulate  Behavior/Socialization: Lethargic  Task Performance: Needed assistance  Patient Response: Attentive, Appropriate feedback, and Demonstrated insight    Group Topic:  Coping Skills Education:    Start Time: 8:30 AM  End Time: 9:25 AM  Number of group participants: 4  Therapeutic intervention(s) introduced to group: Social Anxiety  Patient's response to group: Patient was provided psycho-education related to social anxiety, focusing on strategies for managing social anxiety.     Group Topic:  Process Group:    Start Time: 9:35 AM  End Time: 10:30 AM  Number of group participants: 4  Patient’s response to group and/or progress towards treatment goals: Patient reported that she took her sleeping medication last night and is still feeling groggy. Patient acknowledged that she doesn't typically have this reaction, but that she was up a lot throughout the night. Patient reported that she doesn't remember being up during the night but her mother told her she was. Patient processed this with the group and was receptive to supportive feedback from peers who could relate to the patient's experiences. Patient also talked about being at retirement point of IOP and progress she has made while in the program. Patient acknowledged that she is feeling less over-whelmed and that getting stuff from her ex-fiance's  house was helpful to resolved. Patient also reported that she wants to continue to work on improving mood stability.     Group Topic:  Behavioral Activation Group:    Start Time: 10:40 AM  End Time: 11:30 AM  Number of group participants: 4  Goals identified: catch up on sleep, get through work shift, read after work, shower, avoid over-thinking, practice distraction techniques and mindfuless  Patient’s response to group: Patient established goals for the next 24 hours.        normal... Well appearing, awake, alert, oriented to person, place, time/situation and in no apparent distress.

## 2024-06-21 ENCOUNTER — RX ONLY (RX ONLY)
Age: 69
End: 2024-06-21

## 2024-06-21 ENCOUNTER — OFFICE (OUTPATIENT)
Dept: URBAN - METROPOLITAN AREA CLINIC 109 | Facility: CLINIC | Age: 69
Setting detail: OPHTHALMOLOGY
End: 2024-06-21
Payer: MEDICARE

## 2024-06-21 DIAGNOSIS — Z96.1: ICD-10-CM

## 2024-06-21 PROCEDURE — 99024 POSTOP FOLLOW-UP VISIT: CPT | Performed by: OPHTHALMOLOGY

## 2024-06-21 ASSESSMENT — CONFRONTATIONAL VISUAL FIELD TEST (CVF)
OS_FINDINGS: FULL
OD_FINDINGS: FULL

## 2024-07-15 ENCOUNTER — OFFICE (OUTPATIENT)
Dept: URBAN - METROPOLITAN AREA CLINIC 109 | Facility: CLINIC | Age: 69
Setting detail: OPHTHALMOLOGY
End: 2024-07-15
Payer: MEDICARE

## 2024-07-15 ENCOUNTER — RX ONLY (RX ONLY)
Age: 69
End: 2024-07-15

## 2024-07-15 DIAGNOSIS — H10.9: ICD-10-CM

## 2024-07-15 DIAGNOSIS — Z96.1: ICD-10-CM

## 2024-07-15 PROCEDURE — 99213 OFFICE O/P EST LOW 20 MIN: CPT | Mod: 24 | Performed by: OPHTHALMOLOGY

## 2024-07-15 RX ORDER — KETOROLAC TROMETHAMINE 4 MG/ML
SOLUTION/ DROPS OPHTHALMIC
Qty: 1 | Refills: 3 | Status: ACTIVE | OUTPATIENT

## 2024-07-15 ASSESSMENT — CONFRONTATIONAL VISUAL FIELD TEST (CVF)
OS_FINDINGS: FULL
OD_FINDINGS: FULL

## 2024-10-14 ENCOUNTER — OFFICE (OUTPATIENT)
Dept: URBAN - METROPOLITAN AREA CLINIC 109 | Facility: CLINIC | Age: 69
Setting detail: OPHTHALMOLOGY
End: 2024-10-14
Payer: MEDICARE

## 2024-10-14 DIAGNOSIS — H52.213: ICD-10-CM

## 2024-10-14 DIAGNOSIS — E11.9: ICD-10-CM

## 2024-10-14 DIAGNOSIS — H11.153: ICD-10-CM

## 2024-10-14 PROCEDURE — 92014 COMPRE OPH EXAM EST PT 1/>: CPT | Performed by: OPHTHALMOLOGY

## 2024-10-14 ASSESSMENT — REFRACTION_MANIFEST
OD_VA1: 20/50
OS_AXIS: 080
OD_AXIS: 100
OS_SPHERE: +2.75
OS_VA2: 20/25(J1)
OD_CYLINDER: -2.25
OD_VA1: 20/150
OD_VA2: 20/25(J1)
OU_VA: 20/40
OS_AXIS: 081
OD_VA2: 20/20
OS_SPHERE: -1.25
OS_AXIS: 102
OD_ADD: +3.00
OS_VA1: 20/50
OD_CYLINDER: -1.00
OS_VA1: 20/70
OS_CYLINDER: -1.50
OU_VA: 20/50
OS_SPHERE: -0.50
OS_VA2: 20/20
OD_SPHERE: -1.50
OS_CYLINDER: -0.75
OS_CYLINDER: -0.75
OD_CYLINDER: -1.75
OD_AXIS: 104
OD_AXIS: 105
OD_SPHERE: +3.00
OD_SPHERE: -1.50
OS_ADD: +3.00

## 2024-10-14 ASSESSMENT — REFRACTION_CURRENTRX
OD_OVR_VA: 20/
OD_CYLINDER: -1.75
OD_VPRISM_DIRECTION: SV
OD_SPHERE: -1.50
OS_AXIS: 081
OS_OVR_VA: 20/
OD_AXIS: 103
OS_CYLINDER: -1.50
OS_SPHERE: -0.75
OS_VPRISM_DIRECTION: SV

## 2024-10-14 ASSESSMENT — REFRACTION_AUTOREFRACTION
OS_AXIS: 102
OS_CYLINDER: -1.75
OS_SPHERE: +0.75
OD_AXIS: 104
OD_CYLINDER: -2.25
OD_SPHERE: +1.25

## 2024-10-14 ASSESSMENT — KERATOMETRY
OD_AXISANGLE_DEGREES: 024
OS_K2POWER_DIOPTERS: 47.75
OD_K1POWER_DIOPTERS: 45.25
OD_K2POWER_DIOPTERS: 46.25
OS_AXISANGLE_DEGREES: 140
OS_K1POWER_DIOPTERS: 45.25

## 2024-10-14 ASSESSMENT — VISUAL ACUITY
OD_BCVA: 20/20-1
OS_BCVA: 20/25-2

## 2024-10-14 ASSESSMENT — TONOMETRY
OD_IOP_MMHG: 10
OS_IOP_MMHG: 11

## 2024-10-14 ASSESSMENT — CONFRONTATIONAL VISUAL FIELD TEST (CVF)
OD_FINDINGS: FULL
OS_FINDINGS: FULL

## 2024-10-15 NOTE — ED PROVIDER NOTE - WR INTERPRETATION DATE TIME  3
PROVIDER:[TOKEN:[[id number]],FOLLOWUP:[2 weeks]],PROVIDER:[TOKEN:[[id number]],FOLLOWUP:[2 weeks]] PROVIDER:[TOKEN:[[id number]],FOLLOWUP:[2 weeks]],PROVIDER:[TOKEN:[[id number]],FOLLOWUP:[2 weeks]],PROVIDER:[TOKEN:[[id number]],FOLLOWUP:[1 month]] 27-Feb-2023 09:35

## 2025-03-01 ENCOUNTER — INPATIENT (INPATIENT)
Facility: HOSPITAL | Age: 70
LOS: 2 days | Discharge: HOME | DRG: 192 | End: 2025-03-04
Attending: FAMILY MEDICINE | Admitting: STUDENT IN AN ORGANIZED HEALTH CARE EDUCATION/TRAINING PROGRAM
Payer: MEDICARE

## 2025-03-01 ENCOUNTER — RESULT REVIEW (OUTPATIENT)
Age: 70
End: 2025-03-01

## 2025-03-01 VITALS
TEMPERATURE: 97 F | RESPIRATION RATE: 48 BRPM | SYSTOLIC BLOOD PRESSURE: 103 MMHG | OXYGEN SATURATION: 70 % | WEIGHT: 169.98 LBS | DIASTOLIC BLOOD PRESSURE: 52 MMHG | HEART RATE: 148 BPM | HEIGHT: 69 IN

## 2025-03-01 DIAGNOSIS — J44.1 CHRONIC OBSTRUCTIVE PULMONARY DISEASE WITH (ACUTE) EXACERBATION: ICD-10-CM

## 2025-03-01 DIAGNOSIS — Z98.49 CATARACT EXTRACTION STATUS, UNSPECIFIED EYE: Chronic | ICD-10-CM

## 2025-03-01 PROBLEM — R73.03 PREDIABETES: Chronic | Status: ACTIVE | Noted: 2024-08-10

## 2025-03-01 PROBLEM — J45.909 UNSPECIFIED ASTHMA, UNCOMPLICATED: Chronic | Status: ACTIVE | Noted: 2024-08-10

## 2025-03-01 PROBLEM — E78.5 HYPERLIPIDEMIA, UNSPECIFIED: Chronic | Status: ACTIVE | Noted: 2024-08-10

## 2025-03-01 LAB
ALBUMIN SERPL ELPH-MCNC: 3.2 G/DL — LOW (ref 3.3–5)
ALP SERPL-CCNC: 77 U/L — SIGNIFICANT CHANGE UP (ref 40–120)
ALT FLD-CCNC: 42 U/L — SIGNIFICANT CHANGE UP (ref 12–78)
ANION GAP SERPL CALC-SCNC: 8 MMOL/L — SIGNIFICANT CHANGE UP (ref 5–17)
APTT BLD: 28.1 SEC — SIGNIFICANT CHANGE UP (ref 24.5–35.6)
AST SERPL-CCNC: 28 U/L — SIGNIFICANT CHANGE UP (ref 15–37)
BASE EXCESS BLDA CALC-SCNC: -5.1 MMOL/L — LOW (ref -2–3)
BASOPHILS # BLD AUTO: 0.05 K/UL — SIGNIFICANT CHANGE UP (ref 0–0.2)
BASOPHILS NFR BLD AUTO: 0.3 % — SIGNIFICANT CHANGE UP (ref 0–2)
BILIRUB SERPL-MCNC: 0.8 MG/DL — SIGNIFICANT CHANGE UP (ref 0.2–1.2)
BLOOD GAS COMMENTS ARTERIAL: SIGNIFICANT CHANGE UP
BUN SERPL-MCNC: 18 MG/DL — SIGNIFICANT CHANGE UP (ref 7–23)
CALCIUM SERPL-MCNC: 8.9 MG/DL — SIGNIFICANT CHANGE UP (ref 8.5–10.1)
CHLORIDE SERPL-SCNC: 112 MMOL/L — HIGH (ref 96–108)
CO2 SERPL-SCNC: 22 MMOL/L — SIGNIFICANT CHANGE UP (ref 22–31)
CREAT SERPL-MCNC: 1.1 MG/DL — SIGNIFICANT CHANGE UP (ref 0.5–1.3)
EGFR: 72 ML/MIN/1.73M2 — SIGNIFICANT CHANGE UP
EGFR: 72 ML/MIN/1.73M2 — SIGNIFICANT CHANGE UP
EOSINOPHIL # BLD AUTO: 0.13 K/UL — SIGNIFICANT CHANGE UP (ref 0–0.5)
EOSINOPHIL NFR BLD AUTO: 0.9 % — SIGNIFICANT CHANGE UP (ref 0–6)
FLUAV AG NPH QL: SIGNIFICANT CHANGE UP
FLUBV AG NPH QL: SIGNIFICANT CHANGE UP
GAS PNL BLDA: SIGNIFICANT CHANGE UP
GLUCOSE SERPL-MCNC: 118 MG/DL — HIGH (ref 70–99)
HCO3 BLDA-SCNC: 20 MMOL/L — LOW (ref 21–28)
HCT VFR BLD CALC: 40.1 % — SIGNIFICANT CHANGE UP (ref 39–50)
HGB BLD-MCNC: 13.5 G/DL — SIGNIFICANT CHANGE UP (ref 13–17)
IMM GRANULOCYTES NFR BLD AUTO: 0.9 % — SIGNIFICANT CHANGE UP (ref 0–0.9)
INR BLD: 1.13 RATIO — SIGNIFICANT CHANGE UP (ref 0.85–1.16)
LACTATE SERPL-SCNC: 2 MMOL/L — SIGNIFICANT CHANGE UP (ref 0.7–2)
LYMPHOCYTES # BLD AUTO: 15.2 % — SIGNIFICANT CHANGE UP (ref 13–44)
LYMPHOCYTES # BLD AUTO: 2.22 K/UL — SIGNIFICANT CHANGE UP (ref 1–3.3)
MCHC RBC-ENTMCNC: 29 PG — SIGNIFICANT CHANGE UP (ref 27–34)
MCHC RBC-ENTMCNC: 33.7 G/DL — SIGNIFICANT CHANGE UP (ref 32–36)
MCV RBC AUTO: 86.2 FL — SIGNIFICANT CHANGE UP (ref 80–100)
MONOCYTES # BLD AUTO: 1.46 K/UL — HIGH (ref 0–0.9)
MONOCYTES NFR BLD AUTO: 10 % — SIGNIFICANT CHANGE UP (ref 2–14)
NEUTROPHILS # BLD AUTO: 10.59 K/UL — HIGH (ref 1.8–7.4)
NEUTROPHILS NFR BLD AUTO: 72.7 % — SIGNIFICANT CHANGE UP (ref 43–77)
NRBC BLD AUTO-RTO: 0 /100 WBCS — SIGNIFICANT CHANGE UP (ref 0–0)
PCO2 BLDA: 36 MMHG — SIGNIFICANT CHANGE UP (ref 35–48)
PH BLDA: 7.36 — SIGNIFICANT CHANGE UP (ref 7.35–7.45)
PLATELET # BLD AUTO: 297 K/UL — SIGNIFICANT CHANGE UP (ref 150–400)
PO2 BLDA: 297 MMHG — HIGH (ref 83–108)
POTASSIUM SERPL-MCNC: 4 MMOL/L — SIGNIFICANT CHANGE UP (ref 3.5–5.3)
POTASSIUM SERPL-SCNC: 4 MMOL/L — SIGNIFICANT CHANGE UP (ref 3.5–5.3)
PROT SERPL-MCNC: 7.5 G/DL — SIGNIFICANT CHANGE UP (ref 6–8.3)
PROTHROM AB SERPL-ACNC: 13.3 SEC — SIGNIFICANT CHANGE UP (ref 9.9–13.4)
RBC # BLD: 4.65 M/UL — SIGNIFICANT CHANGE UP (ref 4.2–5.8)
RBC # FLD: 16.6 % — HIGH (ref 10.3–14.5)
RSV RNA NPH QL NAA+NON-PROBE: SIGNIFICANT CHANGE UP
SAO2 % BLDA: 100 % — HIGH (ref 94–98)
SARS-COV-2 RNA SPEC QL NAA+PROBE: SIGNIFICANT CHANGE UP
SODIUM SERPL-SCNC: 142 MMOL/L — SIGNIFICANT CHANGE UP (ref 135–145)
WBC # BLD: 14.28 K/UL — HIGH (ref 3.8–10.5)
WBC # FLD AUTO: 14.28 K/UL — HIGH (ref 3.8–10.5)

## 2025-03-01 PROCEDURE — 99291 CRITICAL CARE FIRST HOUR: CPT | Mod: GC

## 2025-03-01 PROCEDURE — 93010 ELECTROCARDIOGRAM REPORT: CPT

## 2025-03-01 PROCEDURE — 93306 TTE W/DOPPLER COMPLETE: CPT | Mod: 26

## 2025-03-01 PROCEDURE — 71045 X-RAY EXAM CHEST 1 VIEW: CPT | Mod: 26

## 2025-03-01 PROCEDURE — 99291 CRITICAL CARE FIRST HOUR: CPT

## 2025-03-01 RX ORDER — ACETAMINOPHEN 500 MG/5ML
650 LIQUID (ML) ORAL EVERY 6 HOURS
Refills: 0 | Status: DISCONTINUED | OUTPATIENT
Start: 2025-03-01 | End: 2025-03-04

## 2025-03-01 RX ORDER — DEXTROSE 50 % IN WATER 50 %
15 SYRINGE (ML) INTRAVENOUS ONCE
Refills: 0 | Status: DISCONTINUED | OUTPATIENT
Start: 2025-03-01 | End: 2025-03-04

## 2025-03-01 RX ORDER — INSULIN LISPRO 100 U/ML
INJECTION, SOLUTION INTRAVENOUS; SUBCUTANEOUS
Refills: 0 | Status: DISCONTINUED | OUTPATIENT
Start: 2025-03-01 | End: 2025-03-02

## 2025-03-01 RX ORDER — INSULIN LISPRO 100 U/ML
INJECTION, SOLUTION INTRAVENOUS; SUBCUTANEOUS AT BEDTIME
Refills: 0 | Status: DISCONTINUED | OUTPATIENT
Start: 2025-03-01 | End: 2025-03-02

## 2025-03-01 RX ORDER — GLUCAGON 3 MG/1
1 POWDER NASAL ONCE
Refills: 0 | Status: DISCONTINUED | OUTPATIENT
Start: 2025-03-01 | End: 2025-03-04

## 2025-03-01 RX ORDER — AZITHROMYCIN 250 MG
500 CAPSULE ORAL DAILY
Refills: 0 | Status: DISCONTINUED | OUTPATIENT
Start: 2025-03-01 | End: 2025-03-02

## 2025-03-01 RX ORDER — DEXMEDETOMIDINE HYDROCHLORIDE IN SODIUM CHLORIDE 4 UG/ML
0.4 INJECTION INTRAVENOUS
Qty: 400 | Refills: 0 | Status: DISCONTINUED | OUTPATIENT
Start: 2025-03-01 | End: 2025-03-03

## 2025-03-01 RX ORDER — MAGNESIUM SULFATE 500 MG/ML
2 SYRINGE (ML) INJECTION ONCE
Refills: 0 | Status: COMPLETED | OUTPATIENT
Start: 2025-03-01 | End: 2025-03-01

## 2025-03-01 RX ORDER — SODIUM CHLORIDE 9 G/1000ML
1000 INJECTION, SOLUTION INTRAVENOUS
Refills: 0 | Status: DISCONTINUED | OUTPATIENT
Start: 2025-03-01 | End: 2025-03-04

## 2025-03-01 RX ORDER — DEXTROSE 50 % IN WATER 50 %
25 SYRINGE (ML) INTRAVENOUS ONCE
Refills: 0 | Status: DISCONTINUED | OUTPATIENT
Start: 2025-03-01 | End: 2025-03-04

## 2025-03-01 RX ORDER — METHYLPREDNISOLONE ACETATE 80 MG/ML
125 INJECTION, SUSPENSION INTRA-ARTICULAR; INTRALESIONAL; INTRAMUSCULAR; SOFT TISSUE ONCE
Refills: 0 | Status: COMPLETED | OUTPATIENT
Start: 2025-03-01 | End: 2025-03-01

## 2025-03-01 RX ORDER — DEXTROSE 50 % IN WATER 50 %
12.5 SYRINGE (ML) INTRAVENOUS ONCE
Refills: 0 | Status: DISCONTINUED | OUTPATIENT
Start: 2025-03-01 | End: 2025-03-04

## 2025-03-01 RX ORDER — IPRATROPIUM BROMIDE AND ALBUTEROL SULFATE .5; 2.5 MG/3ML; MG/3ML
3 SOLUTION RESPIRATORY (INHALATION) EVERY 6 HOURS
Refills: 0 | Status: DISCONTINUED | OUTPATIENT
Start: 2025-03-01 | End: 2025-03-04

## 2025-03-01 RX ORDER — IPRATROPIUM BROMIDE AND ALBUTEROL SULFATE .5; 2.5 MG/3ML; MG/3ML
3 SOLUTION RESPIRATORY (INHALATION)
Refills: 0 | Status: COMPLETED | OUTPATIENT
Start: 2025-03-01 | End: 2025-03-01

## 2025-03-01 RX ORDER — ALBUTEROL SULFATE 2.5 MG/3ML
2 VIAL, NEBULIZER (ML) INHALATION
Refills: 0 | Status: DISCONTINUED | OUTPATIENT
Start: 2025-03-01 | End: 2025-03-04

## 2025-03-01 RX ORDER — CEFTRIAXONE 500 MG/1
1000 INJECTION, POWDER, FOR SOLUTION INTRAMUSCULAR; INTRAVENOUS EVERY 24 HOURS
Refills: 0 | Status: DISCONTINUED | OUTPATIENT
Start: 2025-03-01 | End: 2025-03-04

## 2025-03-01 RX ORDER — BUDESONIDE 0.25 MG/2ML
0.5 SUSPENSION RESPIRATORY (INHALATION) EVERY 12 HOURS
Refills: 0 | Status: DISCONTINUED | OUTPATIENT
Start: 2025-03-01 | End: 2025-03-04

## 2025-03-01 RX ORDER — METHYLPREDNISOLONE ACETATE 80 MG/ML
40 INJECTION, SUSPENSION INTRA-ARTICULAR; INTRALESIONAL; INTRAMUSCULAR; SOFT TISSUE EVERY 8 HOURS
Refills: 0 | Status: DISCONTINUED | OUTPATIENT
Start: 2025-03-01 | End: 2025-03-03

## 2025-03-01 RX ADMIN — DEXMEDETOMIDINE HYDROCHLORIDE IN SODIUM CHLORIDE 7.71 MICROGRAM(S)/KG/HR: 4 INJECTION INTRAVENOUS at 10:14

## 2025-03-01 RX ADMIN — METHYLPREDNISOLONE ACETATE 40 MILLIGRAM(S): 80 INJECTION, SUSPENSION INTRA-ARTICULAR; INTRALESIONAL; INTRAMUSCULAR; SOFT TISSUE at 13:00

## 2025-03-01 RX ADMIN — INSULIN LISPRO 2: 100 INJECTION, SOLUTION INTRAVENOUS; SUBCUTANEOUS at 17:35

## 2025-03-01 RX ADMIN — METHYLPREDNISOLONE ACETATE 125 MILLIGRAM(S): 80 INJECTION, SUSPENSION INTRA-ARTICULAR; INTRALESIONAL; INTRAMUSCULAR; SOFT TISSUE at 07:44

## 2025-03-01 RX ADMIN — Medication 40 MILLIGRAM(S): at 12:56

## 2025-03-01 RX ADMIN — IPRATROPIUM BROMIDE AND ALBUTEROL SULFATE 3 MILLILITER(S): .5; 2.5 SOLUTION RESPIRATORY (INHALATION) at 07:47

## 2025-03-01 RX ADMIN — DEXMEDETOMIDINE HYDROCHLORIDE IN SODIUM CHLORIDE 7.71 MICROGRAM(S)/KG/HR: 4 INJECTION INTRAVENOUS at 15:00

## 2025-03-01 RX ADMIN — Medication 2 GRAM(S): at 08:07

## 2025-03-01 RX ADMIN — Medication 150 GRAM(S): at 07:46

## 2025-03-01 RX ADMIN — IPRATROPIUM BROMIDE AND ALBUTEROL SULFATE 3 MILLILITER(S): .5; 2.5 SOLUTION RESPIRATORY (INHALATION) at 14:09

## 2025-03-01 RX ADMIN — IPRATROPIUM BROMIDE AND ALBUTEROL SULFATE 3 MILLILITER(S): .5; 2.5 SOLUTION RESPIRATORY (INHALATION) at 07:45

## 2025-03-01 RX ADMIN — BUDESONIDE 0.5 MILLIGRAM(S): 0.25 SUSPENSION RESPIRATORY (INHALATION) at 19:55

## 2025-03-01 RX ADMIN — CEFTRIAXONE 100 MILLIGRAM(S): 500 INJECTION, POWDER, FOR SOLUTION INTRAMUSCULAR; INTRAVENOUS at 12:56

## 2025-03-01 RX ADMIN — METHYLPREDNISOLONE ACETATE 40 MILLIGRAM(S): 80 INJECTION, SUSPENSION INTRA-ARTICULAR; INTRALESIONAL; INTRAMUSCULAR; SOFT TISSUE at 22:52

## 2025-03-01 RX ADMIN — IPRATROPIUM BROMIDE AND ALBUTEROL SULFATE 3 MILLILITER(S): .5; 2.5 SOLUTION RESPIRATORY (INHALATION) at 19:55

## 2025-03-01 RX ADMIN — Medication 250 MILLIGRAM(S): at 14:47

## 2025-03-01 RX ADMIN — IPRATROPIUM BROMIDE AND ALBUTEROL SULFATE 3 MILLILITER(S): .5; 2.5 SOLUTION RESPIRATORY (INHALATION) at 07:52

## 2025-03-01 NOTE — H&P ADULT - TIME BILLING
Reviewing chart notes and data, reviewing telemetry monitor records, face to face time counseling the patient, coordinating care with ICU team

## 2025-03-01 NOTE — PROGRESS NOTE ADULT - ASSESSMENT
70 y/o Male with PMH of COPD (On 3L Home O2), HLD and DM presents to  ED complaining of shortness of breath with:    1. Acute Hypoxic Respiratory Failure   2. Acute COPD Exacerbation       Neuro: Mildly sedated on Precedex gtt, titrate to RASS 0. Avoid delirogenic medications.   Resp:  CV: Hemodynamically stable. TTE with EF 59% with no RWA. Monitor clinical and laboratory end points of perfusion, maintain MAP >65.   GI: NPO while on NIPPV. Likely can start on Consistent carb diet when transitioned off sedation/ NIPPV. GI prophylaxis with Protonix.   Renal: Stable renal indices. Trend labs, replete lytes as needed to maintain K>3, Mg>2 and Phos>2. Avoid nephrotoxic medications.  68 y/o Male with PMH of COPD (On 3L Home O2), HLD and DM presents to  ED complaining of shortness of breath with:    1. Acute Hypoxic Respiratory Failure   2. Acute COPD Exacerbation       Neuro: Mildly sedated on Precedex gtt, titrate to RASS 0. Avoid delirogenic medications.   Resp: Profound tachypnea and hypoxia on arrival. Initiated on NIPPV with mild improvement. However with profound accessory muscle use and multiple desaturation events. CTA ordered to r/o PE in setting of poor A:A gradient. C/W bronchodilators and inhaled steroids. Actively titrating FiO2 as tolerated. Encourage IS/OOB. High risk for rapid decompensation requiring emergent intubation.   CV: Hemodynamically stable. TTE with EF 59% with no RWA. Monitor clinical and laboratory end points of perfusion, maintain MAP >65.   GI: NPO while on NIPPV. Likely can start on Consistent carb diet when transitioned off sedation/ NIPPV. GI prophylaxis with Protonix.   Renal: Stable renal indices. Trend labs, replete lytes as needed to maintain K>3, Mg>2 and Phos>2. Avoid nephrotoxic medications.   ID: Leukocytosis, afebrile. RVP-. Legionella pending. Empiric coverage with CAP with Azithromycin and Ceftriaxone. Trend WBC and fever curve.   Heme: Trend H/H, transfuse for hgb <7.0 if necessary. Trend H/H, transfuse for hgb <7.0 if necessary. DVT prophylaxis with Lovenox. SCDs in place.

## 2025-03-01 NOTE — ED PROVIDER NOTE - NSICDXPASTMEDICALHX_GEN_ALL_CORE_FT
PAST MEDICAL HISTORY:  Asthma     COPD, severe     HLD (hyperlipidemia)     Prediabetes      Statement Selected

## 2025-03-01 NOTE — PATIENT PROFILE ADULT - STATED REASON FOR ADMISSION
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 O2 tank ran out and patient continued to work for about an hour, desaturated. Pt has his own pulse oximeter on him and checked himself to see that he was as he saturating in the 80's, and his boss brought him to the ED. Pt stated he could barely catch a breath.

## 2025-03-01 NOTE — ED ADULT NURSE NOTE - CAS TRG GEN SKIN COLOR
[Pathological] : TNM Stage: p [FreeTextEntry4] : uterine cancer Normal for race [TTNM] : 3b [NTNM] : 1 [MTNM] : 0 [IIIC] : IIIC

## 2025-03-01 NOTE — PROGRESS NOTE ADULT - SUBJECTIVE AND OBJECTIVE BOX
Patient is a 69y old  Male who presents with a chief complaint of Acute hypoxic respiratory failure, COPD exacerbation (01 Mar 2025 10:46)      BRIEF HOSPITAL COURSE-  68 y/o Male with PMH of COPD (On 3L Home O2), HLD and DM presents to  ED complaining of shortness of breath. Admitted to ICU for acute hypoxic respiratory failure secondary to acute COPD exacerbation.    Events last 24 hours:   Remains on Precedex gtt. Transitioned of NIPPV; however with persistent tachypnea and multiple desaturation events.       Review of Systems:  CONSTITUTIONAL: No fever, chills, or fatigue.  EYES: No eye pain, visual disturbances, or discharge.  ENMT: No difficulty hearing, tinnitus, vertigo. No sinus or throat pain.  NECK: No pain or stiffness.  RESPIRATORY: +Shortness of breath. No cough, wheezing, chills or hemoptysis.   CARDIOVASCULAR: No chest pain, palpitations, dizziness, or leg swelling.  GASTROINTESTINAL: No abdominal or epigastric pain. No nausea, vomiting, or hematemesis. No diarrhea or constipation. No melena or hematochezia.  GENITOURINARY: No dysuria, frequency, hematuria, or incontinence.  NEUROLOGICAL: No headaches, memory loss, loss of strength, numbness, or tremors.  SKIN: No itching, burning, rashes, or lesions.  MUSCULOSKELETAL: No joint pain or swelling. No muscle, back, or extremity pain.  PSYCHIATRIC: No depression, anxiety, mood swings, or difficulty sleeping.      PAST MEDICAL & SURGICAL HISTORY-  COPD, severe      Asthma      HLD (hyperlipidemia)      Prediabetes      S/P cataract surgery  R eye 6/6/24, L eye 6/20          Medications:  azithromycin  IVPB 500 milliGRAM(s) IV Intermittent daily  cefTRIAXone   IVPB 1000 milliGRAM(s) IV Intermittent every 24 hours    albuterol    90 MICROgram(s) HFA Inhaler 2 Puff(s) Inhalation every 2 hours PRN  albuterol/ipratropium for Nebulization 3 milliLiter(s) Nebulizer every 6 hours  buDESOnide    Inhalation Suspension 0.5 milliGRAM(s) Inhalation every 12 hours    acetaminophen     Tablet .. 650 milliGRAM(s) Oral every 6 hours PRN  dexMEDEtomidine Infusion 0.4 MICROgram(s)/kG/Hr IV Continuous <Continuous>    pantoprazole  Injectable 40 milliGRAM(s) IV Push daily    dextrose 50% Injectable 25 Gram(s) IV Push once  dextrose 50% Injectable 12.5 Gram(s) IV Push once  dextrose 50% Injectable 25 Gram(s) IV Push once  dextrose Oral Gel 15 Gram(s) Oral once PRN  glucagon  Injectable 1 milliGRAM(s) IntraMuscular once  insulin lispro (ADMELOG) corrective regimen sliding scale   SubCutaneous three times a day before meals  insulin lispro (ADMELOG) corrective regimen sliding scale   SubCutaneous at bedtime  methylPREDNISolone sodium succinate Injectable 40 milliGRAM(s) IV Push every 8 hours    dextrose 5%. 1000 milliLiter(s) IV Continuous <Continuous>  dextrose 5%. 1000 milliLiter(s) IV Continuous <Continuous>    chlorhexidine 2% Cloths 1 Application(s) Topical <User Schedule>        ICU Vital Signs Last 24 Hrs  T(C): 36.8 (01 Mar 2025 23:39), Max: 37.3 (01 Mar 2025 12:06)  T(F): 98.2 (01 Mar 2025 23:39), Max: 99.1 (01 Mar 2025 12:06)  HR: 86 (02 Mar 2025 01:00) (67 - 148)  BP: 132/81 (02 Mar 2025 01:00) (94/62 - 178/111)  BP(mean): 90 (02 Mar 2025 01:00) (66 - 91)  ABP: --  ABP(mean): --  RR: 28 (02 Mar 2025 01:00) (15 - 48)  SpO2: 96% (02 Mar 2025 01:00) (70% - 100%)    O2 Parameters below as of 02 Mar 2025 00:00  Patient On (Oxygen Delivery Method): nasal cannula  O2 Flow (L/min): 3        ABG - ( 01 Mar 2025 10:02 )  pH, Arterial: 7.36  pH, Blood: x     /  pCO2: 36    /  pO2: 297   / HCO3: 20    / Base Excess: -5.1  /  SaO2: 100.0         I&O's Detail    01 Mar 2025 07:01  -  02 Mar 2025 01:57  --------------------------------------------------------  IN:    Dexmedetomidine: 103.1 mL    IV PiggyBack: 50 mL    IV PiggyBack: 250 mL    Oral Fluid: 200 mL  Total IN: 603.1 mL    OUT:    Voided (mL): 350 mL  Total OUT: 350 mL    Total NET: 253.1 mL          LABS:                        13.5   14.28 )-----------( 297      ( 01 Mar 2025 07:45 )             40.1     142  |  112[H]  |  18  ----------------------------<  118[H]  4.0   |  22  |  1.10    Ca    8.9      01 Mar 2025 07:45    TPro  7.5  /  Alb  3.2[L]  /  TBili  0.8  /  DBili  x   /  AST  28  /  ALT  42  /  AlkPhos  77  03-01      CAPILLARY BLOOD GLUCOSE  POCT Blood Glucose.: 164 mg/dL (01 Mar 2025 21:43)      PT/INR - ( 01 Mar 2025 07:45 )   PT: 13.3 sec;   INR: 1.13 ratio    PTT - ( 01 Mar 2025 07:45 )  PTT:28.1 sec      Urinalysis Basic - ( 01 Mar 2025 07:45 )  Color: x / Appearance: x / SG: x / pH: x  Gluc: 118 mg/dL / Ketone: x  / Bili: x / Urobili: x   Blood: x / Protein: x / Nitrite: x   Leuk Esterase: x / RBC: x / WBC x   Sq Epi: x / Non Sq Epi: x / Bacteria: x        CULTURES:        Physical Examination:  General: Elderly male, chronically ill appearing. In moderate distress.  HEENT: Pupils equal, reactive to light.  Symmetric.  PULM: +Clubbing. Course/ diminished to auscultation bilaterally. Tachypneic, with moderate accessory muscle use. No significant sputum production.  NECK: Supple, no lymphadenopathy, trachea midline.  CVS: Regular rate and rhythm. No murmurs, rubs, or gallops. S1, S2 intact.   ABD: Soft, distended, nontender, normoactive bowel sounds. No masses palpable.   EXT: No edema. Nontender.  SKIN: Warm and well perfused, no rashes noted.  NEURO: Alert and oriented. Answering questions. Nonfocal.   DEVICES:       RADIOLOGY-        CRITICAL CARE TIME SPENT: 45 minutes assessing presenting problems of acute illness, which pose high probability of life threatening deterioration or end organ damage/dysfunction, as well as medical decision making including initiating plan of care, reviewing data, reviewing radiologic exams, discussing with multidisciplinary team,  discussing goals of care with patient/family, and writing this note.  Non-inclusive of procedures performed,      DATE OF ENTRY OF THIS NOTE IS EQUAL TO THE DATE OF SERVICES RENDERED

## 2025-03-01 NOTE — ED ADULT NURSE NOTE - NSFALLHARMRISKINTERV_ED_ALL_ED

## 2025-03-01 NOTE — ED PROVIDER NOTE - PHYSICAL EXAMINATION
Vital signs as available reviewed.  General:  No acute distress.  Head:  Normocephalic, atraumatic.  Eyes:  Conjunctiva pink, no icterus.  Cardiovascular:  tachycardia  Respiratory:  slight wheeze with decreased air movement.  Abdomen:  Soft, non-tender.  Musculoskeletal:  No obvious deformity.  Neurologic: Alert and oriented, moving all extremities.  Skin:  Warm and dry.

## 2025-03-01 NOTE — CONSULT NOTE ADULT - ATTENDING COMMENTS
68 yo man with Hx advanced COPD/asthma, chronic hypoxemic respiratory failure, preDM, recent COPD exacerbation and still on prednisone taper now presents with acute hypoxemic respiratory failure from likely COPD exacerbation.  He was in extremis in the ED but refused intubation, and fortunately with precedex for anxiolysis and continuation of BiPAP has now made marked progress.      --continue precedex for anxiolysis  --hemodynamically stable, check echo  --acute hypoxemic respiratory failure, COPD exacerbation, was still on PO prednisone taper  solumedrol 40 q8h  standing bronchodilators and inhaled steroids  CTA to eval for PE when able to lay flat, given marked A-a gradient on admission  --normal renal function  --NPO, PPI  --empiric CTX and azithro pending Cx data  --check fs, ISS      Pt improved with precedex gtt, decreasing rr to 30s and 20s with adequate TV  this afternoon/evening able to be weaned to NC  still remains dyspneic with eating, keep NPO  plan for nocturnal BiPAP

## 2025-03-01 NOTE — H&P ADULT - NSHPPHYSICALEXAM_GEN_ALL_CORE
T(C): 36.9 (03-01-25 @ 16:24), Max: 37.3 (03-01-25 @ 12:06)  HR: 69 (03-01-25 @ 19:00) (69 - 148)  BP: 112/81 (03-01-25 @ 19:00) (94/62 - 178/111)  RR: 18 (03-01-25 @ 19:00) (15 - 48)  SpO2: 97% (03-01-25 @ 19:00) (70% - 100%)    GENERAL: patient in acute respiratory distress  EYES: sclera clear, no exudates  ENMT: oropharynx clear without erythema, no exudates, moist mucous membranes  NECK: supple, soft  LUNGS: tachypneic, accessory muscle usage  HEART: tachycardia, no murmurs noted, no lower extremity edema  GASTROINTESTINAL: abdomen is soft, nontender, nondistended, normoactive bowel sounds  INTEGUMENT: warm, well-perfused, good skin turgor  MUSCULOSKELETAL: no clubbing or cyanosis, no obvious deformity  NEUROLOGIC: awake, alert, oriented x3, good muscle tone in 4 extremities, no obvious sensory deficits  PSYCHIATRIC: A&Ox4

## 2025-03-01 NOTE — H&P ADULT - ASSESSMENT
70 yo M with PMH of COPD/asthma (on 3L home O2), HLD and prediabetes presented to the ED with worsening shortness of breath, admitted to the ICU for acute hypoxic respiratory failure 2/2 COPD exacerbation.    #Acute hypoxic respiratory failure 2/2 COPD exacerbation  #Asthma  - Admit to ICU  - Continue BiPAP  - Patient has declined intubation at this time; trial of Precedex with BiPAP per ICU  - Continue IV solumedrol  - Continue Rocephin and Azithro  - Continue Duonebs, Budesonide nebs  - Follow up full RVP, Strep and Legionella antigens    #Prediabetes  - Hold home metformin  - LDISS, accuchecks Q6h, hypoglycemia protocol  - Follow up A1c    #HLD 68 yo M with PMH of COPD/asthma (on 3L home O2), HLD and prediabetes presented to the ED with worsening shortness of breath, admitted to the ICU for acute hypoxic respiratory failure 2/2 COPD exacerbation.    #Acute hypoxic respiratory failure 2/2 COPD exacerbation  #Asthma  - Admit to ICU  - Continue BiPAP  - Patient has declined intubation at this time; trial of Precedex with BiPAP per ICU  - Continue IV solumedrol  - Continue Rocephin and Azithro  - Continue Duonebs, Budesonide nebs  - Follow up full RVP, Strep and Legionella antigens  - Rest of management per ICU    #Prediabetes  - Hold home metformin  - LDISS, accuchecks Q6h, hypoglycemia protocol  - Follow up A1c    #VTE ppx  - Per ICU

## 2025-03-01 NOTE — ED ADULT NURSE NOTE - FINAL NURSING ELECTRONIC SIGNATURE
"              After Visit Summary   7/12/2017    Elva Sotomayor    MRN: [id number]           Patient Information     Date Of Birth          2002        Visit Information        Provider Department      7/12/2017 4:00 PM Keven Bergman MD [facility]        Today's Diagnoses     Idiopathic mast cell activation syndrome (H)    -  1       Follow-ups after your visit        Follow-up notes from your care team     Return if symptoms worsen or fail to improve.      Who to contact     If you have questions or need follow up information about today's clinic visit or your schedule please contact [facility] directly at [phone].  Normal or non-critical lab and imaging results will be communicated to you by Exohart, letter or phone within 4 business days after the clinic has received the results. If you do not hear from us within 7 days, please contact the clinic through Exohart or phone. If you have a critical or abnormal lab result, we will notify you by phone as soon as possible.  Submit refill requests through KeepIdeas or call your pharmacy and they will forward the refill request to us. Please allow 3 business days for your refill to be completed.          Additional Information About Your Visit        MyChart Information     KeepIdeas lets you send messages to your doctor, view your test results, renew your prescriptions, schedule appointments and more. To sign up, go to [email or web address], contact your [place] clinic or call [phone] during business hours.            Care EveryWhere ID     This is your Care EveryWhere ID. This could be used by other organizations to access your [facility] medical records  Opted out of Care Everywhere exchange        Your Vitals Were     Pulse Temperature Height Pulse Oximetry BMI (Body Mass Index)       124 98.3  F (36.8  C) 1.575 m (5' 2\") 97% 29.81 kg/m2        Blood Pressure from Last 3 Encounters:   07/12/17 123/84   07/12/16 " 123/84    Weight from Last 3 Encounters:   07/12/17 73.9 kg (163 lb) (92 %)*   07/12/16 74.3 kg (163 lb 11.2 oz) (93 %)*     * Growth percentiles are based on [facility] 2-20 Years data.              Today, you had the following     No orders found for display       Primary Care Provider    Unknown Primary MD Monet       No address on file        Equal Access to Services     [facility]: Hadii aad ku hadasho Soomaali, waaxda luqadaha, qaybta kaalmada adeegyada, waxay idiin hayaan adewanda bonner lastewartmichelle . So [facility] [phone].    ATENCIÓN: Si habla español, tiene a lopez disposición servicios gratuitos de asistencia lingüística. Llame al [phone].    We comply with applicable federal civil rights laws and [place] laws. We do not discriminate on the basis of race, color, national origin, age, disability sex, sexual orientation or gender identity.            Thank you!     Thank you for choosing [facility]  for your care. Our goal is always to provide you with excellent care. Hearing back from our patients is one way we can continue to improve our services. Please take a few minutes to complete the written survey that you may receive in the mail after your visit with us. Thank you!             Your Updated Medication List - Protect others around you: Learn how to safely use, store and throw away your medicines at www.disposemymeds.org.          This list is accurate as of: 7/12/17 11:59 PM.  Always use your most recent med list.                   Brand Name Dispense Instructions for use Diagnosis    AMITRIPTYLINE HCL PO      Take 10 mg by mouth At Bedtime        BENADRYL PO      Inject 50 mg into the vein daily 50-75mg once to twice a day        butalbital-acetaminophen-caffeine -40 MG per tablet    FIORICET/ESGIC     Take 1 tablet by mouth every 4 hours as needed        cetirizine 10 MG tablet    zyrTEC    120 tablet    Take 2 tablets (20 mg) by mouth 2 times daily        DICYCLOMINE HCL PO       Take 10 mg by mouth daily One to two tablets 4 times a day as needed        meclizine 25 MG Chew     90 tablet    25 mg qam and 50 mg qhs        montelukast 10 MG tablet    SINGULAIR     Take 10 mg by mouth At Bedtime        * PEPCID PO      Take 20 mg by mouth 2 times daily        * Famotidine 20 MG/2ML Soln     8 vial    Inject 20 mg into the vein every 12 hours    Mast cell disease, systemic (H)       PHENERGAN RE      Inject 50 mg into the vein daily        PROTONIX PO      Take 40 mg by mouth At Bedtime        TYLENOL 500 MG tablet   Generic drug:  acetaminophen     60 tablet    1000 mg once to twice a day        VALIUM PO      Take 5 mg by mouth Two tablets as needed        ZOFRAN PO      Inject 4 mg into the vein 2 times daily        * Notice:  This list has 2 medication(s) that are the same as other medications prescribed for you. Read the directions carefully, and ask your doctor or other care provider to review them with you.       01-Mar-2025 10:33

## 2025-03-01 NOTE — ED PROVIDER NOTE - PROGRESS NOTE DETAILS
case discussed with ICU attending, recommending intubation. patient refusing. ICU accepting. hospitalist made aware.

## 2025-03-01 NOTE — ED PROVIDER NOTE - CLINICAL SUMMARY MEDICAL DECISION MAKING FREE TEXT BOX
Here with severe shortness of breath, tachypnea in setting of COPD. differential diagnosis inclusive of PTX, COPD, PNA. patient placed on Bipap emergently. Check labs, treat COPD, XR, re-eval.

## 2025-03-01 NOTE — CONSULT NOTE ADULT - SUBJECTIVE AND OBJECTIVE BOX
Patient is a 69y old  Male who presents with a chief complaint of   HPI: 68 y/o M PMHx, COPD/Asthma on 3L Home O2, HLD, and Pre-DM complaining with worsening shortness of breath. Patient states he was at [facility] 3 weeks ago with similar symptoms. He was started on PO steroids at that time to which he initially improved but has steadily worsened. He has also been using home O2, PO diuretics and nebulizer treatments with minimal relief. Pt states he can hardly tie his shoe, eat food or walk a few feet without being short of breath. Today, his symptoms acutely worsened more, and became extremely diaphoretic which prompted his visit. He is weak with little appetite and feels warm. He feels his SOB has improved since being in the ER. Of note, he has a ~150 pack year history, and quit smoking in his 50s. Due to his significant work of breathing, Dr. Gibbs was called to the bedside. Patient understands the risks of his significant work of breathing, but denies intubation at this time. He understands that intubation is the safest choice at this time and this can be a life or death decision. However, he is AAOX4, and understands the risks of denying intubation at this time. His HCP is his friend but he doesn't want anyone to be updated at this time. He denies CP, palpitations, nausea, vomiting, diarrhea, fever, chills, and sick contacts.    Allergies: No Known Allergies    PAST MEDICAL & SURGICAL HISTORY:  COPD, severe      Asthma      HLD (hyperlipidemia)      Prediabetes      S/P cataract surgery  R eye 6/6/24, L eye 6/20        FAMILY HISTORY:    SOCIAL HISTORY:    Home Medications:    Review of Systems:  Constitutional: +Fatigue. +Decreased PO intake. No fever or chills.  Neuro: No headache, numbness, weakness  Resp: +Cough, +Wheezing, +SOB.  CVS: No chest pain, palpitations, leg swelling  GI: No abdominal pain, nausea, vomiting, diarrhea   : No dysuria, frequency, incontinence  Skin: No itching, burning, rashes, or lesions   Msk: No joint pain or swelling  Psych: No depression, anxiety, mood swings  T(F): 97 (03-01-25 @ 07:33), Max: 97 (03-01-25 @ 07:33)  HR: 131 (03-01-25 @ 09:35) (119 - 148)  BP: 116/81 (03-01-25 @ 09:35) (103/52 - 164/130)  RR: 28 (03-01-25 @ 09:35) (17 - 48)  SpO2: 97% (03-01-25 @ 09:35)  Wt(kg): --    CAPILLARY BLOOD GLUCOSE        I&O's Summary      Physical Exam:     Gen: in acute distress 2/2 increased WOB, +on BiPAP.  Neuro: AAOX4  HEENT: NC/AT, EOMI.  CVS: +Tachycardia. Distant heart sounds.  Resp: +Tachypneic. +Accessory m. use noted +Desaturating to 70s/80s while speaking.  Abd: +Distended. Non-TTP. Bowel sounds present  Ext: No clubbing edema or cyanosis  Skin: WWP    Meds:             dexMEDEtomidine Infusion 0.4 MICROgram(s)/kG/Hr IV Continuous <Continuous>                                                   13.5   14.28 )-----------( 297      ( 01 Mar 2025 07:45 )             40.1       03-01    142  |  112[H]  |  18  ----------------------------<  118[H]  4.0   |  22  |  1.10    Ca    8.9      01 Mar 2025 07:45    TPro  7.5  /  Alb  3.2[L]  /  TBili  0.8  /  DBili  x   /  AST  28  /  ALT  42  /  AlkPhos  77  03-01          PT/INR - ( 01 Mar 2025 07:45 )   PT: 13.3 sec;   INR: 1.13 ratio         PTT - ( 01 Mar 2025 07:45 )  PTT:28.1 sec  Urinalysis Basic - ( 01 Mar 2025 07:45 )    Color: x / Appearance: x / SG: x / pH: x  Gluc: 118 mg/dL / Ketone: x  / Bili: x / Urobili: x   Blood: x / Protein: x / Nitrite: x   Leuk Esterase: x / RBC: x / WBC x   Sq Epi: x / Non Sq Epi: x / Bacteria: x              Radiology: Cxray: Grossly clear lungs on wet read.    Bedside lung ultrasound:    Bedside ECHO:    CODE STATUS: FULL  GOC discussion: Y  Critical care time spent (mins): *** Patient is a 69y old  Male who presents with a chief complaint of   HPI: 70 y/o M PMHx, COPD/Asthma on 3L Home O2, HLD, and Pre-DM complaining with worsening shortness of breath. Patient states he was at [facility] 3 weeks ago with similar symptoms. He was started on PO steroids at that time to which he initially improved but has steadily worsened. He has also been using home O2, PO diuretics and nebulizer treatments with minimal relief. Pt states he can hardly tie his shoe, eat food or walk a few feet without being short of breath. Today, his symptoms acutely worsened more, and became extremely diaphoretic which prompted his visit. He is weak with little appetite and feels warm. He feels his SOB has improved since being in the ER. Of note, he has a ~150 pack year history, and quit smoking in his 50s. Due to his significant work of breathing, Dr. Gibbs was called to the bedside. Patient understands the risks of his significant work of breathing, but denies intubation at this time. He understands that intubation is the safest choice at this time and this can be a life or death decision. However, he is AAOX4, and understands the risks of denying intubation at this time. His HCP is his friend but he doesn't want anyone to be updated at this time. He denies CP, palpitations, nausea, vomiting, diarrhea, fever, chills, and sick contacts.    Allergies: No Known Allergies    PAST MEDICAL & SURGICAL HISTORY:  COPD, severe      Asthma      HLD (hyperlipidemia)      Prediabetes      S/P cataract surgery  R eye 6/6/24, L eye 6/20        FAMILY HISTORY:    SOCIAL HISTORY:    Home Medications:    Review of Systems:  Constitutional: +Fatigue. +Decreased PO intake. No fever or chills.  Neuro: No headache, numbness, weakness  Resp: +Cough, +Wheezing, +SOB.  CVS: No chest pain, palpitations, leg swelling  GI: No abdominal pain, nausea, vomiting, diarrhea   : No dysuria, frequency, incontinence  Skin: No itching, burning, rashes, or lesions   Msk: No joint pain or swelling  Psych: No depression, anxiety, mood swings  T(F): 97 (03-01-25 @ 07:33), Max: 97 (03-01-25 @ 07:33)  HR: 131 (03-01-25 @ 09:35) (119 - 148)  BP: 116/81 (03-01-25 @ 09:35) (103/52 - 164/130)  RR: 28 (03-01-25 @ 09:35) (17 - 48)  SpO2: 97% (03-01-25 @ 09:35)  Wt(kg): --    CAPILLARY BLOOD GLUCOSE        I&O's Summary      Physical Exam:     Gen: +tripoding, in acute distress 2/2 increased WOB, +on BiPAP.  Neuro: AAOX4  HEENT: NC/AT, EOMI.  CVS: +Tachycardia. Distant heart sounds.  Resp: +Tachypneic. +Accessory m. use noted +Desaturating to 70s/80s while speaking.  Abd: +Distended. Non-TTP. Bowel sounds present  Ext: No clubbing edema or cyanosis  Skin: WWP    Meds:             dexMEDEtomidine Infusion 0.4 MICROgram(s)/kG/Hr IV Continuous <Continuous>                                                   13.5   14.28 )-----------( 297      ( 01 Mar 2025 07:45 )             40.1       03-01    142  |  112[H]  |  18  ----------------------------<  118[H]  4.0   |  22  |  1.10    Ca    8.9      01 Mar 2025 07:45    TPro  7.5  /  Alb  3.2[L]  /  TBili  0.8  /  DBili  x   /  AST  28  /  ALT  42  /  AlkPhos  77  03-01          PT/INR - ( 01 Mar 2025 07:45 )   PT: 13.3 sec;   INR: 1.13 ratio         PTT - ( 01 Mar 2025 07:45 )  PTT:28.1 sec  Urinalysis Basic - ( 01 Mar 2025 07:45 )    Color: x / Appearance: x / SG: x / pH: x  Gluc: 118 mg/dL / Ketone: x  / Bili: x / Urobili: x   Blood: x / Protein: x / Nitrite: x   Leuk Esterase: x / RBC: x / WBC x   Sq Epi: x / Non Sq Epi: x / Bacteria: x              Radiology: Cxray: Grossly clear lungs on wet read.    Bedside lung ultrasound:    Bedside ECHO:    CODE STATUS: FULL  GOC discussion: Y  Critical care time spent (mins): *** Patient is a 69y old  Male who presents with a chief complaint of   HPI: 68 y/o M PMHx, COPD/Asthma on 3L Home O2, HLD, and Pre-DM complaining with worsening shortness of breath. Patient states he was at [facility] 3 weeks ago with similar symptoms. He was started on PO steroids at that time to which he initially improved but has steadily worsened. He has also been using home O2, PO diuretics and nebulizer treatments with minimal relief. Pt states he can hardly tie his shoe, eat food or walk a few feet without being short of breath. Today, his symptoms acutely worsened more, and became extremely diaphoretic which prompted his visit. He is weak with little appetite and feels warm. He feels his SOB has improved since being in the ER. Of note, he has a ~150 pack year history, and quit smoking in his 50s. Due to his significant work of breathing, Dr. Gibbs was called to the bedside. Patient understands the risks of his significant work of breathing, but refuses intubation at this time. He understands that intubation is the safest choice at this time and this can be a life or death decision. However, he is AAOX4, and understands the risks of deferring intubation at this time. His surrogate is his friend Horacio but he doesn't want anyone to be updated at this time. He denies CP, palpitations, nausea, vomiting, diarrhea, fever, chills, and sick contacts.    Allergies: No Known Allergies    PAST MEDICAL & SURGICAL HISTORY:  COPD, severe  Asthma  HLD (hyperlipidemia)  Prediabetes  S/P cataract surgery  R eye 6/6/24, L eye 6/20        FAMILY HISTORY:    SOCIAL HISTORY: +smoker    Home Medications:    Review of Systems:  Constitutional: +Fatigue. +Decreased PO intake. No fever or chills.  Neuro: No headache, numbness, weakness  Resp: +Cough, +Wheezing, +SOB.  CVS: No chest pain, palpitations, leg swelling  GI: No abdominal pain, nausea, vomiting, diarrhea   : No dysuria, frequency, incontinence  Skin: No itching, burning, rashes, or lesions   Msk: No joint pain or swelling  Psych: No depression, anxiety, mood swings      T(F): 97 (03-01-25 @ 07:33), Max: 97 (03-01-25 @ 07:33)  HR: 131 (03-01-25 @ 09:35) (119 - 148)  BP: 116/81 (03-01-25 @ 09:35) (103/52 - 164/130)  RR: 28 (03-01-25 @ 09:35) (17 - 48)  SpO2: 97% (03-01-25 @ 09:35)  Wt(kg): --    CAPILLARY BLOOD GLUCOSE        I&O's Summary      Physical Exam:     Gen: +tripoding, in acute distress 2/2 increased WOB, +on BiPAP.  Neuro: AAOX4  HEENT: NC/AT,   CVS: +Tachycardia. Distant heart sounds.  Resp: +Tachypneic. +Accessory m. use noted +Desaturating to 70s/80s while speaking.  Abd: +Distended. Non-TTP. Bowel sounds present  Ext: No clubbing edema or cyanosis  Skin: WWP    Meds:             dexMEDEtomidine Infusion 0.4 MICROgram(s)/kG/Hr IV Continuous <Continuous>                                                   13.5   14.28 )-----------( 297      ( 01 Mar 2025 07:45 )             40.1       03-01    142  |  112[H]  |  18  ----------------------------<  118[H]  4.0   |  22  |  1.10    Ca    8.9      01 Mar 2025 07:45    TPro  7.5  /  Alb  3.2[L]  /  TBili  0.8  /  DBili  x   /  AST  28  /  ALT  42  /  AlkPhos  77  03-01          PT/INR - ( 01 Mar 2025 07:45 )   PT: 13.3 sec;   INR: 1.13 ratio         PTT - ( 01 Mar 2025 07:45 )  PTT:28.1 sec  Urinalysis Basic - ( 01 Mar 2025 07:45 )    Color: x / Appearance: x / SG: x / pH: x  Gluc: 118 mg/dL / Ketone: x  / Bili: x / Urobili: x   Blood: x / Protein: x / Nitrite: x   Leuk Esterase: x / RBC: x / WBC x   Sq Epi: x / Non Sq Epi: x / Bacteria: x        Radiology: Cxray: Grossly clear lungs on wet read.  < from: Xray Chest 1 View AP/PA (03.01.25 @ 08:48) >  Impression:    Clear lungs. No interval change.    --- End of Report ---    < end of copied text >        CODE STATUS: FULL  GOC discussion: Y

## 2025-03-01 NOTE — ED ADULT NURSE NOTE - OBJECTIVE STATEMENT
Pt BIB work friends c/o respiratory distress. Pt states symptoms started x2 days ago but worsened today. Pt states he has a hx of COPD and was a pervious smoker and uses 3L NC as needed. pt denies any pain at this time. Pt medicated per MD orders. MD and respiratory tech at bedside. Pt placed on bedside cardiac monitoring along with continuous O2 monitoring. Pt remains resting in stretcher. Respiratory symptoms improving., Care continues.

## 2025-03-01 NOTE — CHART NOTE - NSCHARTNOTEFT_GEN_A_CORE
Called to see pt for respiratory failure, COPD exacerbation.  See consult note for full details.  Pt on BiPAP 12/6 60% and increased to 100%  alert and O x 3  rr 40-50s, able to converse but not in full sentences  lungs with moderate air movement, wheezes at L base  CXR clear  Discussed with pt that he requires intubation for respiratory failure and BiPAP is not sufficient.  He is refusing to be intubated at this time and is A and O x 3.  He wants to continue trial of BiPAP and try to avoid intubation.  He understands that intubation is the safest path at this point but does not want to be intubated.  We will give trial of precedex and BiPAP, check ABG.  If any worsening will proceed with intubation.    His surrogate is his friend Horacio and I recommended that we should call and discuss with him, but he does not want me to call Horacio.   Accepted to ICU, see full consult note for full details.

## 2025-03-01 NOTE — PATIENT PROFILE ADULT - VISION (WITH CORRECTIVE LENSES IF THE PATIENT USUALLY WEARS THEM):
Pt has some difficulty reading fine print. Pt plans to get reader glasses./Partially impaired: cannot see medication labels or newsprint, but can see obstacles in path, and the surrounding layout; can count fingers at arm's length

## 2025-03-01 NOTE — H&P ADULT - HISTORY OF PRESENT ILLNESS
68 yo M with PMH of COPD/asthma (on 3L home O2), HLD and prediabetes presents to the ED with worsening shortness of breath. Patient was at [facility] 3 weeks prior with SOB, started on PO steroids with initial improvement but then worsening of symptoms. He is dyspneic on exertion - cannot bend and tie his shoes, eat food or ambulate a few steps without becoming short of breath. Patient also has low appetite. ICU was consulted in the ED, recommended intubation given WOB, however patient declined.

## 2025-03-01 NOTE — H&P ADULT - NSHPSOCIALHISTORY_GEN_ALL_CORE
Alcohol use	Denies  Substance use	Denies  Tobacco use	Former Smoker  Social History	Lives alone  Ambulation	Independent

## 2025-03-01 NOTE — ED PROVIDER NOTE - OBJECTIVE STATEMENT
70 yo M with PMHx COPD (on home O2) Asthma, HLD, prediabetes here due to shortness of breath x 3 days. + sweating, no fevers. No chest pain. No cough / sputum. patient reports it feels like a COPD exacerbation. patient taking  nebs at home without relief. Has appointment with starr GILESD today.

## 2025-03-02 LAB
A1C WITH ESTIMATED AVERAGE GLUCOSE RESULT: 6.6 % — HIGH (ref 4–5.6)
ALBUMIN SERPL ELPH-MCNC: 2.9 G/DL — LOW (ref 3.3–5)
ALP SERPL-CCNC: 70 U/L — SIGNIFICANT CHANGE UP (ref 40–120)
ALT FLD-CCNC: 52 U/L — SIGNIFICANT CHANGE UP (ref 12–78)
ANION GAP SERPL CALC-SCNC: 8 MMOL/L — SIGNIFICANT CHANGE UP (ref 5–17)
AST SERPL-CCNC: 29 U/L — SIGNIFICANT CHANGE UP (ref 15–37)
BASOPHILS # BLD AUTO: 0.01 K/UL — SIGNIFICANT CHANGE UP (ref 0–0.2)
BASOPHILS NFR BLD AUTO: 0.1 % — SIGNIFICANT CHANGE UP (ref 0–2)
BILIRUB SERPL-MCNC: 0.3 MG/DL — SIGNIFICANT CHANGE UP (ref 0.2–1.2)
BUN SERPL-MCNC: 24 MG/DL — HIGH (ref 7–23)
CALCIUM SERPL-MCNC: 8.4 MG/DL — LOW (ref 8.5–10.1)
CHLORIDE SERPL-SCNC: 108 MMOL/L — SIGNIFICANT CHANGE UP (ref 96–108)
CO2 SERPL-SCNC: 22 MMOL/L — SIGNIFICANT CHANGE UP (ref 22–31)
CREAT SERPL-MCNC: 0.94 MG/DL — SIGNIFICANT CHANGE UP (ref 0.5–1.3)
EGFR: 87 ML/MIN/1.73M2 — SIGNIFICANT CHANGE UP
EGFR: 87 ML/MIN/1.73M2 — SIGNIFICANT CHANGE UP
EOSINOPHIL # BLD AUTO: 0 K/UL — SIGNIFICANT CHANGE UP (ref 0–0.5)
EOSINOPHIL NFR BLD AUTO: 0 % — SIGNIFICANT CHANGE UP (ref 0–6)
ESTIMATED AVERAGE GLUCOSE: 143 MG/DL — HIGH (ref 68–114)
GLUCOSE SERPL-MCNC: 247 MG/DL — HIGH (ref 70–99)
HCT VFR BLD CALC: 36.1 % — LOW (ref 39–50)
HGB BLD-MCNC: 12.2 G/DL — LOW (ref 13–17)
IMM GRANULOCYTES NFR BLD AUTO: 1 % — HIGH (ref 0–0.9)
LEGIONELLA AG UR QL: NEGATIVE — SIGNIFICANT CHANGE UP
LYMPHOCYTES # BLD AUTO: 0.75 K/UL — LOW (ref 1–3.3)
LYMPHOCYTES # BLD AUTO: 6.6 % — LOW (ref 13–44)
MAGNESIUM SERPL-MCNC: 2.6 MG/DL — SIGNIFICANT CHANGE UP (ref 1.6–2.6)
MCHC RBC-ENTMCNC: 29 PG — SIGNIFICANT CHANGE UP (ref 27–34)
MCHC RBC-ENTMCNC: 33.8 G/DL — SIGNIFICANT CHANGE UP (ref 32–36)
MCV RBC AUTO: 86 FL — SIGNIFICANT CHANGE UP (ref 80–100)
MONOCYTES # BLD AUTO: 0.49 K/UL — SIGNIFICANT CHANGE UP (ref 0–0.9)
MONOCYTES NFR BLD AUTO: 4.3 % — SIGNIFICANT CHANGE UP (ref 2–14)
NEUTROPHILS # BLD AUTO: 10.04 K/UL — HIGH (ref 1.8–7.4)
NEUTROPHILS NFR BLD AUTO: 88 % — HIGH (ref 43–77)
NRBC BLD AUTO-RTO: 0 /100 WBCS — SIGNIFICANT CHANGE UP (ref 0–0)
PHOSPHATE SERPL-MCNC: 2.2 MG/DL — LOW (ref 2.5–4.5)
PLATELET # BLD AUTO: 263 K/UL — SIGNIFICANT CHANGE UP (ref 150–400)
POTASSIUM SERPL-MCNC: 4.1 MMOL/L — SIGNIFICANT CHANGE UP (ref 3.5–5.3)
POTASSIUM SERPL-SCNC: 4.1 MMOL/L — SIGNIFICANT CHANGE UP (ref 3.5–5.3)
PROT SERPL-MCNC: 6.9 G/DL — SIGNIFICANT CHANGE UP (ref 6–8.3)
RBC # BLD: 4.2 M/UL — SIGNIFICANT CHANGE UP (ref 4.2–5.8)
RBC # FLD: 15.9 % — HIGH (ref 10.3–14.5)
SODIUM SERPL-SCNC: 138 MMOL/L — SIGNIFICANT CHANGE UP (ref 135–145)
WBC # BLD: 11.4 K/UL — HIGH (ref 3.8–10.5)
WBC # FLD AUTO: 11.4 K/UL — HIGH (ref 3.8–10.5)

## 2025-03-02 PROCEDURE — 99233 SBSQ HOSP IP/OBS HIGH 50: CPT | Mod: GC

## 2025-03-02 PROCEDURE — 71275 CT ANGIOGRAPHY CHEST: CPT | Mod: 26

## 2025-03-02 RX ORDER — ATORVASTATIN CALCIUM 80 MG/1
10 TABLET, FILM COATED ORAL AT BEDTIME
Refills: 0 | Status: DISCONTINUED | OUTPATIENT
Start: 2025-03-02 | End: 2025-03-04

## 2025-03-02 RX ORDER — POTASSIUM PHOSPHATE, MONOBASIC POTASSIUM PHOSPHATE, DIBASIC INJECTION, 236; 224 MG/ML; MG/ML
30 SOLUTION, CONCENTRATE INTRAVENOUS ONCE
Refills: 0 | Status: COMPLETED | OUTPATIENT
Start: 2025-03-02 | End: 2025-03-02

## 2025-03-02 RX ORDER — SOD PHOS DI, MONO/K PHOS MONO 250 MG
1 TABLET ORAL THREE TIMES A DAY
Refills: 0 | Status: COMPLETED | OUTPATIENT
Start: 2025-03-02 | End: 2025-03-03

## 2025-03-02 RX ORDER — ENOXAPARIN SODIUM 100 MG/ML
40 INJECTION SUBCUTANEOUS EVERY 24 HOURS
Refills: 0 | Status: DISCONTINUED | OUTPATIENT
Start: 2025-03-02 | End: 2025-03-04

## 2025-03-02 RX ORDER — INSULIN LISPRO 100 U/ML
INJECTION, SOLUTION INTRAVENOUS; SUBCUTANEOUS
Refills: 0 | Status: DISCONTINUED | OUTPATIENT
Start: 2025-03-02 | End: 2025-03-04

## 2025-03-02 RX ORDER — INSULIN LISPRO 100 U/ML
INJECTION, SOLUTION INTRAVENOUS; SUBCUTANEOUS AT BEDTIME
Refills: 0 | Status: DISCONTINUED | OUTPATIENT
Start: 2025-03-02 | End: 2025-03-04

## 2025-03-02 RX ADMIN — INSULIN LISPRO 2: 100 INJECTION, SOLUTION INTRAVENOUS; SUBCUTANEOUS at 07:55

## 2025-03-02 RX ADMIN — INSULIN LISPRO 3: 100 INJECTION, SOLUTION INTRAVENOUS; SUBCUTANEOUS at 11:16

## 2025-03-02 RX ADMIN — Medication 40 MILLIGRAM(S): at 11:07

## 2025-03-02 RX ADMIN — ATORVASTATIN CALCIUM 10 MILLIGRAM(S): 80 TABLET, FILM COATED ORAL at 21:17

## 2025-03-02 RX ADMIN — Medication 1 PACKET(S): at 21:17

## 2025-03-02 RX ADMIN — DEXMEDETOMIDINE HYDROCHLORIDE IN SODIUM CHLORIDE 7.71 MICROGRAM(S)/KG/HR: 4 INJECTION INTRAVENOUS at 02:13

## 2025-03-02 RX ADMIN — Medication 1 PACKET(S): at 10:06

## 2025-03-02 RX ADMIN — POTASSIUM PHOSPHATE, MONOBASIC POTASSIUM PHOSPHATE, DIBASIC INJECTION, 83.33 MILLIMOLE(S): 236; 224 SOLUTION, CONCENTRATE INTRAVENOUS at 09:35

## 2025-03-02 RX ADMIN — METHYLPREDNISOLONE ACETATE 40 MILLIGRAM(S): 80 INJECTION, SUSPENSION INTRA-ARTICULAR; INTRALESIONAL; INTRAMUSCULAR; SOFT TISSUE at 05:07

## 2025-03-02 RX ADMIN — ENOXAPARIN SODIUM 40 MILLIGRAM(S): 100 INJECTION SUBCUTANEOUS at 11:07

## 2025-03-02 RX ADMIN — IPRATROPIUM BROMIDE AND ALBUTEROL SULFATE 3 MILLILITER(S): .5; 2.5 SOLUTION RESPIRATORY (INHALATION) at 01:13

## 2025-03-02 RX ADMIN — METHYLPREDNISOLONE ACETATE 40 MILLIGRAM(S): 80 INJECTION, SUSPENSION INTRA-ARTICULAR; INTRALESIONAL; INTRAMUSCULAR; SOFT TISSUE at 21:17

## 2025-03-02 RX ADMIN — IPRATROPIUM BROMIDE AND ALBUTEROL SULFATE 3 MILLILITER(S): .5; 2.5 SOLUTION RESPIRATORY (INHALATION) at 08:11

## 2025-03-02 RX ADMIN — INSULIN LISPRO 2: 100 INJECTION, SOLUTION INTRAVENOUS; SUBCUTANEOUS at 17:21

## 2025-03-02 RX ADMIN — BUDESONIDE 0.5 MILLIGRAM(S): 0.25 SUSPENSION RESPIRATORY (INHALATION) at 19:33

## 2025-03-02 RX ADMIN — Medication 250 MILLIGRAM(S): at 11:07

## 2025-03-02 RX ADMIN — IPRATROPIUM BROMIDE AND ALBUTEROL SULFATE 3 MILLILITER(S): .5; 2.5 SOLUTION RESPIRATORY (INHALATION) at 13:39

## 2025-03-02 RX ADMIN — METHYLPREDNISOLONE ACETATE 40 MILLIGRAM(S): 80 INJECTION, SUSPENSION INTRA-ARTICULAR; INTRALESIONAL; INTRAMUSCULAR; SOFT TISSUE at 13:29

## 2025-03-02 RX ADMIN — IPRATROPIUM BROMIDE AND ALBUTEROL SULFATE 3 MILLILITER(S): .5; 2.5 SOLUTION RESPIRATORY (INHALATION) at 19:33

## 2025-03-02 RX ADMIN — CEFTRIAXONE 100 MILLIGRAM(S): 500 INJECTION, POWDER, FOR SOLUTION INTRAMUSCULAR; INTRAVENOUS at 11:59

## 2025-03-02 RX ADMIN — DEXMEDETOMIDINE HYDROCHLORIDE IN SODIUM CHLORIDE 7.71 MICROGRAM(S)/KG/HR: 4 INJECTION INTRAVENOUS at 10:07

## 2025-03-02 RX ADMIN — BUDESONIDE 0.5 MILLIGRAM(S): 0.25 SUSPENSION RESPIRATORY (INHALATION) at 08:11

## 2025-03-02 NOTE — PROGRESS NOTE ADULT - ASSESSMENT
70 yo M with PMH of COPD/asthma (on 3L home O2), HLD and prediabetes presented to the ED with worsening shortness of breath, admitted to the ICU for acute hypoxic respiratory failure 2/2 COPD exacerbation.    #Acute  on chr   hypoxic respiratory failure 2/2 COPD exacerbation  #Asthma  - Admit to ICU  - Continue BiPAP  - Patient has declined intubation at this time; trial of Precedex with BiPAP per ICU  - Continue IV solumedrol 40 mg ivpb q8hr   - Continue Rocephin and dc Azithro  - Continue Duonebs, Budesonide nebs  - Follow up full RVP neg, Strep  pending and Legionella antigens neg   - Rest of management per ICU    #Prediabetes  - Hold home metformin  - LDISS, accuchecks Q6h, hypoglycemia protocol  - Follow up A1c 6.6      - Hx mild diastolic chf - on home Lasix / on hold , echo [id number]%       #VTE ppx  - Per ICU

## 2025-03-02 NOTE — CONSULT NOTE ADULT - SUBJECTIVE AND OBJECTIVE BOX
Date/Time Patient Seen:  		  Referring MD:   Data Reviewed	       Patient is a 69y old  Male who presents with a chief complaint of Acute hypoxic respiratory failure, COPD exacerbation (01 Mar 2025 20:30)      Subjective/HPI   Source of Information	Chart(s), Physician/Provider  Outpatient Providers	PCP: Dr. Escamilla  Cardio: [facility]  Pulm: Follows       Patient Identity:  · Birth Sex	Male  · Patient's Preferred Pronoun	Him/He     History of Present Illness:  Reason for Admission: Acute hypoxic respiratory failure, COPD exacerbation  History of Present Illness:   70 yo M with PMH of COPD/asthma (on 3L home O2), HLD and prediabetes presents to the ED with worsening shortness of breath. Patient was at [facility] 3 weeks prior with SOB, started on PO steroids with initial improvement but then worsening of symptoms. He is dyspneic on exertion - cannot bend and tie his shoes, eat food or ambulate a few steps without becoming short of breath. Patient also has low appetite. ICU was consulted in the ED, recommended intubation given WOB, however patient declined.        Review of Systems:  Unable to obtain due to: Severe illness/injury      Allergies and Intolerances:        Allergies:  	No Known Allergies:     Home Medications:   * Patient Currently Takes Medications as of 01-Mar-2025 10:36 documented in Structured Notes  · 	Atrovent HFA 17 mcg/inh inhalation aerosol: Last Dose Taken:  , 2 puff(s) by metered dose inhaler 4 times a day  · 	Lasix 20 mg oral tablet: Last Dose Taken:  , 1 tab(s) orally once a day  · 	simvastatin 10 mg oral tablet: Last Dose Taken:  , 1 tab(s) orally once a day (at bedtime)  · 	metFORMIN 500 mg oral tablet: Last Dose Taken:  , 1 tab(s) orally once a day  · 	Trelegy Ellipta 100 mcg-62.5 mcg-25 mcg/inh inhalation powder: Last Dose Taken:  , 1 puff(s) inhaled once a day  · 	Albuterol 200 PA HFA: Last Dose Taken:  , 2 puffs BID PRN for bronchospasm    .    Patient History:    Past Medical, Past Surgical, and Family History:  PAST MEDICAL HISTORY:  Asthma     COPD, severe     HLD (hyperlipidemia)     Prediabetes.     PAST SURGICAL HISTORY:  S/P cataract surgery R eye 6/6/24, L eye 6/20.     Social History:  · Substance use	No  · Social History (marital status, living situation, occupation, and sexual history)	Alcohol use	Denies  Substance use	Denies  Tobacco use	Former Smoker  Social History	Lives alone  Ambulation	Independent     Tobacco Screening:  · Core Measure Site	Yes  · Has the patient used tobacco in the past 30 days?	No     Risk Assessment:    Present on Admission:  Deep Venous Thrombosis	no   Pulmonary Embolus	no      HIV Screening:  · In accordance with [facility] law, we offer every patient who comes to our ED an HIV test. Would you like to be tested today?	Opt out      Hepatitis C Test Questions:  · In accordance with [facility] Law, we offer every patient a Hepatitis C test. Would you like to be tested today?	Opt out    PAST MEDICAL & SURGICAL HISTORY:  COPD, severe    Asthma    HLD (hyperlipidemia)    Prediabetes    S/P cataract surgery  R eye 6/6/24, L eye 6/20          Medication list         MEDICATIONS  (STANDING):  albuterol/ipratropium for Nebulization 3 milliLiter(s) Nebulizer every 6 hours  azithromycin  IVPB 500 milliGRAM(s) IV Intermittent daily  buDESOnide    Inhalation Suspension 0.5 milliGRAM(s) Inhalation every 12 hours  cefTRIAXone   IVPB 1000 milliGRAM(s) IV Intermittent every 24 hours  chlorhexidine 2% Cloths 1 Application(s) Topical <User Schedule>  dexMEDEtomidine Infusion 0.4 MICROgram(s)/kG/Hr (7.71 mL/Hr) IV Continuous <Continuous>  dextrose 5%. 1000 milliLiter(s) (50 mL/Hr) IV Continuous <Continuous>  dextrose 5%. 1000 milliLiter(s) (100 mL/Hr) IV Continuous <Continuous>  dextrose 50% Injectable 25 Gram(s) IV Push once  dextrose 50% Injectable 12.5 Gram(s) IV Push once  dextrose 50% Injectable 25 Gram(s) IV Push once  enoxaparin Injectable 40 milliGRAM(s) SubCutaneous every 24 hours  glucagon  Injectable 1 milliGRAM(s) IntraMuscular once  insulin lispro (ADMELOG) corrective regimen sliding scale   SubCutaneous three times a day before meals  insulin lispro (ADMELOG) corrective regimen sliding scale   SubCutaneous at bedtime  methylPREDNISolone sodium succinate Injectable 40 milliGRAM(s) IV Push every 8 hours  pantoprazole  Injectable 40 milliGRAM(s) IV Push daily    MEDICATIONS  (PRN):  acetaminophen     Tablet .. 650 milliGRAM(s) Oral every 6 hours PRN Temp greater or equal to 38C (100.4F)  albuterol    90 MICROgram(s) HFA Inhaler 2 Puff(s) Inhalation every 2 hours PRN Shortness of Breath and/or Wheezing  dextrose Oral Gel 15 Gram(s) Oral once PRN Blood Glucose LESS THAN 70 milliGRAM(s)/deciliter         Vitals log        ICU Vital Signs Last 24 Hrs  T(C): 36.6 (02 Mar 2025 04:12), Max: 37.3 (01 Mar 2025 12:06)  T(F): 97.9 (02 Mar 2025 04:12), Max: 99.1 (01 Mar 2025 12:06)  HR: 69 (02 Mar 2025 04:00) (67 - 148)  BP: 121/76 (02 Mar 2025 04:00) (94/62 - 178/111)  BP(mean): 90 (02 Mar 2025 04:00) (66 - 91)  ABP: --  ABP(mean): --  RR: 19 (02 Mar 2025 04:00) (15 - 48)  SpO2: 98% (02 Mar 2025 04:00) (70% - 100%)    O2 Parameters below as of 02 Mar 2025 01:25  Patient On (Oxygen Delivery Method): nasal cannula, 3L                 Input and Output:  I&O's Detail    01 Mar 2025 07:01  -  02 Mar 2025 04:51  --------------------------------------------------------  IN:    Dexmedetomidine: 103.1 mL    IV PiggyBack: 50 mL    IV PiggyBack: 250 mL    Oral Fluid: 200 mL  Total IN: 603.1 mL    OUT:    Voided (mL): 350 mL  Total OUT: 350 mL    Total NET: 253.1 mL          Lab Data                        13.5   14.28 )-----------( 297      ( 01 Mar 2025 07:45 )             40.1     03-01    142  |  112[H]  |  18  ----------------------------<  118[H]  4.0   |  22  |  1.10    Ca    8.9      01 Mar 2025 07:45    TPro  7.5  /  Alb  3.2[L]  /  TBili  0.8  /  DBili  x   /  AST  28  /  ALT  42  /  AlkPhos  77  03-01    ABG - ( 01 Mar 2025 10:02 )  pH, Arterial: 7.36  pH, Blood: x     /  pCO2: 36    /  pO2: 297   / HCO3: 20    / Base Excess: -5.1  /  SaO2: 100.0                   Review of Systems	      Objective     Physical Examination        Pertinent Lab findings & Imaging      Flores:  NO   Adequate UO     I&O's Detail    01 Mar 2025 07:01  -  02 Mar 2025 04:51  --------------------------------------------------------  IN:    Dexmedetomidine: 103.1 mL    IV PiggyBack: 50 mL    IV PiggyBack: 250 mL    Oral Fluid: 200 mL  Total IN: 603.1 mL    OUT:    Voided (mL): 350 mL  Total OUT: 350 mL    Total NET: 253.1 mL               Discussed with:     Cultures:	        Radiology    ACC: [id number] EXAM:  XR CHEST AP OR PA 1V   ORDERED BY: PERICO SANON     PROCEDURE DATE:  03/01/2025          INTERPRETATION:  History: Dyspnea    Technique: Single frontal view of the chest    Comparison: 8/10/2024    Findings:    Lungs: The chin obscures the left apex. Lungs are otherwise clear.    Heart/Mediastinum: Heart size is normal.    Osseous structures: Within normal limits    Impression:    Clear lungs. No interval change.    --- End of Report ---            JASPER COCHRAN MD; Attending Radiologist  This document has been electronically signed. Mar  1 2025 11:02AM                           Date/Time Patient Seen:  		  Referring MD:   Data Reviewed	       Patient is a 69y old  Male who presents with a chief complaint of Acute hypoxic respiratory failure, COPD exacerbation (01 Mar 2025 20:30)      Subjective/HPI   Source of Information	Chart(s), Physician/Provider  Outpatient Providers	PCP: Dr. Escamilla  Cardio: [facility]  Pulm: Follows       Patient Identity:  · Birth Sex	Male  · Patient's Preferred Pronoun	Him/He     History of Present Illness:  Reason for Admission: Acute hypoxic respiratory failure, COPD exacerbation  History of Present Illness:   68 yo M with PMH of COPD/asthma (on 3L home O2), HLD and prediabetes presents to the ED with worsening shortness of breath. Patient was at [facility] 3 weeks prior with SOB, started on PO steroids with initial improvement but then worsening of symptoms. He is dyspneic on exertion - cannot bend and tie his shoes, eat food or ambulate a few steps without becoming short of breath. Patient also has low appetite. ICU was consulted in the ED, recommended intubation given WOB, however patient declined.        Review of Systems:  Unable to obtain due to: Severe illness/injury      Allergies and Intolerances:        Allergies:  	No Known Allergies:     Home Medications:   * Patient Currently Takes Medications as of 01-Mar-2025 10:36 documented in Structured Notes  · 	Atrovent HFA 17 mcg/inh inhalation aerosol: Last Dose Taken:  , 2 puff(s) by metered dose inhaler 4 times a day  · 	Lasix 20 mg oral tablet: Last Dose Taken:  , 1 tab(s) orally once a day  · 	simvastatin 10 mg oral tablet: Last Dose Taken:  , 1 tab(s) orally once a day (at bedtime)  · 	metFORMIN 500 mg oral tablet: Last Dose Taken:  , 1 tab(s) orally once a day  · 	Trelegy Ellipta 100 mcg-62.5 mcg-25 mcg/inh inhalation powder: Last Dose Taken:  , 1 puff(s) inhaled once a day  · 	Albuterol 200 PA HFA: Last Dose Taken:  , 2 puffs BID PRN for bronchospasm    .    Patient History:    Past Medical, Past Surgical, and Family History:  PAST MEDICAL HISTORY:  Asthma     COPD, severe     HLD (hyperlipidemia)     Prediabetes.     PAST SURGICAL HISTORY:  S/P cataract surgery R eye 6/6/24, L eye 6/20.     Social History:  · Substance use	No  · Social History (marital status, living situation, occupation, and sexual history)	Alcohol use	Denies  Substance use	Denies  Tobacco use	Former Smoker  Social History	Lives alone  Ambulation	Independent     Tobacco Screening:  · Core Measure Site	Yes  · Has the patient used tobacco in the past 30 days?	No     Risk Assessment:    Present on Admission:  Deep Venous Thrombosis	no   Pulmonary Embolus	no      HIV Screening:  · In accordance with [facility] law, we offer every patient who comes to our ED an HIV test. Would you like to be tested today?	Opt out      Hepatitis C Test Questions:  · In accordance with [facility] Law, we offer every patient a Hepatitis C test. Would you like to be tested today?	Opt out    PAST MEDICAL & SURGICAL HISTORY:  COPD, severe    Asthma    HLD (hyperlipidemia)    Prediabetes    S/P cataract surgery  R eye 6/6/24, L eye 6/20          Medication list         MEDICATIONS  (STANDING):  albuterol/ipratropium for Nebulization 3 milliLiter(s) Nebulizer every 6 hours  azithromycin  IVPB 500 milliGRAM(s) IV Intermittent daily  buDESOnide    Inhalation Suspension 0.5 milliGRAM(s) Inhalation every 12 hours  cefTRIAXone   IVPB 1000 milliGRAM(s) IV Intermittent every 24 hours  chlorhexidine 2% Cloths 1 Application(s) Topical <User Schedule>  dexMEDEtomidine Infusion 0.4 MICROgram(s)/kG/Hr (7.71 mL/Hr) IV Continuous <Continuous>  dextrose 5%. 1000 milliLiter(s) (50 mL/Hr) IV Continuous <Continuous>  dextrose 5%. 1000 milliLiter(s) (100 mL/Hr) IV Continuous <Continuous>  dextrose 50% Injectable 25 Gram(s) IV Push once  dextrose 50% Injectable 12.5 Gram(s) IV Push once  dextrose 50% Injectable 25 Gram(s) IV Push once  enoxaparin Injectable 40 milliGRAM(s) SubCutaneous every 24 hours  glucagon  Injectable 1 milliGRAM(s) IntraMuscular once  insulin lispro (ADMELOG) corrective regimen sliding scale   SubCutaneous three times a day before meals  insulin lispro (ADMELOG) corrective regimen sliding scale   SubCutaneous at bedtime  methylPREDNISolone sodium succinate Injectable 40 milliGRAM(s) IV Push every 8 hours  pantoprazole  Injectable 40 milliGRAM(s) IV Push daily    MEDICATIONS  (PRN):  acetaminophen     Tablet .. 650 milliGRAM(s) Oral every 6 hours PRN Temp greater or equal to 38C (100.4F)  albuterol    90 MICROgram(s) HFA Inhaler 2 Puff(s) Inhalation every 2 hours PRN Shortness of Breath and/or Wheezing  dextrose Oral Gel 15 Gram(s) Oral once PRN Blood Glucose LESS THAN 70 milliGRAM(s)/deciliter         Vitals log        ICU Vital Signs Last 24 Hrs  T(C): 36.6 (02 Mar 2025 04:12), Max: 37.3 (01 Mar 2025 12:06)  T(F): 97.9 (02 Mar 2025 04:12), Max: 99.1 (01 Mar 2025 12:06)  HR: 69 (02 Mar 2025 04:00) (67 - 148)  BP: 121/76 (02 Mar 2025 04:00) (94/62 - 178/111)  BP(mean): 90 (02 Mar 2025 04:00) (66 - 91)  ABP: --  ABP(mean): --  RR: 19 (02 Mar 2025 04:00) (15 - 48)  SpO2: 98% (02 Mar 2025 04:00) (70% - 100%)    O2 Parameters below as of 02 Mar 2025 01:25  Patient On (Oxygen Delivery Method): nasal cannula, 3L                 Input and Output:  I&O's Detail    01 Mar 2025 07:01  -  02 Mar 2025 04:51  --------------------------------------------------------  IN:    Dexmedetomidine: 103.1 mL    IV PiggyBack: 50 mL    IV PiggyBack: 250 mL    Oral Fluid: 200 mL  Total IN: 603.1 mL    OUT:    Voided (mL): 350 mL  Total OUT: 350 mL    Total NET: 253.1 mL          Lab Data                        13.5   14.28 )-----------( 297      ( 01 Mar 2025 07:45 )             40.1     03-01    142  |  112[H]  |  18  ----------------------------<  118[H]  4.0   |  22  |  1.10    Ca    8.9      01 Mar 2025 07:45    TPro  7.5  /  Alb  3.2[L]  /  TBili  0.8  /  DBili  x   /  AST  28  /  ALT  42  /  AlkPhos  77  03-01    ABG - ( 01 Mar 2025 10:02 )  pH, Arterial: 7.36  pH, Blood: x     /  pCO2: 36    /  pO2: 297   / HCO3: 20    / Base Excess: -5.1  /  SaO2: 100.0                   Review of Systems	  sob  delgado  weakness      Objective     Physical Examination      resp distress  heart s1s2  lung dc bS  head nc  head at    Pertinent Lab findings & Imaging      Mark:  NO   Adequate UO     I&O's Detail    01 Mar 2025 07:01  -  02 Mar 2025 04:51  --------------------------------------------------------  IN:    Dexmedetomidine: 103.1 mL    IV PiggyBack: 50 mL    IV PiggyBack: 250 mL    Oral Fluid: 200 mL  Total IN: 603.1 mL    OUT:    Voided (mL): 350 mL  Total OUT: 350 mL    Total NET: 253.1 mL               Discussed with:     Cultures:	        Radiology    ACC: [id number] EXAM:  XR CHEST AP OR PA 1V   ORDERED BY: PERICO SANON     PROCEDURE DATE:  03/01/2025          INTERPRETATION:  History: Dyspnea    Technique: Single frontal view of the chest    Comparison: 8/10/2024    Findings:    Lungs: The chin obscures the left apex. Lungs are otherwise clear.    Heart/Mediastinum: Heart size is normal.    Osseous structures: Within normal limits    Impression:    Clear lungs. No interval change.    --- End of Report ---            JASPER COCHRAN MD; Attending Radiologist  This document has been electronically signed. Mar  1 2025 11:02AM

## 2025-03-02 NOTE — PROGRESS NOTE ADULT - ASSESSMENT
HPI: 68 y/o M PMHx, COPD/Asthma on 3L Home O2, HLD, and Pre-DM complaining with worsening shortness of breath. ICU consulted for COPD exacerbation and significant work of breathing.    #AHRF 2/2 COPD Exacerbation  #Asthma  #HLD  #Pre-DM  #R/o lactic acidosis  #Tachycardia    #Neuro  -AAOX4  -Precedex gtt as pt anxious appearing; Mentating well.    #Respiratory  -Saturating well on 4L NC.  -Previously On BiPAP in the ER on max settings and desaturating with conversation. ABG at that time was: pH 7.36, PCO2 36, HCO3 20.   -Dr Gibbs advised patient that he required intubation. He refused despite cont'd counseling. He is AAOX3, with a normal blood gas that would not affect his decision making capacity. However, pt agreeable for intubation if he decompensates.  -Pt tachycardic and hypoxic, which may be caused by PE; however, patient is too unstable for CTA at this time. In addition, patient is warm on exam and may have underlying febrile illness driving tachycardia. Will check rectal T on arrival to ICU.  -Continue IV solumedrol 40mg q8hrs as pt already on PO steroids at home  -Pt high risk COPD exacerbation given age >65, >2 COPD exac this year, and recent hospitalization; will initiate treatment for CAP. Rocephin & azithro.  -Duonebs q6, Budesonide nebs q12  -Albuterol q2 PRN  -Interchange home trellegy inhaler as above  -F/U full viral panel     #Cardiac  -tachycardia:       -Sinus tachy on EKG, suspect 2/2 to his work of breathing, CTA negative for PE.  -Ischemia:       -No signs/sz of ischemia, EKG sinus tachycardia w/o [place] and pt denying active CP       -Resume home statin.  -Volume:        -appears euvolemic on examination        -no known hx of CHF, no TTE on file, will obtain  -BP        -Hypertensive in the ED but improved with precedexx, will monitor routine hemodynamics    #GI  -PPI ppx  -Diet ordered    #Endo  -Lactate 2. R/o metformin induced lactic acidosis  -Pre-DM, obtain a1c  -Hold metformin  -MDISS as pt now on steroids  -F/U A1C    #Renal  -Cr 1.1, baseline ~1  -No active issues  -Avoid nephrotoxic medications as able    #ID  -Given high risk COPD exacerbation, will treat empirically for CAP.  On Rocephin, [place] DC'd.  -Leukocytosis 2/2 PO steroid use x3 weeks VS febrile illness  -RVP negative, pt warm to touch. T 97.  -Cxray without obvious consolidation  -Monitor WBC, fever curve.  -Tylenol PRN.   -egionella and strep Ur Ag negative    #Heme  -No signs/sz of bleeding. Hgb stable  -DVT PPX Lovenoz 40mg subQ daily

## 2025-03-02 NOTE — PROGRESS NOTE ADULT - ATTENDING COMMENTS
pt seen and examine today see  above plan  - admitted to the ICU for acute  on chr  hypoxic respiratory failure 2/2 COPD exacerbation with ct chest  new 6mm pulm nodule  .

## 2025-03-02 NOTE — PROGRESS NOTE ADULT - SUBJECTIVE AND OBJECTIVE BOX
Patient is a 69y old  Male who presents with a chief complaint of Acute hypoxic respiratory failure, COPD exacerbation (02 Mar 2025 13:06)      INTERVAL HPI/OVERNIGHT EVENTS:     T(C): 36.9 (03-02-25 @ 08:48), Max: 37.2 (03-01-25 @ 20:23)  HR: 74 (03-02-25 @ 13:43) (66 - 88)  BP: 91/55 (03-02-25 @ 13:00) (77/61 - 132/81)  RR: 24 (03-02-25 @ 09:00) (15 - 33)  SpO2: 93% (03-02-25 @ 13:43) (90% - 100%)  Wt(kg): --  I&O's Summary    01 Mar 2025 07:01  -  02 Mar 2025 07:00  --------------------------------------------------------  IN: 1037.8 mL / OUT: 350 mL / NET: 687.8 mL    02 Mar 2025 07:01  -  02 Mar 2025 14:23  --------------------------------------------------------  IN: 959.7 mL / OUT: 650 mL / NET: 309.7 mL        REVIEW OF SYSTEMS:  CONSTITUTIONAL: No fever, weight loss, or fatigue  EYES: No eye pain, visual disturbances, or discharge  ENMT:  No difficulty hearing, tinnitus, vertigo; No sinus or throat pain  NECK: No pain or stiffness  BREASTS: No pain, no masses  RESPIRATORY: No cough, wheezing, chills or hemoptysis; No shortness of breath  CARDIOVASCULAR: No chest pain, palpitations, dizziness, or leg swelling  GASTROINTESTINAL: No abdominal or epigastric pain. No nausea, vomiting, or hematemesis; No diarrhea or constipation. No melena or hematochezia.  GENITOURINARY: No dysuria, frequency, hematuria, or incontinence  NEUROLOGICAL: No headaches, memory loss, loss of strength, numbness, or tremors  SKIN: No itching, burning, rashes, or lesions   MUSCULOSKELETAL: No joint pain or swelling; No muscle, back, or extremity pain    PHYSICAL EXAM:  GENERAL: NAD, well-groomed, well-developed  HEAD:  Atraumatic, Normocephalic  EYES: EOMI, PERRLA, conjunctiva and sclera clear  ENMT: No tonsillar erythema, exudates, or enlargement; Moist mucous membranes  NECK: Supple, No JVD  NERVOUS SYSTEM:  Alert & Oriented X3; Motor Strength 5/5 B/L upper and lower extremities; DTRs 2+ intact and symmetric  CHEST/LUNG: Clear to percussion bilaterally; No rales, rhonchi, wheezing, or rubs  HEART: Regular rate and rhythm; No murmurs, rubs, or gallops  ABDOMEN: Soft, Nontender, Nondistended; Bowel sounds present  EXTREMITIES:  2+ Peripheral Pulses, No clubbing, cyanosis, or edema  SKIN: No rashes or lesions    MEDICATIONS  (STANDING):  albuterol/ipratropium for Nebulization 3 milliLiter(s) Nebulizer every 6 hours  atorvastatin 10 milliGRAM(s) Oral at bedtime  buDESOnide    Inhalation Suspension 0.5 milliGRAM(s) Inhalation every 12 hours  cefTRIAXone   IVPB 1000 milliGRAM(s) IV Intermittent every 24 hours  chlorhexidine 2% Cloths 1 Application(s) Topical <User Schedule>  dexMEDEtomidine Infusion 0.4 MICROgram(s)/kG/Hr (7.71 mL/Hr) IV Continuous <Continuous>  dextrose 5%. 1000 milliLiter(s) (50 mL/Hr) IV Continuous <Continuous>  dextrose 5%. 1000 milliLiter(s) (100 mL/Hr) IV Continuous <Continuous>  dextrose 50% Injectable 25 Gram(s) IV Push once  dextrose 50% Injectable 12.5 Gram(s) IV Push once  dextrose 50% Injectable 25 Gram(s) IV Push once  enoxaparin Injectable 40 milliGRAM(s) SubCutaneous every 24 hours  glucagon  Injectable 1 milliGRAM(s) IntraMuscular once  insulin lispro (ADMELOG) corrective regimen sliding scale   SubCutaneous three times a day before meals  insulin lispro (ADMELOG) corrective regimen sliding scale   SubCutaneous at bedtime  methylPREDNISolone sodium succinate Injectable 40 milliGRAM(s) IV Push every 8 hours  pantoprazole  Injectable 40 milliGRAM(s) IV Push daily  potassium phosphate / sodium phosphate Powder (PHOS-NaK) 1 Packet(s) Oral three times a day    MEDICATIONS  (PRN):  acetaminophen     Tablet .. 650 milliGRAM(s) Oral every 6 hours PRN Temp greater or equal to 38C (100.4F)  albuterol    90 MICROgram(s) HFA Inhaler 2 Puff(s) Inhalation every 2 hours PRN Shortness of Breath and/or Wheezing  dextrose Oral Gel 15 Gram(s) Oral once PRN Blood Glucose LESS THAN 70 milliGRAM(s)/deciliter      LABS:                        12.2   11.40 )-----------( 263      ( 02 Mar 2025 05:55 )             36.1     03-02    138  |  108  |  24[H]  ----------------------------<  247[H]  4.1   |  22  |  0.94    Ca    8.4[L]      02 Mar 2025 05:55  Phos  2.2     03-02  Mg     2.6     03-02    TPro  6.9  /  Alb  2.9[L]  /  TBili  0.3  /  DBili  x   /  AST  29  /  ALT  52  /  AlkPhos  70  03-02    PT/INR - ( 01 Mar 2025 07:45 )   PT: 13.3 sec;   INR: 1.13 ratio         PTT - ( 01 Mar 2025 07:45 )  PTT:28.1 sec  Urinalysis Basic - ( 02 Mar 2025 05:55 )    Color: x / Appearance: x / SG: x / pH: x  Gluc: 247 mg/dL / Ketone: x  / Bili: x / Urobili: x   Blood: x / Protein: x / Nitrite: x   Leuk Esterase: x / RBC: x / WBC x   Sq Epi: x / Non Sq Epi: x / Bacteria: x      CAPILLARY BLOOD GLUCOSE      POCT Blood Glucose.: 270 mg/dL (02 Mar 2025 11:15)  POCT Blood Glucose.: 211 mg/dL (02 Mar 2025 07:37)  POCT Blood Glucose.: 164 mg/dL (01 Mar 2025 21:43)  POCT Blood Glucose.: 209 mg/dL (01 Mar 2025 16:55)    ABG - ( 01 Mar 2025 10:02 )  pH, Arterial: 7.36  pH, Blood: x     /  pCO2: 36    /  pO2: 297   / HCO3: 20    / Base Excess: -5.1  /  SaO2: 100.0                     RADIOLOGY & ADDITIONAL TESTS:    Imaging Personally Reviewed:       Advance Directives:      Palliative Care:  Appropriate     Patient is a 69y old  Male who presents with a chief complaint of Acute hypoxic respiratory failure, COPD exacerbation (02 Mar 2025 13:06)      INTERVAL HPI/OVERNIGHT EVENTS:     T(C): 36.9 (03-02-25 @ 08:48), Max: 37.2 (03-01-25 @ 20:23)  HR: 74 (03-02-25 @ 13:43) (66 - 88)  BP: 91/55 (03-02-25 @ 13:00) (77/61 - 132/81)  RR: 24 (03-02-25 @ 09:00) (15 - 33)  SpO2: 93% (03-02-25 @ 13:43) (90% - 100%)  Wt(kg): --  I&O's Summary    01 Mar 2025 07:01  -  02 Mar 2025 07:00  --------------------------------------------------------  IN: 1037.8 mL / OUT: 350 mL / NET: 687.8 mL    02 Mar 2025 07:01  -  02 Mar 2025 14:23  --------------------------------------------------------  IN: 959.7 mL / OUT: 650 mL / NET: 309.7 mL      PHYSICAL EXAM:  Physical Exam:   Gen: well appearing, eating lunch, appears stated age; [facility]  Neuro:  AAOX3  HEENT: NC/AT, EOMI.  Resp: Slight expiratory wheezes L lung base, otherwise CTA BL. No accessory m. use. Speaking in full sentences  CVS: +s1, s2. RRR  Abd: Soft NT ND, Bowel sounds present  Ext: No clubbing cyanosis or edema  Skin: WWP    MEDICATIONS  (STANDING):  albuterol/ipratropium for Nebulization 3 milliLiter(s) Nebulizer every 6 hours  atorvastatin 10 milliGRAM(s) Oral at bedtime  buDESOnide    Inhalation Suspension 0.5 milliGRAM(s) Inhalation every 12 hours  cefTRIAXone   IVPB 1000 milliGRAM(s) IV Intermittent every 24 hours  chlorhexidine 2% Cloths 1 Application(s) Topical <User Schedule>  dexMEDEtomidine Infusion 0.4 MICROgram(s)/kG/Hr (7.71 mL/Hr) IV Continuous <Continuous>  dextrose 5%. 1000 milliLiter(s) (50 mL/Hr) IV Continuous <Continuous>  dextrose 5%. 1000 milliLiter(s) (100 mL/Hr) IV Continuous <Continuous>  dextrose 50% Injectable 25 Gram(s) IV Push once  dextrose 50% Injectable 12.5 Gram(s) IV Push once  dextrose 50% Injectable 25 Gram(s) IV Push once  enoxaparin Injectable 40 milliGRAM(s) SubCutaneous every 24 hours  glucagon  Injectable 1 milliGRAM(s) IntraMuscular once  insulin lispro (ADMELOG) corrective regimen sliding scale   SubCutaneous three times a day before meals  insulin lispro (ADMELOG) corrective regimen sliding scale   SubCutaneous at bedtime  methylPREDNISolone sodium succinate Injectable 40 milliGRAM(s) IV Push every 8 hours  pantoprazole  Injectable 40 milliGRAM(s) IV Push daily  potassium phosphate / sodium phosphate Powder (PHOS-NaK) 1 Packet(s) Oral three times a day    MEDICATIONS  (PRN):  acetaminophen     Tablet .. 650 milliGRAM(s) Oral every 6 hours PRN Temp greater or equal to 38C (100.4F)  albuterol    90 MICROgram(s) HFA Inhaler 2 Puff(s) Inhalation every 2 hours PRN Shortness of Breath and/or Wheezing  dextrose Oral Gel 15 Gram(s) Oral once PRN Blood Glucose LESS THAN 70 milliGRAM(s)/deciliter      LABS:                        12.2   11.40 )-----------( 263      ( 02 Mar 2025 05:55 )             36.1     03-02    138  |  108  |  24[H]  ----------------------------<  247[H]  4.1   |  22  |  0.94    Ca    8.4[L]      02 Mar 2025 05:55  Phos  2.2     03-02  Mg     2.6     03-02    TPro  6.9  /  Alb  2.9[L]  /  TBili  0.3  /  DBili  x   /  AST  29  /  ALT  52  /  AlkPhos  70  03-02    PT/INR - ( 01 Mar 2025 07:45 )   PT: 13.3 sec;   INR: 1.13 ratio         PTT - ( 01 Mar 2025 07:45 )  PTT:28.1 sec  Urinalysis Basic - ( 02 Mar 2025 05:55 )    Color: x / Appearance: x / SG: x / pH: x  Gluc: 247 mg/dL / Ketone: x  / Bili: x / Urobili: x   Blood: x / Protein: x / Nitrite: x   Leuk Esterase: x / RBC: x / WBC x   Sq Epi: x / Non Sq Epi: x / Bacteria: x      CAPILLARY BLOOD GLUCOSE      POCT Blood Glucose.: 270 mg/dL (02 Mar 2025 11:15)  POCT Blood Glucose.: 211 mg/dL (02 Mar 2025 07:37)  POCT Blood Glucose.: 164 mg/dL (01 Mar 2025 21:43)  POCT Blood Glucose.: 209 mg/dL (01 Mar 2025 16:55)    ABG - ( 01 Mar 2025 10:02 )  pH, Arterial: 7.36  pH, Blood: x     /  pCO2: 36    /  pO2: 297   / HCO3: 20    / Base Excess: -5.1  /  SaO2: 100.0                     RADIOLOGY & ADDITIONAL TESTS:    Imaging Personally Reviewed:       Advance Directives:      Palliative Care:  Appropriate

## 2025-03-02 NOTE — CONSULT NOTE ADULT - CONSULT REASON
The patient is a 2 year old female who presents for well child exam.      CONCERNS:      Nursing notes reviewed and accepted.    MEDICATIONS:    No current outpatient medications on file.     No current facility-administered medications for this visit.       ALLERGIES:  ALLERGIES:  Patient has no known allergies.    PROBLEM LIST:  There are no problems to display for this patient.      REVIEW OF SYSTEMS:   Significant or recent illnesses:  None   HEENT / Dental issues:  None   Pain complaints:  None   Skin problems:  None   Cardiorespiratory:  negative  Gastrointestinal/Genitourinary:        Constipation:  no   Toilet-trained:  Pull up at night  Sleep:    Disturbances:  None    Naps:  None    Night:  9 hrs  Hearing/Vision issues:  None   Behavioral/Developmental concerns:  Stable   Significant injuries:  None   Surgeries/Hospitalizations:  None     DIET:  3 regular meals:  Yes  Milk:  2%   Normal appetite/intake/variety:   Does not like vegetables  Drinking water:  city and private well  Dentist:    No    SOCIAL:  /Education:  3 days per week  Pets:  4 cats 1 dog  Smoke exposure:  none  Lead risk:  Low   Tuberculosis risk:  Low     DEVELOPMENT:  [x]  YES    []  NO Hops on one foot  [x]  YES    []  NO Draws person, 3-6 parts  [x]  YES    []  NO Brushes teeth without help  [x]  YES    []  NO Pretend play  [x]  YES    []  NO Speech fully intelligible    PHYSICAL EXAM:  Blood pressure 94/54, pulse 96, temperature 98.1 °F (36.7 °C), resp. rate 26, height 3' 4\" (1.016 m), weight 18.1 kg (40 lb).  93 %ile (Z= 1.44) based on CDC (Girls, 2-20 Years) BMI-for-age based on BMI available as of 1/19/2022.  56 %ile (Z= 0.14) based on CDC (Girls, 2-20 Years) Stature-for-age data based on Stature recorded on 1/19/2022.  83 %ile (Z= 0.94) based on CDC (Girls, 2-20 Years) weight-for-age data using vitals from 1/19/2022.  Growth curve reviewed.  No exam data present  GENERAL:  Alert, nontoxic, no acute distress.    SKIN:  No 
rashes, pallor, or cyanosis.  HEAD:  Normocephalic, atraumatic.    EYES:  Conjunctivae without injection or drainage.  PERRL (Pupils equal, round, reactive to light), EOMI (extraocular movements intact)     Red reflex present, equal.  NOSE:  No nasal drainage.  EARS:  TMs (Tympanic membranes) clear bilaterally.  THROAT:  Mucous membranes moist.  No oropharyngeal lesions.  NECK:  No lymphadenopathy, masses or thyromegaly.  HEART:  Regular rate and rhythm.  Normal S1, S2.  No murmur  LUNGS:  Clear to auscultation bilaterally.  No wheezes, rales, rhonchi.  Normal work of breathing.  ABDOMEN:  Soft, nontender, nondistended.  No masses or hepatosplenomegaly.  GENITOURINARY:   Normal Torey 1  female.  EXTREMITIES:   Femoral pulses 2+ bilaterally.  No joint swelling.    BACK:   No deformity.  NEUROLOGIC:  Normal tone and strength.  Normal gait.      ASSESSMENT:  2 year old female well child.  Normal growth and development  Unvaccinated - per mom    PLAN:  All parental concerns and questions discussed, as above.  Anticipatory guidance provided as above.  Recommended dentist q 6 months  Labs:  None        Immunizations:  Mom declined all vaccines.    RTC (Return to clinic) for next WCE (well child exam) in 1 year.  
COPD Exacerbation
copd  resp distress

## 2025-03-02 NOTE — PROGRESS NOTE ADULT - ASSESSMENT
68 yo M with PMH of COPD/asthma (on 3L home O2), HLD and prediabetes presented to the ED with worsening shortness of breath, admitted to the ICU for acute hypoxic respiratory failure 2/2 COPD exacerbation.    #Acute  on chr   hypoxic respiratory failure 2/2 COPD exacerbation  #Asthma  - Admit to ICU  - Continue BiPAP  - Patient has declined intubation at this time; trial of Precedex with BiPAP per ICU  - Continue IV solumedrol 40 mg ivpb q8hr   - Continue Rocephin and d c Azithro  - Continue Duonebs, Budesonide nebs  - Follow up full RVP neg  , Strep  pending and Legionella antigens neg   - Rest of management per ICU    #Prediabetes  - Hold home metformin  - LDISS, accuchecks Q6h, hypoglycemia protocol  - Follow up A1c 6.6      - hx mild diastolic chf - on home Lasix / on hold , echo 59     #VTE ppx  - Per ICU 70 yo M with PMH of COPD/asthma (on 3L home O2), HLD and prediabetes presented to the ED with worsening shortness of breath, admitted to the ICU for acute hypoxic respiratory failure 2/2 COPD exacerbation.    #Acute  on chr   hypoxic respiratory failure 2/2 COPD exacerbation  #Asthma  - Admit to ICU  - Continue BiPAP  - Patient has declined intubation at this time; trial of Precedex with BiPAP per ICU  - Continue IV solumedrol 40 mg ivpb q8hr   - Continue Rocephin and dc Azithro  - Continue Duonebs, Budesonide nebs  - Follow up full RVP neg, Strep  pending and Legionella antigens neg   - Rest of management per ICU    #Prediabetes  - Hold home metformin  - LDISS, accuchecks Q6h, hypoglycemia protocol  - Follow up A1c 6.6      - Hx mild diastolic chf - on home Lasix / on hold , echo [id number]%       #VTE ppx  - Per ICU

## 2025-03-02 NOTE — DISCHARGE NOTE PROVIDER - NSDCMRMEDTOKEN_GEN_ALL_CORE_FT
Albuterol 200 PA HFA: 2 puffs BID PRN for bronchospasm  Atrovent HFA 17 mcg/inh inhalation aerosol: 2 puff(s) by metered dose inhaler 4 times a day  Lasix 20 mg oral tablet: 1 tab(s) orally once a day  metFORMIN 500 mg oral tablet: 1 tab(s) orally once a day  simvastatin 10 mg oral tablet: 1 tab(s) orally once a day (at bedtime)  Trelegy Ellipta 100 mcg-62.5 mcg-25 mcg/inh inhalation powder: 1 puff(s) inhaled once a day   Albuterol 200 PA HFA: 2 puffs BID PRN for bronchospasm  Atrovent HFA 17 mcg/inh inhalation aerosol: 2 puff(s) by metered dose inhaler 4 times a day  Lasix 20 mg oral tablet: 1 tab(s) orally once a day  metFORMIN 500 mg oral tablet: 1 tab(s) orally once a day  predniSONE 50 mg oral tablet: 1 tab(s) orally once a day  simvastatin 10 mg oral tablet: 1 tab(s) orally once a day (at bedtime)  sodium/potassium/phosphorus 160 mg-280 mg-250 mg oral powder for reconstitution: 1 packet(s) orally once  Trelegy Ellipta 100 mcg-62.5 mcg-25 mcg/inh inhalation powder: 1 puff(s) inhaled once a day   Albuterol 200 PA HFA: 2 puffs BID PRN for bronchospasm  Atrovent HFA 17 mcg/inh inhalation aerosol: 2 puff(s) by metered dose inhaler 4 times a day  cefpodoxime 200 mg oral tablet: 1 tab(s) orally 2 times a day Please take this medication TWO TIMES A DAY for the next THREE days.  Lasix 20 mg oral tablet: 1 tab(s) orally once a day  metFORMIN 500 mg oral tablet: 1 tab(s) orally once a day  predniSONE 50 mg oral tablet: 1 cap(s) orally once a day Please take one pill every day for the next four days to complete your steroid course.  simvastatin 10 mg oral tablet: 1 tab(s) orally once a day (at bedtime)  sodium/potassium/phosphorus 160 mg-280 mg-250 mg oral powder for reconstitution: 1 packet(s) orally once Please take one packet by mouth every day for the next three days  Trelegy Ellipta 100 mcg-62.5 mcg-25 mcg/inh inhalation powder: 1 puff(s) inhaled once a day

## 2025-03-02 NOTE — PROGRESS NOTE ADULT - ASSESSMENT
68 y/o Male with PMH of COPD (On 3L Home O2), HLD and DM presents to  ED complaining of shortness of breath with:    1. Acute Hypoxic Respiratory Failure   2. Acute COPD Exacerbation       Neuro: Transitioned off Precedex gtt. Mentation improved. Avoid delirogenic medications.   Resp: Profound tachypnea and hypoxia on arrival, requiring NIPPV. Respiratory status improving, transitioned to NC. C/W bronchodilators and inhaled steroids. Actively titrating FiO2 as tolerated. Encourage IS/OOB. High risk for rapid decompensation requiring emergent intubation. CTA inconclusive secondary to motion.   CV: Hemodynamically stable. TTE with EF 59% with no RWA. Monitor clinical and laboratory end points of perfusion, maintain MAP >65.   GI: Consistent carb diet. GI prophylaxis with Protonix.   Renal: Stable renal indices. Trend labs, replete lytes as needed to maintain K>3, Mg>2 and Phos>2. Avoid nephrotoxic medications.   ID: Leukocytosis, afebrile. RVP-. Legionella negative. Empiric coverage with Ceftriaxone. D/C Azithromycin. Trend WBC and fever curve.   Heme: Trend H/H, transfuse for hgb <7.0 if necessary. Trend H/H, transfuse for hgb <7.0 if necessary. DVT prophylaxis with Lovenox. SCDs in place.

## 2025-03-02 NOTE — PROGRESS NOTE ADULT - ATTENDING COMMENTS
70 yo man with Hx advanced COPD/asthma, chronic hypoxemic respiratory failure, preDM, recent COPD exacerbation and still on prednisone taper now presents with acute hypoxemic respiratory failure from likely COPD exacerbation, now much improved.    --continue precedex for anxiolysis  --hemodynamically stable, diastolic dysfunction, appears euvolemic  HLD, continue statin  --acute hypoxemic respiratory failure, COPD exacerbation  continue solumedrol 40 q8h, standing bronchodilators and inhaled steroids  --normal renal function  --start PO diet, PPI  --empiric CTX, d/c azithro as legionella neg  --preDM, check fs, ISS, check A1c

## 2025-03-02 NOTE — DISCHARGE NOTE PROVIDER - DETAILS OF MALNUTRITION DIAGNOSIS/DIAGNOSES
This patient has been assessed with a concern for Malnutrition and was treated during this hospitalization for the following Nutrition diagnosis/diagnoses:     -  03/03/2025: Moderate protein-calorie malnutrition

## 2025-03-02 NOTE — PROGRESS NOTE ADULT - SUBJECTIVE AND OBJECTIVE BOX
Patient is a 69y old  Male who presents with a chief complaint of Acute hypoxic respiratory failure, COPD exacerbation (02 Mar 2025 13:06)      INTERVAL HPI/OVERNIGHT EVENTS:     T(C): 36.9 (03-02-25 @ 08:48), Max: 37.2 (03-01-25 @ 20:23)  HR: 74 (03-02-25 @ 13:43) (66 - 88)  BP: 91/55 (03-02-25 @ 13:00) (77/61 - 132/81)  RR: 24 (03-02-25 @ 09:00) (15 - 33)  SpO2: 93% (03-02-25 @ 13:43) (90% - 100%)  Wt(kg): --  I&O's Summary    01 Mar 2025 07:01  -  02 Mar 2025 07:00  --------------------------------------------------------  IN: 1037.8 mL / OUT: 350 mL / NET: 687.8 mL    02 Mar 2025 07:01  -  02 Mar 2025 14:23  --------------------------------------------------------  IN: 959.7 mL / OUT: 650 mL / NET: 309.7 mL      PHYSICAL EXAM:  Physical Exam:   Gen: well appearing, eating lunch, appears stated age; [facility]  Neuro:  AAOX3  HEENT: NC/AT, EOMI.  Resp: Slight expiratory wheezes L lung base, otherwise CTA BL. No accessory m. use. Speaking in full sentences  CVS: +s1, s2. RRR  Abd: Soft NT ND, Bowel sounds present  Ext: No clubbing cyanosis or edema  Skin: WWP    MEDICATIONS  (STANDING):  albuterol/ipratropium for Nebulization 3 milliLiter(s) Nebulizer every 6 hours  atorvastatin 10 milliGRAM(s) Oral at bedtime  buDESOnide    Inhalation Suspension 0.5 milliGRAM(s) Inhalation every 12 hours  cefTRIAXone   IVPB 1000 milliGRAM(s) IV Intermittent every 24 hours  chlorhexidine 2% Cloths 1 Application(s) Topical <User Schedule>  dexMEDEtomidine Infusion 0.4 MICROgram(s)/kG/Hr (7.71 mL/Hr) IV Continuous <Continuous>  dextrose 5%. 1000 milliLiter(s) (50 mL/Hr) IV Continuous <Continuous>  dextrose 5%. 1000 milliLiter(s) (100 mL/Hr) IV Continuous <Continuous>  dextrose 50% Injectable 25 Gram(s) IV Push once  dextrose 50% Injectable 12.5 Gram(s) IV Push once  dextrose 50% Injectable 25 Gram(s) IV Push once  enoxaparin Injectable 40 milliGRAM(s) SubCutaneous every 24 hours  glucagon  Injectable 1 milliGRAM(s) IntraMuscular once  insulin lispro (ADMELOG) corrective regimen sliding scale   SubCutaneous three times a day before meals  insulin lispro (ADMELOG) corrective regimen sliding scale   SubCutaneous at bedtime  methylPREDNISolone sodium succinate Injectable 40 milliGRAM(s) IV Push every 8 hours  pantoprazole  Injectable 40 milliGRAM(s) IV Push daily  potassium phosphate / sodium phosphate Powder (PHOS-NaK) 1 Packet(s) Oral three times a day    MEDICATIONS  (PRN):  acetaminophen     Tablet .. 650 milliGRAM(s) Oral every 6 hours PRN Temp greater or equal to 38C (100.4F)  albuterol    90 MICROgram(s) HFA Inhaler 2 Puff(s) Inhalation every 2 hours PRN Shortness of Breath and/or Wheezing  dextrose Oral Gel 15 Gram(s) Oral once PRN Blood Glucose LESS THAN 70 milliGRAM(s)/deciliter      LABS:                        12.2   11.40 )-----------( 263      ( 02 Mar 2025 05:55 )             36.1     03-02    138  |  108  |  24[H]  ----------------------------<  247[H]  4.1   |  22  |  0.94    Ca    8.4[L]      02 Mar 2025 05:55  Phos  2.2     03-02  Mg     2.6     03-02    TPro  6.9  /  Alb  2.9[L]  /  TBili  0.3  /  DBili  x   /  AST  29  /  ALT  52  /  AlkPhos  70  03-02    PT/INR - ( 01 Mar 2025 07:45 )   PT: 13.3 sec;   INR: 1.13 ratio         PTT - ( 01 Mar 2025 07:45 )  PTT:28.1 sec  Urinalysis Basic - ( 02 Mar 2025 05:55 )    Color: x / Appearance: x / SG: x / pH: x  Gluc: 247 mg/dL / Ketone: x  / Bili: x / Urobili: x   Blood: x / Protein: x / Nitrite: x   Leuk Esterase: x / RBC: x / WBC x   Sq Epi: x / Non Sq Epi: x / Bacteria: x      CAPILLARY BLOOD GLUCOSE      POCT Blood Glucose.: 270 mg/dL (02 Mar 2025 11:15)  POCT Blood Glucose.: 211 mg/dL (02 Mar 2025 07:37)  POCT Blood Glucose.: 164 mg/dL (01 Mar 2025 21:43)  POCT Blood Glucose.: 209 mg/dL (01 Mar 2025 16:55)    ABG - ( 01 Mar 2025 10:02 )  pH, Arterial: 7.36  pH, Blood: x     /  pCO2: 36    /  pO2: 297   / HCO3: 20    / Base Excess: -5.1  /  SaO2: 100.0                     RADIOLOGY & ADDITIONAL TESTS:    Imaging Personally Reviewed:       Advance Directives:      Palliative Care:  Appropriate

## 2025-03-02 NOTE — PROGRESS NOTE ADULT - SUBJECTIVE AND OBJECTIVE BOX
Patient is a 69y old  Male who presents with a chief complaint of Acute hypoxic respiratory failure, COPD exacerbation (02 Mar 2025 18:20)      BRIEF HOSPITAL COURSE-  70 y/o Male with PMH of COPD (On 3L Home O2), HLD and DM presents to  ED complaining of shortness of breath. Admitted to ICU for acute hypoxic respiratory failure secondary to acute COPD exacerbation.    Events last 24 hours:   Transitioned off NIPPV.     Review of Systems:  CONSTITUTIONAL: No fever, chills, or fatigue.  EYES: No eye pain, visual disturbances, or discharge.  ENMT: No difficulty hearing, tinnitus, vertigo. No sinus or throat pain.  NECK: No pain or stiffness.  RESPIRATORY: +Shortness of breath. No cough, wheezing, chills or hemoptysis.   CARDIOVASCULAR: No chest pain, palpitations, dizziness, or leg swelling.  GASTROINTESTINAL: No abdominal or epigastric pain. No nausea, vomiting, or hematemesis. No diarrhea or constipation. No melena or hematochezia.  GENITOURINARY: No dysuria, frequency, hematuria, or incontinence.  NEUROLOGICAL: No headaches, memory loss, loss of strength, numbness, or tremors.  SKIN: No itching, burning, rashes, or lesions.  MUSCULOSKELETAL: No joint pain or swelling. No muscle, back, or extremity pain.  PSYCHIATRIC: No depression, anxiety, mood swings, or difficulty sleeping.      PAST MEDICAL & SURGICAL HISTORY-  COPD, severe      Asthma      HLD (hyperlipidemia)      Prediabetes      S/P cataract surgery  R eye 6/6/24, L eye 6/20          Medications:  cefTRIAXone   IVPB 1000 milliGRAM(s) IV Intermittent every 24 hours      albuterol    90 MICROgram(s) HFA Inhaler 2 Puff(s) Inhalation every 2 hours PRN  albuterol/ipratropium for Nebulization 3 milliLiter(s) Nebulizer every 6 hours  buDESOnide    Inhalation Suspension 0.5 milliGRAM(s) Inhalation every 12 hours    acetaminophen     Tablet .. 650 milliGRAM(s) Oral every 6 hours PRN  dexMEDEtomidine Infusion 0.4 MICROgram(s)/kG/Hr IV Continuous <Continuous>      enoxaparin Injectable 40 milliGRAM(s) SubCutaneous every 24 hours    pantoprazole  Injectable 40 milliGRAM(s) IV Push daily      atorvastatin 10 milliGRAM(s) Oral at bedtime  dextrose 50% Injectable 25 Gram(s) IV Push once  dextrose 50% Injectable 12.5 Gram(s) IV Push once  dextrose 50% Injectable 25 Gram(s) IV Push once  dextrose Oral Gel 15 Gram(s) Oral once PRN  glucagon  Injectable 1 milliGRAM(s) IntraMuscular once  insulin lispro (ADMELOG) corrective regimen sliding scale   SubCutaneous three times a day before meals  insulin lispro (ADMELOG) corrective regimen sliding scale   SubCutaneous at bedtime  methylPREDNISolone sodium succinate Injectable 40 milliGRAM(s) IV Push every 8 hours    dextrose 5%. 1000 milliLiter(s) IV Continuous <Continuous>  dextrose 5%. 1000 milliLiter(s) IV Continuous <Continuous>  potassium phosphate / sodium phosphate Powder (PHOS-NaK) 1 Packet(s) Oral three times a day      chlorhexidine 2% Cloths 1 Application(s) Topical <User Schedule>            ICU Vital Signs Last 24 Hrs  T(C): 37 (02 Mar 2025 23:41), Max: 37 (02 Mar 2025 23:41)  T(F): 98.6 (02 Mar 2025 23:41), Max: 98.6 (02 Mar 2025 23:41)  HR: 70 (03 Mar 2025 00:00) (66 - 89)  BP: 115/69 (03 Mar 2025 00:00) (77/61 - 154/92)  BP(mean): 83 (03 Mar 2025 00:00) (65 - 92)  ABP: --  ABP(mean): --  RR: 21 (03 Mar 2025 00:00) (18 - 28)  SpO2: 94% (03 Mar 2025 00:00) (93% - 100%)    O2 Parameters below as of 03 Mar 2025 00:00  Patient On (Oxygen Delivery Method): nasal cannula  O2 Flow (L/min): 4          ABG - ( 01 Mar 2025 10:02 )  pH, Arterial: 7.36  pH, Blood: x     /  pCO2: 36    /  pO2: 297   / HCO3: 20    / Base Excess: -5.1  /  SaO2: 100.0               I&O's Detail    01 Mar 2025 07:01  -  02 Mar 2025 07:00  --------------------------------------------------------  IN:    Dexmedetomidine: 187.8 mL    IV PiggyBack: 50 mL    IV PiggyBack: 250 mL    Oral Fluid: 550 mL  Total IN: 1037.8 mL    OUT:    Voided (mL): 350 mL  Total OUT: 350 mL    Total NET: 687.8 mL      02 Mar 2025 07:01  -  03 Mar 2025 00:21  --------------------------------------------------------  IN:    Dexmedetomidine: 95.9 mL    IV PiggyBack: 250 mL    IV PiggyBack: 50 mL    Oral Fluid: 600 mL  Total IN: 995.9 mL    OUT:    Voided (mL): 1100 mL  Total OUT: 1100 mL    Total NET: -104.1 mL          LABS:                        12.2   11.40 )-----------( 263      ( 02 Mar 2025 05:55 )             36.1     03-02    138  |  108  |  24[H]  ----------------------------<  247[H]  4.1   |  22  |  0.94    Ca    8.4[L]      02 Mar 2025 05:55  Phos  2.2     03-02  Mg     2.6     03-02    TPro  6.9  /  Alb  2.9[L]  /  TBili  0.3  /  DBili  x   /  AST  29  /  ALT  52  /  AlkPhos  70  03-02          CAPILLARY BLOOD GLUCOSE  POCT Blood Glucose.: 202 mg/dL (02 Mar 2025 21:32)      PT/INR - ( 01 Mar 2025 07:45 )   PT: 13.3 sec;   INR: 1.13 ratio    PTT - ( 01 Mar 2025 07:45 )  PTT:28.1 sec      Urinalysis Basic - ( 02 Mar 2025 05:55 )  Color: x / Appearance: x / SG: x / pH: x  Gluc: 247 mg/dL / Ketone: x  / Bili: x / Urobili: x   Blood: x / Protein: x / Nitrite: x   Leuk Esterase: x / RBC: x / WBC x   Sq Epi: x / Non Sq Epi: x / Bacteria: x      CULTURES:  Culture Results:   No growth at 24 hours (03-01 @ 07:45)  Culture Results:   No growth at 24 hours (03-01 @ 07:40)      Physical Examination:  General: No acute distress.    HEENT: Pupils equal, reactive to light.  Symmetric.  PULM: Clear to auscultation bilaterally, no significant sputum production  NECK: Supple, no lymphadenopathy, trachea midline  CVS: Regular rate and rhythm, no murmurs, rubs, or gallops  ABD: Soft, nondistended, nontender, normoactive bowel sounds, no masses  EXT: No edema, nontender  SKIN: Warm and well perfused, no rashes noted.  NEURO: Alert, oriented, interactive, nonfocal  DEVICES:     RADIOLOGY: ***    CRITICAL CARE TIME SPENT: ** minutes assessing presenting problems of acute illness, which pose high probability of life threatening deterioration or end organ damage/dysfunction, as well as medical decision making including initiating plan of care, reviewing data, reviewing radiologic exams, discussing with multidisciplinary team,  discussing goals of care with patient/family, and writing this note.  Non-inclusive of procedures performed,      DATE OF ENTRY OF THIS NOTE IS EQUAL TO THE DATE OF SERVICES RENDERED Patient is a 69y old  Male who presents with a chief complaint of Acute hypoxic respiratory failure, COPD exacerbation (02 Mar 2025 18:20)      BRIEF HOSPITAL COURSE-  68 y/o Male with PMH of COPD (On 3L Home O2), HLD and DM presents to  ED complaining of shortness of breath. Admitted to ICU for acute hypoxic respiratory failure secondary to acute COPD exacerbation.    Events last 24 hours:   Transitioned off NIPPV. Tachypnea improved.       Review of Systems:  CONSTITUTIONAL: No fever, chills, or fatigue.  EYES: No eye pain, visual disturbances, or discharge.  ENMT: No difficulty hearing, tinnitus, vertigo. No sinus or throat pain.  NECK: No pain or stiffness.  RESPIRATORY: +Shortness of breath. No cough, wheezing, chills or hemoptysis.   CARDIOVASCULAR: No chest pain, palpitations, dizziness, or leg swelling.  GASTROINTESTINAL: No abdominal or epigastric pain. No nausea, vomiting, or hematemesis. No diarrhea or constipation. No melena or hematochezia.  GENITOURINARY: No dysuria, frequency, hematuria, or incontinence.  NEUROLOGICAL: No headaches, memory loss, loss of strength, numbness, or tremors.  SKIN: No itching, burning, rashes, or lesions.  MUSCULOSKELETAL: No joint pain or swelling. No muscle, back, or extremity pain.  PSYCHIATRIC: No depression, anxiety, mood swings, or difficulty sleeping.      PAST MEDICAL & SURGICAL HISTORY-  COPD, severe      Asthma      HLD (hyperlipidemia)      Prediabetes      S/P cataract surgery  R eye 6/6/24, L eye 6/20          Medications:  cefTRIAXone   IVPB 1000 milliGRAM(s) IV Intermittent every 24 hours    albuterol    90 MICROgram(s) HFA Inhaler 2 Puff(s) Inhalation every 2 hours PRN  albuterol/ipratropium for Nebulization 3 milliLiter(s) Nebulizer every 6 hours  buDESOnide    Inhalation Suspension 0.5 milliGRAM(s) Inhalation every 12 hours    acetaminophen     Tablet .. 650 milliGRAM(s) Oral every 6 hours PRN  dexMEDEtomidine Infusion 0.4 MICROgram(s)/kG/Hr IV Continuous <Continuous>    enoxaparin Injectable 40 milliGRAM(s) SubCutaneous every 24 hours    pantoprazole  Injectable 40 milliGRAM(s) IV Push daily    atorvastatin 10 milliGRAM(s) Oral at bedtime  dextrose 50% Injectable 25 Gram(s) IV Push once  dextrose 50% Injectable 12.5 Gram(s) IV Push once  dextrose 50% Injectable 25 Gram(s) IV Push once  dextrose Oral Gel 15 Gram(s) Oral once PRN  glucagon  Injectable 1 milliGRAM(s) IntraMuscular once  insulin lispro (ADMELOG) corrective regimen sliding scale   SubCutaneous three times a day before meals  insulin lispro (ADMELOG) corrective regimen sliding scale   SubCutaneous at bedtime  methylPREDNISolone sodium succinate Injectable 40 milliGRAM(s) IV Push every 8 hours    dextrose 5%. 1000 milliLiter(s) IV Continuous <Continuous>  dextrose 5%. 1000 milliLiter(s) IV Continuous <Continuous>  potassium phosphate / sodium phosphate Powder (PHOS-NaK) 1 Packet(s) Oral three times a day    chlorhexidine 2% Cloths 1 Application(s) Topical <User Schedule>        ICU Vital Signs Last 24 Hrs  T(C): 37 (02 Mar 2025 23:41), Max: 37 (02 Mar 2025 23:41)  T(F): 98.6 (02 Mar 2025 23:41), Max: 98.6 (02 Mar 2025 23:41)  HR: 70 (03 Mar 2025 00:00) (66 - 89)  BP: 115/69 (03 Mar 2025 00:00) (77/61 - 154/92)  BP(mean): 83 (03 Mar 2025 00:00) (65 - 92)  ABP: --  ABP(mean): --  RR: 21 (03 Mar 2025 00:00) (18 - 28)  SpO2: 94% (03 Mar 2025 00:00) (93% - 100%)    O2 Parameters below as of 03 Mar 2025 00:00  Patient On (Oxygen Delivery Method): nasal cannula  O2 Flow (L/min): 4        ABG - ( 01 Mar 2025 10:02 )  pH, Arterial: 7.36  pH, Blood: x     /  pCO2: 36    /  pO2: 297   / HCO3: 20    / Base Excess: -5.1  /  SaO2: 100.0         I&O's Detail    01 Mar 2025 07:01  -  02 Mar 2025 07:00  --------------------------------------------------------  IN:    Dexmedetomidine: 187.8 mL    IV PiggyBack: 50 mL    IV PiggyBack: 250 mL    Oral Fluid: 550 mL  Total IN: 1037.8 mL    OUT:    Voided (mL): 350 mL  Total OUT: 350 mL    Total NET: 687.8 mL      02 Mar 2025 07:01  -  03 Mar 2025 00:21  --------------------------------------------------------  IN:    Dexmedetomidine: 95.9 mL    IV PiggyBack: 250 mL    IV PiggyBack: 50 mL    Oral Fluid: 600 mL  Total IN: 995.9 mL    OUT:    Voided (mL): 1100 mL  Total OUT: 1100 mL    Total NET: -104.1 mL        LABS:                        12.2   11.40 )-----------( 263      ( 02 Mar 2025 05:55 )             36.1     138  |  108  |  24[H]  ----------------------------<  247[H]  4.1   |  22  |  0.94    Ca    8.4[L]      02 Mar 2025 05:55  Phos  2.2     03-02  Mg     2.6     03-02    TPro  6.9  /  Alb  2.9[L]  /  TBili  0.3  /  DBili  x   /  AST  29  /  ALT  52  /  AlkPhos  70  03-02        CAPILLARY BLOOD GLUCOSE  POCT Blood Glucose.: 202 mg/dL (02 Mar 2025 21:32)        PT/INR - ( 01 Mar 2025 07:45 )   PT: 13.3 sec;   INR: 1.13 ratio    PTT - ( 01 Mar 2025 07:45 )  PTT:28.1 sec        Urinalysis Basic - ( 02 Mar 2025 05:55 )  Color: x / Appearance: x / SG: x / pH: x  Gluc: 247 mg/dL / Ketone: x  / Bili: x / Urobili: x   Blood: x / Protein: x / Nitrite: x   Leuk Esterase: x / RBC: x / WBC x   Sq Epi: x / Non Sq Epi: x / Bacteria: x      CULTURES:  Culture Results:   No growth at 24 hours (03-01 @ 07:45)  Culture Results:   No growth at 24 hours (03-01 @ 07:40)        Physical Examination:  General: Elderly male, chronically ill appearing. In moderate distress.  HEENT: Pupils equal, reactive to light.  Symmetric.  PULM: +Clubbing. Course/ diminished to auscultation bilaterally. Tachypneic, with moderate accessory muscle use. No significant sputum production.  NECK: Supple, no lymphadenopathy, trachea midline.  CVS: Regular rate and rhythm. No murmurs, rubs, or gallops. S1, S2 intact.   ABD: Soft, distended, nontender, normoactive bowel sounds. No masses palpable.   EXT: No edema. Nontender.  SKIN: Warm and well perfused, no rashes noted.  NEURO: Alert and oriented. Answering questions. Nonfocal.       RADIOLOGY-    < from: CT Angio Chest PE Protocol w/ IV Cont (03.02.25 @ 08:52) >  ACC: [id number] EXAM:  CT ANGIO CHEST PULM ART [facility]IC   ORDERED BY: BERNA ALCARAZ     PROCEDURE DATE:  03/02/2025      INTERPRETATION:  INDICATION, CLINICAL INFORMATION: Shortness of breath  TECHNIQUE: CT pulmonary angiogram of the chest . Coronal, sagittal and   3-D/MIP images were reconstructed/reviewed.  CONTRAST/COMPLICATIONS:  IV Contrast: Omnipaque 350  82 cc administered   18 cc discarded    COMPARISON: 8/10/2024 chest CT.    FINDINGS: The quality of the images are degraded by motion.    PULMONARY VESSELS: Nondiagnostic exam for evaluation of pulmonary embolus   secondary to severe motion.    HEART AND VASCULATURE: Heart size is normal. No pericardial effusion. No   aortic aneurysm or dissection. Coronary calcification.    LUNGS, AIRWAYS, PLEURA: Patent central airways. Emphysema. New 6 mm left   apical nodule. The lungs are otherwise clear. No pleural effusions or   pneumothorax.    MEDIASTINUM: No mass or lymphadenopathy.    UPPER ABDOMEN: Within normal limits.    BONES AND SOFT TISSUES: Within normal limits.    LOWER NECK: Within normal limits.    IMPRESSION:    Nondiagnostic exam for evaluation of pulmonary embolus secondary to   severe motion.    Emphysema. New 6 mm left apical nodule. Recommend follow-up chest CT in 3   months to to determine stability.    --- End of Report ---    RITESH TAI MD; Attending Radiologist  This document has been electronically signed. Mar  2 2025  9:58AM    < end of copied text >        DATE OF ENTRY OF THIS NOTE IS EQUAL TO THE DATE OF SERVICES RENDERED Patient is a 69y old  Male who presents with a chief complaint of Acute hypoxic respiratory failure, COPD exacerbation (02 Mar 2025 18:20)      BRIEF HOSPITAL COURSE-  68 y/o Male with PMH of COPD (On 3L Home O2), HLD and DM presents to  ED complaining of shortness of breath. Admitted to ICU for acute hypoxic respiratory failure secondary to acute COPD exacerbation.    Events last 24 hours:   Transitioned off NIPPV. Tachypnea improved.       Review of Systems:  CONSTITUTIONAL: No fever, chills, or fatigue.  EYES: No eye pain, visual disturbances, or discharge.  ENMT: No difficulty hearing, tinnitus, vertigo. No sinus or throat pain.  NECK: No pain or stiffness.  RESPIRATORY: +Shortness of breath. No cough, wheezing, chills or hemoptysis.   CARDIOVASCULAR: No chest pain, palpitations, dizziness, or leg swelling.  GASTROINTESTINAL: No abdominal or epigastric pain. No nausea, vomiting, or hematemesis. No diarrhea or constipation. No melena or hematochezia.  GENITOURINARY: No dysuria, frequency, hematuria, or incontinence.  NEUROLOGICAL: No headaches, memory loss, loss of strength, numbness, or tremors.  SKIN: No itching, burning, rashes, or lesions.  MUSCULOSKELETAL: No joint pain or swelling. No muscle, back, or extremity pain.  PSYCHIATRIC: No depression, anxiety, mood swings, or difficulty sleeping.      PAST MEDICAL & SURGICAL HISTORY-  COPD, severe      Asthma      HLD (hyperlipidemia)      Prediabetes      S/P cataract surgery  R eye 6/6/24, L eye 6/20          Medications:  cefTRIAXone   IVPB 1000 milliGRAM(s) IV Intermittent every 24 hours    albuterol    90 MICROgram(s) HFA Inhaler 2 Puff(s) Inhalation every 2 hours PRN  albuterol/ipratropium for Nebulization 3 milliLiter(s) Nebulizer every 6 hours  buDESOnide    Inhalation Suspension 0.5 milliGRAM(s) Inhalation every 12 hours    acetaminophen     Tablet .. 650 milliGRAM(s) Oral every 6 hours PRN  dexMEDEtomidine Infusion 0.4 MICROgram(s)/kG/Hr IV Continuous <Continuous>    enoxaparin Injectable 40 milliGRAM(s) SubCutaneous every 24 hours    pantoprazole  Injectable 40 milliGRAM(s) IV Push daily    atorvastatin 10 milliGRAM(s) Oral at bedtime  dextrose 50% Injectable 25 Gram(s) IV Push once  dextrose 50% Injectable 12.5 Gram(s) IV Push once  dextrose 50% Injectable 25 Gram(s) IV Push once  dextrose Oral Gel 15 Gram(s) Oral once PRN  glucagon  Injectable 1 milliGRAM(s) IntraMuscular once  insulin lispro (ADMELOG) corrective regimen sliding scale   SubCutaneous three times a day before meals  insulin lispro (ADMELOG) corrective regimen sliding scale   SubCutaneous at bedtime  methylPREDNISolone sodium succinate Injectable 40 milliGRAM(s) IV Push every 8 hours    dextrose 5%. 1000 milliLiter(s) IV Continuous <Continuous>  dextrose 5%. 1000 milliLiter(s) IV Continuous <Continuous>  potassium phosphate / sodium phosphate Powder (PHOS-NaK) 1 Packet(s) Oral three times a day    chlorhexidine 2% Cloths 1 Application(s) Topical <User Schedule>        ICU Vital Signs Last 24 Hrs  T(C): 37 (02 Mar 2025 23:41), Max: 37 (02 Mar 2025 23:41)  T(F): 98.6 (02 Mar 2025 23:41), Max: 98.6 (02 Mar 2025 23:41)  HR: 70 (03 Mar 2025 00:00) (66 - 89)  BP: 115/69 (03 Mar 2025 00:00) (77/61 - 154/92)  BP(mean): 83 (03 Mar 2025 00:00) (65 - 92)  ABP: --  ABP(mean): --  RR: 21 (03 Mar 2025 00:00) (18 - 28)  SpO2: 94% (03 Mar 2025 00:00) (93% - 100%)    O2 Parameters below as of 03 Mar 2025 00:00  Patient On (Oxygen Delivery Method): nasal cannula  O2 Flow (L/min): 4        ABG - ( 01 Mar 2025 10:02 )  pH, Arterial: 7.36  pH, Blood: x     /  pCO2: 36    /  pO2: 297   / HCO3: 20    / Base Excess: -5.1  /  SaO2: 100.0         I&O's Detail    01 Mar 2025 07:01  -  02 Mar 2025 07:00  --------------------------------------------------------  IN:    Dexmedetomidine: 187.8 mL    IV PiggyBack: 50 mL    IV PiggyBack: 250 mL    Oral Fluid: 550 mL  Total IN: 1037.8 mL    OUT:    Voided (mL): 350 mL  Total OUT: 350 mL    Total NET: 687.8 mL      02 Mar 2025 07:01  -  03 Mar 2025 00:21  --------------------------------------------------------  IN:    Dexmedetomidine: 95.9 mL    IV PiggyBack: 250 mL    IV PiggyBack: 50 mL    Oral Fluid: 600 mL  Total IN: 995.9 mL    OUT:    Voided (mL): 1100 mL  Total OUT: 1100 mL    Total NET: -104.1 mL        LABS:                        12.2   11.40 )-----------( 263      ( 02 Mar 2025 05:55 )             36.1     138  |  108  |  24[H]  ----------------------------<  247[H]  4.1   |  22  |  0.94    Ca    8.4[L]      02 Mar 2025 05:55  Phos  2.2     03-02  Mg     2.6     03-02    TPro  6.9  /  Alb  2.9[L]  /  TBili  0.3  /  DBili  x   /  AST  29  /  ALT  52  /  AlkPhos  70  03-02        CAPILLARY BLOOD GLUCOSE  POCT Blood Glucose.: 202 mg/dL (02 Mar 2025 21:32)        PT/INR - ( 01 Mar 2025 07:45 )   PT: 13.3 sec;   INR: 1.13 ratio    PTT - ( 01 Mar 2025 07:45 )  PTT:28.1 sec        Urinalysis Basic - ( 02 Mar 2025 05:55 )  Color: x / Appearance: x / SG: x / pH: x  Gluc: 247 mg/dL / Ketone: x  / Bili: x / Urobili: x   Blood: x / Protein: x / Nitrite: x   Leuk Esterase: x / RBC: x / WBC x   Sq Epi: x / Non Sq Epi: x / Bacteria: x      CULTURES:  Culture Results:   No growth at 24 hours (03-01 @ 07:45)  Culture Results:   No growth at 24 hours (03-01 @ 07:40)        Physical Examination:  General: Elderly male, chronically ill appearing. In moderate distress.  HEENT: Pupils equal, reactive to light.  Symmetric.  PULM: +Clubbing. Course/ diminished to auscultation bilaterally. Tachypneic, with moderate accessory muscle use. No significant sputum production.  NECK: Supple, no lymphadenopathy, trachea midline.  CVS: Regular rate and rhythm. No murmurs, rubs, or gallops. S1, S2 intact.   ABD: Soft, distended, nontender, normoactive bowel sounds. No masses palpable.   EXT: No edema. Nontender.  SKIN: Warm and well perfused, no rashes noted.  NEURO: Alert and oriented. Answering questions. Nonfocal.       RADIOLOGY-    < from: CT Angio Chest PE Protocol w/ IV Cont (03.02.25 @ 08:52) >  ACC: [id number] EXAM:  CT ANGIO CHEST PULM ART [facility]AWI   ORDERED BY: BERNA ALCARAZ     PROCEDURE DATE:  03/02/2025      INTERPRETATION:  INDICATION, CLINICAL INFORMATION: Shortness of breath  TECHNIQUE: CT pulmonary angiogram of the chest . Coronal, sagittal and   3-D/MIP images were reconstructed/reviewed.  CONTRAST/COMPLICATIONS:  IV Contrast: Omnipaque 350  82 cc administered   18 cc discarded    COMPARISON: 8/10/2024 chest CT.    FINDINGS: The quality of the images are degraded by motion.    PULMONARY VESSELS: Nondiagnostic exam for evaluation of pulmonary embolus   secondary to severe motion.    HEART AND VASCULATURE: Heart size is normal. No pericardial effusion. No   aortic aneurysm or dissection. Coronary calcification.    LUNGS, AIRWAYS, PLEURA: Patent central airways. Emphysema. New 6 mm left   apical nodule. The lungs are otherwise clear. No pleural effusions or   pneumothorax.    MEDIASTINUM: No mass or lymphadenopathy.    UPPER ABDOMEN: Within normal limits.    BONES AND SOFT TISSUES: Within normal limits.    LOWER NECK: Within normal limits.    IMPRESSION:    Nondiagnostic exam for evaluation of pulmonary embolus secondary to   severe motion.    Emphysema. New 6 mm left apical nodule. Recommend follow-up chest CT in 3   months to to determine stability.    --- End of Report ---    RITESH TAI MD; Attending Radiologist  This document has been electronically signed. Mar  2 2025  9:58AM    < end of copied text >      Time spent on this patient encounter, which includes documenting this note in the electronic medical record, was 75+ minutes including assessing the presenting problems with associated risks, reviewing the medical record to prepare for the encounter, and meeting face to face with the patient to obtain additional history.  I have also performed an appropriate physical exam, made interventions listed and ordered and interpreted appropriate diagnostic studies as documented.     To improve communication and patient safety, I have coordinated care with the multidisciplinary team including the bedside nurse, appropriate attending of record and consultants as needed.      DATE OF ENTRY OF THIS NOTE IS EQUAL TO THE DATE OF SERVICES RENDERED

## 2025-03-02 NOTE — PROGRESS NOTE ADULT - SUBJECTIVE AND OBJECTIVE BOX
Interval Events: Pt seen and examined at bedside. Pt is eating lunch OOB to chair. His respiratory status and work of breathing has vastly improved. No active concerns.     Review of Systems:  Constitutional: No fever, chills, fatigue  Neuro: No headache, numbness, weakness  Resp: No cough, wheezing, shortness of breath  CVS: No chest pain, palpitations, leg swelling  GI: No abdominal pain, nausea, vomiting, diarrhea   : No dysuria, frequency, incontinence  Skin: No itching, burning, rashes, or lesions   Msk: No joint pain or swelling  Psych: No depression, anxiety, mood swings    ICU Vital Signs Last 24 Hrs  T(C): 36.9 (02 Mar 2025 08:48), Max: 37.2 (01 Mar 2025 20:23)  T(F): 98.4 (02 Mar 2025 08:48), Max: 98.9 (01 Mar 2025 20:23)  HR: 86 (02 Mar 2025 11:00) (66 - 88)  BP: 77/61 (02 Mar 2025 11:00) (77/61 - 132/81)  BP(mean): 68 (02 Mar 2025 11:00) (65 - 92)  ABP: --  ABP(mean): --  RR: 24 (02 Mar 2025 09:00) (15 - 33)  SpO2: 96% (02 Mar 2025 09:00) (90% - 100%)    O2 Parameters below as of 02 Mar 2025 09:00  Patient On (Oxygen Delivery Method): nasal cannula  O2 Flow (L/min): 4            03-01-25 @ 07:01  -  03-02-25 @ 07:00  --------------------------------------------------------  IN: 1037.8 mL / OUT: 350 mL / NET: 687.8 mL    03-02-25 @ 07:01  -  03-02-25 @ 13:07  --------------------------------------------------------  IN: 433.7 mL / OUT: 350 mL / NET: 83.7 mL        CAPILLARY BLOOD GLUCOSE      POCT Blood Glucose.: 270 mg/dL (02 Mar 2025 11:15)      I&O's Summary    01 Mar 2025 07:01  -  02 Mar 2025 07:00  --------------------------------------------------------  IN: 1037.8 mL / OUT: 350 mL / NET: 687.8 mL    02 Mar 2025 07:01  -  02 Mar 2025 13:07  --------------------------------------------------------  IN: 433.7 mL / OUT: 350 mL / NET: 83.7 mL        Physical Exam:   Gen: well appearing, eating lunch, appears stated age; [place]  Neuro:  AAOX3  HEENT: NC/AT, EOMI.  Resp: Slight expiratory wheezes L lung base, otherwise CTA BL. No accessory m. use. Speaking in full sentences  CVS: +s1, s2. RRR  Abd: Soft NT ND, Bowel sounds present  Ext: No clubbing cyanosis or edema  Skin: WWP    Meds:  cefTRIAXone   IVPB IV Intermittent      atorvastatin Oral  dextrose 50% Injectable IV Push  dextrose 50% Injectable IV Push  dextrose 50% Injectable IV Push  dextrose Oral Gel Oral PRN  glucagon  Injectable IntraMuscular  insulin lispro (ADMELOG) corrective regimen sliding scale SubCutaneous  insulin lispro (ADMELOG) corrective regimen sliding scale SubCutaneous  methylPREDNISolone sodium succinate Injectable IV Push    albuterol    90 MICROgram(s) HFA Inhaler Inhalation PRN  albuterol/ipratropium for Nebulization Nebulizer  buDESOnide    Inhalation Suspension Inhalation    acetaminophen     Tablet .. Oral PRN  dexMEDEtomidine Infusion IV Continuous      enoxaparin Injectable SubCutaneous    pantoprazole  Injectable IV Push      dextrose 5%. IV Continuous  dextrose 5%. IV Continuous  potassium phosphate / sodium phosphate Powder (PHOS-NaK) Oral      chlorhexidine 2% Cloths Topical                              12.2   11.40 )-----------( 263      ( 02 Mar 2025 05:55 )             36.1       03-02    138  |  108  |  24[H]  ----------------------------<  247[H]  4.1   |  22  |  0.94    Ca    8.4[L]      02 Mar 2025 05:55  Phos  2.2     03-02  Mg     2.6     03-02    TPro  6.9  /  Alb  2.9[L]  /  TBili  0.3  /  DBili  x   /  AST  29  /  ALT  52  /  AlkPhos  70  03-02          PT/INR - ( 01 Mar 2025 07:45 )   PT: 13.3 sec;   INR: 1.13 ratio         PTT - ( 01 Mar 2025 07:45 )  PTT:28.1 sec  Urinalysis Basic - ( 02 Mar 2025 05:55 )    Color: x / Appearance: x / SG: x / pH: x  Gluc: 247 mg/dL / Ketone: x  / Bili: x / Urobili: x   Blood: x / Protein: x / Nitrite: x   Leuk Esterase: x / RBC: x / WBC x   Sq Epi: x / Non Sq Epi: x / Bacteria: x              Base Excess, Arterial: -5.1 mmol/L *L* (03-01-25 @ 10:02)  HCO3, Arterial: 20 mmol/L *L* (03-01-25 @ 10:02)  pCO2, Arterial: 36 mmHg (03-01-25 @ 10:02)  pH, Arterial: 7.36 (03-01-25 @ 10:02)  pO2, Arterial: 297 mmHg *H* (03-01-25 @ 10:02)          Radiology:        Bedside Ultrasound:    Tubes/Lines:      GLOBAL ISSUE/BEST PRACTICE:  Analgesia:n  Sedation:n  HOB elevation: Y  Stress ulcer prophylaxis:y  VTE prophylaxis:y  Glycemic control:y  Nutrition:y    CODE STATUS:        Interval Events: Pt seen and examined at bedside. Pt is eating lunch OOB to chair. His respiratory status and work of breathing has vastly improved. No active concerns.         ICU Vital Signs Last 24 Hrs  T(C): 36.9 (02 Mar 2025 08:48), Max: 37.2 (01 Mar 2025 20:23)  T(F): 98.4 (02 Mar 2025 08:48), Max: 98.9 (01 Mar 2025 20:23)  HR: 86 (02 Mar 2025 11:00) (66 - 88)  BP: 77/61 (02 Mar 2025 11:00) (77/61 - 132/81)  BP(mean): 68 (02 Mar 2025 11:00) (65 - 92)  ABP: --  ABP(mean): --  RR: 24 (02 Mar 2025 09:00) (15 - 33)  SpO2: 96% (02 Mar 2025 09:00) (90% - 100%)    O2 Parameters below as of 02 Mar 2025 09:00  Patient On (Oxygen Delivery Method): nasal cannula  O2 Flow (L/min): 4            03-01-25 @ 07:01  -  03-02-25 @ 07:00  --------------------------------------------------------  IN: 1037.8 mL / OUT: 350 mL / NET: 687.8 mL    03-02-25 @ 07:01  -  03-02-25 @ 13:07  --------------------------------------------------------  IN: 433.7 mL / OUT: 350 mL / NET: 83.7 mL        CAPILLARY BLOOD GLUCOSE      POCT Blood Glucose.: 270 mg/dL (02 Mar 2025 11:15)      I&O's Summary    01 Mar 2025 07:01  -  02 Mar 2025 07:00  --------------------------------------------------------  IN: 1037.8 mL / OUT: 350 mL / NET: 687.8 mL    02 Mar 2025 07:01  -  02 Mar 2025 13:07  --------------------------------------------------------  IN: 433.7 mL / OUT: 350 mL / NET: 83.7 mL        Physical Exam:   Gen: well appearing, eating lunch, appears stated age; [place]  Neuro:  AAOX3  HEENT: NC/AT, EOMI.  Resp: Slight expiratory wheezes L lung base, otherwise CTA BL. No accessory m. use. Speaking in full sentences  CVS: +s1, s2. RRR  Abd: Soft NT ND, Bowel sounds present  Ext: No clubbing cyanosis or edema  Skin: WWP    Meds:  cefTRIAXone   IVPB IV Intermittent      atorvastatin Oral  dextrose 50% Injectable IV Push  dextrose 50% Injectable IV Push  dextrose 50% Injectable IV Push  dextrose Oral Gel Oral PRN  glucagon  Injectable IntraMuscular  insulin lispro (ADMELOG) corrective regimen sliding scale SubCutaneous  insulin lispro (ADMELOG) corrective regimen sliding scale SubCutaneous  methylPREDNISolone sodium succinate Injectable IV Push    albuterol    90 MICROgram(s) HFA Inhaler Inhalation PRN  albuterol/ipratropium for Nebulization Nebulizer  buDESOnide    Inhalation Suspension Inhalation    acetaminophen     Tablet .. Oral PRN  dexMEDEtomidine Infusion IV Continuous      enoxaparin Injectable SubCutaneous    pantoprazole  Injectable IV Push      dextrose 5%. IV Continuous  dextrose 5%. IV Continuous  potassium phosphate / sodium phosphate Powder (PHOS-NaK) Oral      chlorhexidine 2% Cloths Topical                              12.2   11.40 )-----------( 263      ( 02 Mar 2025 05:55 )             36.1       03-02    138  |  108  |  24[H]  ----------------------------<  247[H]  4.1   |  22  |  0.94    Ca    8.4[L]      02 Mar 2025 05:55  Phos  2.2     03-02  Mg     2.6     03-02    TPro  6.9  /  Alb  2.9[L]  /  TBili  0.3  /  DBili  x   /  AST  29  /  ALT  52  /  AlkPhos  70  03-02          PT/INR - ( 01 Mar 2025 07:45 )   PT: 13.3 sec;   INR: 1.13 ratio         PTT - ( 01 Mar 2025 07:45 )  PTT:28.1 sec  Urinalysis Basic - ( 02 Mar 2025 05:55 )    Color: x / Appearance: x / SG: x / pH: x  Gluc: 247 mg/dL / Ketone: x  / Bili: x / Urobili: x   Blood: x / Protein: x / Nitrite: x   Leuk Esterase: x / RBC: x / WBC x   Sq Epi: x / Non Sq Epi: x / Bacteria: x              Base Excess, Arterial: -5.1 mmol/L *L* (03-01-25 @ 10:02)  HCO3, Arterial: 20 mmol/L *L* (03-01-25 @ 10:02)  pCO2, Arterial: 36 mmHg (03-01-25 @ 10:02)  pH, Arterial: 7.36 (03-01-25 @ 10:02)  pO2, Arterial: 297 mmHg *H* (03-01-25 @ 10:02)          Radiology:  < from: CT Angio Chest PE Protocol w/ IV Cont (03.02.25 @ 08:52) >  INTERPRETATION:  INDICATION, CLINICAL INFORMATION: Shortness of breath  TECHNIQUE: CT pulmonary angiogram of the chest . Coronal, sagittal and   3-D/MIP images were reconstructed/reviewed.  CONTRAST/COMPLICATIONS:  IV Contrast: Omnipaque 350  82 cc administered   18 cc discarded    COMPARISON: 8/10/2024 chest CT.    FINDINGS: The quality of the images are degraded by motion.    PULMONARY VESSELS: Nondiagnostic exam for evaluation of pulmonary embolus   secondary to severe motion.    HEART AND VASCULATURE: Heart size is normal. No pericardial effusion. No   aortic aneurysm or dissection. Coronary calcification.    LUNGS, AIRWAYS, PLEURA: Patent central airways. Emphysema. New 6 mm left   apical nodule. The lungs are otherwise clear. No pleural effusions or   pneumothorax.    MEDIASTINUM: No mass or lymphadenopathy.    UPPER ABDOMEN: Within normal limits.    BONES AND SOFT TISSUES: Within normal limits.    LOWER NECK: Within normal limits.    IMPRESSION:    Nondiagnostic exam for evaluation of pulmonary embolus secondary to   severe motion.    Emphysema. New 6 mm left apical nodule. Recommend follow-up chest CT in 3   months to to determine stability.    --- End of Report ---    < end of copied text >  < from: Xray Chest 1 View AP/PA (03.01.25 @ 08:48) >  INTERPRETATION:  History: Dyspnea    Technique: Single frontal view of the chest    Comparison: 8/10/2024    Findings:    Lungs: The chin obscures the left apex. Lungs are otherwise clear.    Heart/Mediastinum: Heart size is normal.    Osseous structures: Within normal limits    Impression:    Clear lungs. No interval change.    --- End of Report ---    < end of copied text >      DATA REVIEW  All data personally reviewed.  See above for details    Laboratory and Micro results--normal WBC, stable Hb, normal lytes and Cr, RVP neg  Radiology results--CTA today nondiatnostic for PE  Cardiology results--diastolic dysfunction    Tubes/Lines: none      GLOBAL ISSUE/BEST PRACTICE:  Analgesia:n  Sedation:n  HOB elevation: Y  Stress ulcer prophylaxis:y  VTE prophylaxis:y  Glycemic control:y  Nutrition:y    CODE STATUS: full

## 2025-03-02 NOTE — DISCHARGE NOTE PROVIDER - NSDCCPCAREPLAN_GEN_ALL_CORE_FT
PRINCIPAL DISCHARGE DIAGNOSIS  Diagnosis: COPD exacerbation  Assessment and Plan of Treatment: You were diagnosed with a COPD exacerbation. You were treated with IV steroids and a BiPAP machine which improved your respiratory status.  If your shortness of breath worsens, or if you develop chest pain, please do not hesitate to return to the emergency room.   Please follow up with your PCP and Pulmonologist within 1 week of discharge.     PRINCIPAL DISCHARGE DIAGNOSIS  Diagnosis: COPD exacerbation  Assessment and Plan of Treatment: You were diagnosed with a COPD exacerbation. You were treated with IV steroids, antibiotics, and a BiPAP machine which improved your respiratory status.  Please take ONE more day of oral antibiotics outpatient.   If your shortness of breath worsens, or if you develop chest pain, please do not hesitate to return to the emergency room.   Please follow up with your PCP and Pulmonologist within 1 week of discharge.     PRINCIPAL DISCHARGE DIAGNOSIS  Diagnosis: COPD exacerbation  Assessment and Plan of Treatment: You were diagnosed with a COPD exacerbation. You were treated with IV steroids, antibiotics, and a BiPAP machine which improved your respiratory status.  continue home inh and po prednisone.   Please take 3 more day of oral antibiotics outpatient  for superimpose pna  , blood cult remain neg .   If your shortness of breath worsens, or if you develop chest pain, please do not hesitate to return to the emergency room.   Please follow up with your PCP and Pulmonologist within 1 week of discharge.      SECONDARY DISCHARGE DIAGNOSES  Diagnosis: DM (diabetes mellitus)  Assessment and Plan of Treatment: CONTINUE HOME MED- METFORMIN    Diagnosis: Heart failure, diastolic, chronic  Assessment and Plan of Treatment: continue home meds  lasix.     PRINCIPAL DISCHARGE DIAGNOSIS  Diagnosis: COPD exacerbation  Assessment and Plan of Treatment: You were diagnosed with a COPD exacerbation. You were treated with IV steroids, antibiotics, and a BiPAP machine which improved your respiratory status.  continue home inh and po prednisone.   Please take 3 more day of oral antibiotics outpatient  for possible pna  , blood cult remain neg .   If your shortness of breath worsens, or if you develop chest pain, please do not hesitate to return to the emergency room.   Please follow up with your PCP and Pulmonologist within 1 week of discharge.      SECONDARY DISCHARGE DIAGNOSES  Diagnosis: DM (diabetes mellitus)  Assessment and Plan of Treatment: CONTINUE HOME MED- METFORMIN    Diagnosis: Heart failure, diastolic, chronic  Assessment and Plan of Treatment: continue home meds  lasix.     PRINCIPAL DISCHARGE DIAGNOSIS  Diagnosis: COPD exacerbation  Assessment and Plan of Treatment: You were diagnosed with a COPD exacerbation. You were treated with IV steroids, antibiotics, and a BiPAP machine which improved your respiratory status.  continue home inh and po prednisone for the next four days.  Please take 3 more day of oral antibiotics outpatient  for possible pneumonia, blood cult remain neg .   Please also take the electrolyte packet for the next three days.   If your shortness of breath worsens, or if you develop chest pain, please do not hesitate to return to the emergency room.   Please follow up with your PCP and Pulmonologist within 1 week of discharge.      SECONDARY DISCHARGE DIAGNOSES  Diagnosis: DM (diabetes mellitus)  Assessment and Plan of Treatment: CONTINUE HOME MED- METFORMIN    Diagnosis: Heart failure, diastolic, chronic  Assessment and Plan of Treatment: continue home meds  lasix.

## 2025-03-02 NOTE — CONSULT NOTE ADULT - ASSESSMENT
70 yo M with PMH of COPD/asthma (on 3L home O2), HLD and prediabetes presents to the ED with worsening shortness of breath. Patient was at [facility] 3 weeks prior with SOB, started on PO steroids with initial improvement but then worsening of symptoms. He is dyspneic     copd  asthma  resp distress  DM   70 yo M with PMH of COPD/asthma (on 3L home O2), HLD and prediabetes presents to the ED with worsening shortness of breath. Patient was at [facility] 3 weeks prior with SOB, started on PO steroids with initial improvement but then worsening of symptoms. He is dyspneic     copd  asthma  resp distress  DM  PNA eval    NIV use as needed  fio2 wean  I yen  CT chest imaging reviewed  Oral and skin care - hygiene  cx - biomarkers  systemic steroids and biomarkers - for COPD component  ICU care reviewed   monitor VS and HD and Sat

## 2025-03-02 NOTE — DISCHARGE NOTE PROVIDER - HOSPITAL COURSE
HPI:  68 yo M with PMH of COPD/asthma (on 3L home O2), HLD and prediabetes presents to the ED with worsening shortness of breath. Patient was at [facility] 3 weeks prior with SOB, started on PO steroids with initial improvement but then worsening of symptoms. He is dyspneic on exertion - cannot bend and tie his shoes, eat food or ambulate a few steps without becoming short of breath. Patient also has low appetite. ICU was consulted in the ED, recommended intubation given WOB, however patient declined.  (01 Mar 2025 10:46)      ---  HOSPITAL COURSE: Patient admitted to medicine floor for management of COPD Exacerbation. Patient was admitted directly to ICU for COPD exacerbation, was maxed on BiPAP Settings. He refused intubation though improved with BiPAP and was able to be weened to nasal cannula the following day.     Pt seen and examined on day of discharge. Patient is medically optimized for discharge to home with close outpatient followup.    PHYSICAL EXAM ON DAY OF DISCHARGE:  The patient was seen and examined on the day of discharge. Please see progress note from day of discharge for further information.         ---  CONSULTANTS:     ---  TIME SPENT:  I, the attending physician, was physically present for the key portions of the evaluation and management (E/M) service provided. The total amount of time spent reviewing the hospital notes, laboratory values, imaging findings, assessing/counseling the patient, discussing with consultant physicians, social work, nursing staff was -- minutes    ---  Primary care provider was made aware of plan for discharge:      [  ] NO     [  ] YES   HPI:  70 yo M with PMH of COPD/asthma (on 3L home O2), HLD and prediabetes presents to the ED with worsening shortness of breath. Patient was at [facility] 3 weeks prior with SOB, started on PO steroids with initial improvement but then worsening of symptoms. He is dyspneic on exertion - cannot bend and tie his shoes, eat food or ambulate a few steps without becoming short of breath. Patient also has low appetite. ICU was consulted in the ED, recommended intubation given WOB, however patient declined.  (01 Mar 2025 10:46)      ---  HOSPITAL COURSE: Patient admitted to medicine floor for management of COPD Exacerbation. Patient was admitted directly to ICU for COPD exacerbation, was maxed on BiPAP Settings. He refused intubation though improved with BiPAP and was able to be weaned to nasal cannula the following day. He was started on rocephin to treat empirically for CAP (as he is >65 and has >2 COPD exacerbations that required hospitalization within the past year). He was on precedix for anxiety and increased work of breathing. IV solumedrol 40mg IVPB q8 was switched to prednisone 50mg q8h. Patient was having anxiety and palpitations with the steroids hence prednisone was decreased to qd. Doubnebs and budesonide nebs were also given. RVP was neg, Strep neg, and Legionella neg. Can continue oral antibiotics at home to finish the course. Has not been hypertensive during the ICU course.     Pt seen and examined on day of discharge. Patient is medically optimized for discharge to home with close outpatient followup.    PHYSICAL EXAM ON DAY OF DISCHARGE:  The patient was seen and examined on the day of discharge. Please see progress note from day of discharge for further information.     T(C): 36.7 (03-04-25 @ 04:22), Max: 36.7 (03-03-25 @ 11:43)  HR: 79 (03-04-25 @ 04:22) (77 - 98)  BP: 122/78 (03-04-25 @ 04:22) (122/78 - 156/98)  RR: 19 (03-04-25 @ 04:22) (19 - 19)  SpO2: 97% (03-04-25 @ 04:22) (77% - 97%)    PHYSICAL EXAM:  Physical Exam:   Gen: well appearing, eating lunch, appears stated age  Neuro:  AAOX3  HEENT: NC/AT, EOMI.  Resp: decreased breath sounds b/l. No accessory m. use. Speaking in full sentences.   CVS: +s1, s2. RRR  Abd: Soft NT ND, Bowel sounds present  Ext: No clubbing cyanosis or edema      ---  CONSULTANTS:   Pulm: Dr. Martinez     ---  TIME SPENT:  I, the attending physician, was physically present for the key portions of the evaluation and management (E/M) service provided. The total amount of time spent reviewing the hospital notes, laboratory values, imaging findings, assessing/counseling the patient, discussing with consultant physicians, social work, nursing staff was -- minutes    ---  Primary care provider was made aware of plan for discharge:      [  ] NO     [  ] YES   HPI:  70 yo M with PMH of COPD/asthma (on 3L home O2), HLD and prediabetes presents to the ED with worsening shortness of breath. Patient was at [facility] 3 weeks prior with SOB, started on PO steroids with initial improvement but then worsening of symptoms. He is dyspneic on exertion - cannot bend and tie his shoes, eat food or ambulate a few steps without becoming short of breath. Patient also has low appetite. ICU was consulted in the ED, recommended intubation given WOB, however patient declined.  (01 Mar 2025 10:46)      ---  HOSPITAL COURSE: . Patient was dmitted to the ICU for acute  on chr  hypoxic respiratory failure 2/2 COPD exacerbation   with superimpose pna with ct chest  new 6mm pulm nodule  pt aware state  its new  .         was maxed on BiPAP Settings. He refused intubation though improved with BiPAP and was able to be weaned to nasal cannula the following day. He was started on rocephin to treat empirically for CAP (as he is >65 and has >2 COPD exacerbations that required hospitalization within the past year). He was on precedix for anxiety and increased work of breathing. IV solumedrol 40mg IVPB q8 was switched to prednisone 50mg  Q8HR .    Patient was having anxiety and palpitations with the steroids hence prednisone was decreased to qd. Doubnebs and budesonide nebs were also given. RVP was neg, Strep neg, and Legionella neg. Can continue oral antibiotics at home to finish the course VANTIN 200 MG PO BID 3  DAYS   for pna  . Has not been hypertensive during the ICU course.  Hx mild diastolic chf - on home Lasix / on hold resume at discharge  , echo LVEF59%.    Pt seen and examined on day of discharge. Patient is medically optimized for discharge to home with close outpatient followup.    PHYSICAL EXAM ON DAY OF DISCHARGE:  The patient was seen and examined on the day of discharge. Please see progress note from day of discharge for further information.     T(C): 36.7 (03-04-25 @ 04:22), Max: 36.7 (03-03-25 @ 11:43)  HR: 79 (03-04-25 @ 04:22) (77 - 98)  BP: 122/78 (03-04-25 @ 04:22) (122/78 - 156/98)  RR: 19 (03-04-25 @ 04:22) (19 - 19)  SpO2: 97% (03-04-25 @ 04:22) (77% - 97%)    PHYSICAL EXAM:  Physical Exam: obesity   Gen: well appearing, eating lunch, appears stated age  Neuro:  AAOX3  HEENT: NC/AT, EOMI.  Resp: decreased breath sounds b/l. No accessory m. use. Speaking in full sentences.   CVS: +s1, s2. RRR  Abd: Soft NT ND, Bowel sounds present  Ext: No clubbing cyanosis or edema      ---  CONSULTANTS:   Pulm: Dr. Martinez     ---  TIME SPENT:  I, the attending physician, was physically present for the key portions of the evaluation and management (E/M) service provided. The total amount of time spent reviewing the hospital notes, laboratory values, imaging findings, assessing/counseling the patient, discussing with consultant physicians, social work, nursing staff was -45- minutes    ---     HPI:  68 yo M with PMH of COPD/asthma (on 3L home O2), HLD and prediabetes presents to the ED with worsening shortness of breath. Patient was at [facility] 3 weeks prior with SOB, started on PO steroids with initial improvement but then worsening of symptoms. He is dyspneic on exertion - cannot bend and tie his shoes, eat food or ambulate a few steps without becoming short of breath. Patient also has low appetite. ICU was consulted in the ED, recommended intubation given WOB, however patient declined.  (01 Mar 2025 10:46)      ---  HOSPITAL COURSE: . Patient was admitted to the ICU for acute  on chr  hypoxic respiratory failure 2/2 COPD exacerbation. CT showed new 6mm pulm nodule, pt is aware and states  its new, as he was monitored for the past few months regarding other nodules.    He was maxed on BiPAP Settings. He refused intubation though improved with BiPAP and was able to be weaned to nasal cannula the following day. He was started on rocephin to treat empirically for CAP (as he is >65 and has >2 COPD exacerbations that required hospitalization within the past year). He was on precedix for anxiety and increased work of breathing. IV solumedrol 40mg IVPB q8 was switched to prednisone 50mg  Q8HR .    Patient was having anxiety and palpitations with the steroids hence prednisone was decreased to qd. Doubnebs and budesonide nebs were also given. RVP was neg, Strep neg, and Legionella neg. Can continue oral antibiotics at home to finish the course VANTIN 200 MG PO BID 3  DAYS for pna. Has not been hypertensive during the ICU course.  Hx mild diastolic chf - on home Lasix / on hold, can resume at discharge, echo LVEF59%.    Pt seen and examined on day of discharge. Patient is medically optimized for discharge to home with close outpatient followup.    PHYSICAL EXAM ON DAY OF DISCHARGE:  The patient was seen and examined on the day of discharge. Please see progress note from day of discharge for further information.     T(C): 36.7 (03-04-25 @ 04:22), Max: 36.7 (03-03-25 @ 11:43)  HR: 79 (03-04-25 @ 04:22) (77 - 98)  BP: 122/78 (03-04-25 @ 04:22) (122/78 - 156/98)  RR: 19 (03-04-25 @ 04:22) (19 - 19)  SpO2: 97% (03-04-25 @ 04:22) (77% - 97%)    PHYSICAL EXAM:  Physical Exam: obesity   Gen: well appearing, eating lunch, appears stated age  Neuro:  AAOX3  HEENT: NC/AT, EOMI.  Resp: decreased breath sounds b/l. No accessory m. use. Speaking in full sentences.   CVS: +s1, s2. RRR  Abd: Soft NT ND, Bowel sounds present  Ext: No clubbing cyanosis or edema      ---  CONSULTANTS:   Pulm: Dr. Martinez     ---  TIME SPENT:  I, the attending physician, was physically present for the key portions of the evaluation and management (E/M) service provided. The total amount of time spent reviewing the hospital notes, laboratory values, imaging findings, assessing/counseling the patient, discussing with consultant physicians, social work, nursing staff was -45- minutes    ---     HPI:  68 yo M with PMH of COPD/asthma (on 3L home O2), HLD and prediabetes presents to the ED with worsening shortness of breath. Patient was at [facility] 3 weeks prior with SOB, started on PO steroids with initial improvement but then worsening of symptoms. He is dyspneic on exertion - cannot bend and tie his shoes, eat food or ambulate a few steps without becoming short of breath. Patient also has low appetite. ICU was consulted in the ED, recommended intubation given WOB, however patient declined.  (01 Mar 2025 10:46)      ---  HOSPITAL COURSE: . Patient was admitted to the ICU for acute  on chr  hypoxic respiratory failure 2/2 COPD exacerbation. CT showed new 6mm pulm nodule, pt is aware and states  its new, as he was monitored for the past few months regarding other nodules.    He was maxed on BiPAP Settings. He refused intubation though improved with BiPAP and was able to be weaned to nasal cannula the following day. He was started on rocephin to treat empirically for CAP (as he is >65 and has >2 COPD exacerbations that required hospitalization within the past year). He was on precedix for anxiety and increased work of breathing. IV solumedrol 40mg IVPB q8 was switched to prednisone 50mg  Q8HR .    Patient was having anxiety and palpitations with the steroids hence prednisone was decreased to qd. Doubnebs and budesonide nebs were also given. RVP was neg, Strep neg, and Legionella neg. Can continue oral antibiotics at home to finish the course VANTIN 200 MG PO BID 3  DAYS for pna. Has not been hypertensive during the ICU course.  Hx mild diastolic chf - on home Lasix / on hold, can resume at discharge, echo LVEF59%.    Pt seen and examined on day of discharge. Patient is medically optimized for discharge to home with close outpatient followup.    PHYSICAL EXAM ON DAY OF DISCHARGE: 3/4/25  The patient was seen and examined on the day of discharge. Please see progress note from day of discharge for further information.     T(C): 36.7 (03-04-25 @ 04:22), Max: 36.7 (03-03-25 @ 11:43)  HR: 79 (03-04-25 @ 04:22) (77 - 98)  BP: 122/78 (03-04-25 @ 04:22) (122/78 - 156/98)  RR: 19 (03-04-25 @ 04:22) (19 - 19)  SpO2: 97% (03-04-25 @ 04:22) (77% - 97%)    PHYSICAL EXAM:  Physical Exam: obesity   Gen: well appearing, eating lunch, appears stated age  Neuro:  AAOX3  HEENT: NC/AT, EOMI.  Resp: decreased breath sounds b/l. No accessory m. use. Speaking in full sentences.   CVS: +s1, s2. RRR  Abd: Soft NT ND, Bowel sounds present  Ext: No clubbing cyanosis or edema      ---  CONSULTANTS:   Pulm: Dr. Martinez     ---  TIME SPENT:  I, the attending physician, was physically present for the key portions of the evaluation and management (E/M) service provided. The total amount of time spent reviewing the hospital notes, laboratory values, imaging findings, assessing/counseling the patient, discussing with consultant physicians, social work, nursing staff was -45- minutes    ---

## 2025-03-03 LAB
ALBUMIN SERPL ELPH-MCNC: 2.9 G/DL — LOW (ref 3.3–5)
ALP SERPL-CCNC: 70 U/L — SIGNIFICANT CHANGE UP (ref 40–120)
ALT FLD-CCNC: 70 U/L — SIGNIFICANT CHANGE UP (ref 12–78)
ANION GAP SERPL CALC-SCNC: 8 MMOL/L — SIGNIFICANT CHANGE UP (ref 5–17)
AST SERPL-CCNC: 30 U/L — SIGNIFICANT CHANGE UP (ref 15–37)
BASOPHILS # BLD AUTO: 0.02 K/UL — SIGNIFICANT CHANGE UP (ref 0–0.2)
BASOPHILS NFR BLD AUTO: 0.1 % — SIGNIFICANT CHANGE UP (ref 0–2)
BILIRUB SERPL-MCNC: 0.4 MG/DL — SIGNIFICANT CHANGE UP (ref 0.2–1.2)
BUN SERPL-MCNC: 22 MG/DL — SIGNIFICANT CHANGE UP (ref 7–23)
CALCIUM SERPL-MCNC: 8.2 MG/DL — LOW (ref 8.5–10.1)
CHLORIDE SERPL-SCNC: 106 MMOL/L — SIGNIFICANT CHANGE UP (ref 96–108)
CO2 SERPL-SCNC: 25 MMOL/L — SIGNIFICANT CHANGE UP (ref 22–31)
CREAT SERPL-MCNC: 1 MG/DL — SIGNIFICANT CHANGE UP (ref 0.5–1.3)
EGFR: 80 ML/MIN/1.73M2 — SIGNIFICANT CHANGE UP
EGFR: 80 ML/MIN/1.73M2 — SIGNIFICANT CHANGE UP
EOSINOPHIL # BLD AUTO: 0 K/UL — SIGNIFICANT CHANGE UP (ref 0–0.5)
EOSINOPHIL NFR BLD AUTO: 0 % — SIGNIFICANT CHANGE UP (ref 0–6)
GLUCOSE SERPL-MCNC: 143 MG/DL — HIGH (ref 70–99)
HCT VFR BLD CALC: 37 % — LOW (ref 39–50)
HGB BLD-MCNC: 12.1 G/DL — LOW (ref 13–17)
IMM GRANULOCYTES NFR BLD AUTO: 1.9 % — HIGH (ref 0–0.9)
LYMPHOCYTES # BLD AUTO: 0.97 K/UL — LOW (ref 1–3.3)
LYMPHOCYTES # BLD AUTO: 5.3 % — LOW (ref 13–44)
MAGNESIUM SERPL-MCNC: 2.4 MG/DL — SIGNIFICANT CHANGE UP (ref 1.6–2.6)
MCHC RBC-ENTMCNC: 28.7 PG — SIGNIFICANT CHANGE UP (ref 27–34)
MCHC RBC-ENTMCNC: 32.7 G/DL — SIGNIFICANT CHANGE UP (ref 32–36)
MCV RBC AUTO: 87.9 FL — SIGNIFICANT CHANGE UP (ref 80–100)
MONOCYTES # BLD AUTO: 0.96 K/UL — HIGH (ref 0–0.9)
MONOCYTES NFR BLD AUTO: 5.3 % — SIGNIFICANT CHANGE UP (ref 2–14)
MRSA PCR RESULT.: SIGNIFICANT CHANGE UP
NEUTROPHILS # BLD AUTO: 15.85 K/UL — HIGH (ref 1.8–7.4)
NEUTROPHILS NFR BLD AUTO: 87.4 % — HIGH (ref 43–77)
NRBC BLD AUTO-RTO: 0 /100 WBCS — SIGNIFICANT CHANGE UP (ref 0–0)
PHOSPHATE SERPL-MCNC: 2.5 MG/DL — SIGNIFICANT CHANGE UP (ref 2.5–4.5)
PLATELET # BLD AUTO: 380 K/UL — SIGNIFICANT CHANGE UP (ref 150–400)
POTASSIUM SERPL-MCNC: 4.3 MMOL/L — SIGNIFICANT CHANGE UP (ref 3.5–5.3)
POTASSIUM SERPL-SCNC: 4.3 MMOL/L — SIGNIFICANT CHANGE UP (ref 3.5–5.3)
PROT SERPL-MCNC: 7 G/DL — SIGNIFICANT CHANGE UP (ref 6–8.3)
RBC # BLD: 4.21 M/UL — SIGNIFICANT CHANGE UP (ref 4.2–5.8)
RBC # FLD: 16.3 % — HIGH (ref 10.3–14.5)
S AUREUS DNA NOSE QL NAA+PROBE: SIGNIFICANT CHANGE UP
SODIUM SERPL-SCNC: 139 MMOL/L — SIGNIFICANT CHANGE UP (ref 135–145)
WBC # BLD: 18.14 K/UL — HIGH (ref 3.8–10.5)
WBC # FLD AUTO: 18.14 K/UL — HIGH (ref 3.8–10.5)

## 2025-03-03 PROCEDURE — 99233 SBSQ HOSP IP/OBS HIGH 50: CPT | Mod: GC

## 2025-03-03 RX ORDER — PREDNISONE 20 MG/1
50 TABLET ORAL DAILY
Refills: 0 | Status: DISCONTINUED | OUTPATIENT
Start: 2025-03-03 | End: 2025-03-04

## 2025-03-03 RX ORDER — MELATONIN 5 MG
5 TABLET ORAL AT BEDTIME
Refills: 0 | Status: DISCONTINUED | OUTPATIENT
Start: 2025-03-03 | End: 2025-03-04

## 2025-03-03 RX ORDER — AZITHROMYCIN 250 MG
500 CAPSULE ORAL ONCE
Refills: 0 | Status: COMPLETED | OUTPATIENT
Start: 2025-03-03 | End: 2025-03-03

## 2025-03-03 RX ADMIN — CEFTRIAXONE 100 MILLIGRAM(S): 500 INJECTION, POWDER, FOR SOLUTION INTRAMUSCULAR; INTRAVENOUS at 12:14

## 2025-03-03 RX ADMIN — ENOXAPARIN SODIUM 40 MILLIGRAM(S): 100 INJECTION SUBCUTANEOUS at 11:22

## 2025-03-03 RX ADMIN — IPRATROPIUM BROMIDE AND ALBUTEROL SULFATE 3 MILLILITER(S): .5; 2.5 SOLUTION RESPIRATORY (INHALATION) at 20:48

## 2025-03-03 RX ADMIN — BUDESONIDE 0.5 MILLIGRAM(S): 0.25 SUSPENSION RESPIRATORY (INHALATION) at 20:48

## 2025-03-03 RX ADMIN — IPRATROPIUM BROMIDE AND ALBUTEROL SULFATE 3 MILLILITER(S): .5; 2.5 SOLUTION RESPIRATORY (INHALATION) at 13:37

## 2025-03-03 RX ADMIN — BUDESONIDE 0.5 MILLIGRAM(S): 0.25 SUSPENSION RESPIRATORY (INHALATION) at 07:47

## 2025-03-03 RX ADMIN — IPRATROPIUM BROMIDE AND ALBUTEROL SULFATE 3 MILLILITER(S): .5; 2.5 SOLUTION RESPIRATORY (INHALATION) at 07:48

## 2025-03-03 RX ADMIN — INSULIN LISPRO 4: 100 INJECTION, SOLUTION INTRAVENOUS; SUBCUTANEOUS at 15:54

## 2025-03-03 RX ADMIN — IPRATROPIUM BROMIDE AND ALBUTEROL SULFATE 3 MILLILITER(S): .5; 2.5 SOLUTION RESPIRATORY (INHALATION) at 00:44

## 2025-03-03 RX ADMIN — PREDNISONE 50 MILLIGRAM(S): 20 TABLET ORAL at 11:22

## 2025-03-03 RX ADMIN — ATORVASTATIN CALCIUM 10 MILLIGRAM(S): 80 TABLET, FILM COATED ORAL at 21:00

## 2025-03-03 RX ADMIN — Medication 40 MILLIGRAM(S): at 11:23

## 2025-03-03 RX ADMIN — Medication 500 MILLIGRAM(S): at 11:22

## 2025-03-03 NOTE — PROGRESS NOTE ADULT - ASSESSMENT
68 yo M with PMH of COPD/asthma (on 3L home O2), HLD and prediabetes presented to the ED with worsening shortness of breath, admitted to the ICU for acute hypoxic respiratory failure 2/2 COPD exacerbation.    #Acute  on chr   hypoxic respiratory failure 2/2 COPD exacerbation  #Asthma  - Admit to ICU  - Continue BiPAP  - Patient has declined intubation at this time; trial of Precedex with BiPAP per ICU- now off bipap and off prece dex    - Continue IV solumedrol 40 mg ivpb q8hr  switch 50 mg po prednisone   - Continue Rocephin  day  3 and dc Azithro  - Continue Duonebs, Budesonide nebs  - Follow up full RVP neg, Strep  pending and Legionella antigens neg   - Rest of management per ICU    #Prediabetes  - Hold home metformin  - LDISS, accuchecks Q6h, hypoglycemia protocol  - Follow up A1c 6.6      - Hx mild diastolic chf - on home Lasix / on hold , echo LVEF59%       #VTE ppx  - Per ICU 68 yo M with PMH of COPD/asthma (on 3L home O2), HLD and prediabetes presented to the ED with worsening shortness of breath, admitted to the ICU for acute hypoxic respiratory failure 2/2 COPD exacerbation.    #Acute  on chr   hypoxic respiratory failure 2/2 COPD exacerbation  #Asthma  - Admit to ICU  - Continue BiPAP  - Patient has declined intubation at this time; trial of Precedex with BiPAP per ICU- now off bipap and off precedex    - IV solumedrol 40 mg ivpb q8hr  --> switch 50 mg po prednisone   - Continue Rocephin  day  3 and dc Azithro  - Continue Duonebs, Budesonide nebs  - Follow up full RVP neg, Strep  pending and Legionella antigens neg   - Rest of management per ICU    #Prediabetes  - Hold home metformin  - LDISS, accuchecks Q6h, hypoglycemia protocol  - Follow up A1c 6.6      - Hx mild diastolic chf - on home Lasix / on hold , echo LVEF59%       #VTE ppx  - Per ICU

## 2025-03-03 NOTE — PROGRESS NOTE ADULT - ASSESSMENT
70 yo M with PMH of COPD/asthma (on 3L home O2), HLD and prediabetes presents to the ED with worsening shortness of breath. Patient was at [facility] 3 weeks prior with SOB, started on PO steroids with initial improvement but then worsening of symptoms. He is dyspneic     copd  asthma  resp distress  DM  PNA eval    NIV use as needed  fio2 wean  I yen  CT chest imaging reviewed  Oral and skin care - hygiene  cx - biomarkers  systemic steroids and biomarkers - for COPD component  ICU care reviewed   monitor VS and HD and Sat

## 2025-03-03 NOTE — PROGRESS NOTE ADULT - ASSESSMENT
HPI: 68 y/o M PMHx, COPD/Asthma on 3L Home O2, HLD, and Pre-DM complaining with worsening shortness of breath. ICU consulted for COPD exacerbation and significant work of breathing.    #AHRF 2/2 COPD Exacerbation  #Asthma  #HLD  #Pre-DM  #R/o lactic acidosis  #Tachycardia    #Neuro  -AAOX4  -Precedex gtt off.    #Respiratory  -Saturating well on 4L NC.  -Previously On BiPAP in the ER on max settings and desaturating with conversation. ABG at that time was: pH 7.36, PCO2 36, HCO3 20.   -Dr Gibbs advised patient that he required intubation. He refused despite cont'd counseling. He is AAOX3, with a normal blood gas that would not affect his decision making capacity. However, pt agreeable for intubation if he decompensates.  -Pt tachycardic and hypoxic, which may be caused by PE; however, patient is too unstable for CTA at this time. In addition, patient is warm on exam and may have underlying febrile illness driving tachycardia. Will check rectal T on arrival to ICU.  -Pt high risk COPD exacerbation given age >65, >2 COPD exac this year, and recent hospitalization; will initiate treatment for CAP. Rocephin & azithro.  -Steroids decreased to prednisone 50mg qd as pt not tolerating q8 dosing 2/2 anxiety, palpitations, etc.  -Duonebs q6, Budesonide nebs q12  -Albuterol q2 PRN  -Interchange home trellegy inhaler as above  -F/U full viral panel     #Cardiac  -tachycardia:       -Sinus tachy on EKG, suspect 2/2 to his work of breathing, CTA negative for PE.  -Ischemia:       -No signs/sz of ischemia, EKG sinus tachycardia w/o [place] and pt denying active CP       -Resume home statin.  -Volume:        -appears euvolemic on examination        -no known hx of CHF,        -TTE. Normal LVEF 59% and RV function. mild grade 1 LV diastolic dysfunction.  -BP        -Has remained normotensive after being HTN in ED 2/2 WOB and distress.    #GI  -PPI ppx  -Diet ordered    #Endo  -Lactate 2. R/o metformin induced lactic acidosis  -New DM diagnoses . A1C 6.6  -Hold metformin  -MDISS as pt now on steroids    #Renal  -Cr 1.1, baseline ~1  -No active issues  -Avoid nephrotoxic medications as able    #ID  -Given high risk COPD exacerbation, will treat empirically for CAP.  On Rocephin, [place] DC'd.  -Leukocytosis 2/2 PO steroid use x3 weeks  -RVP negative, pt warm to touch. T 97.  -Cxray without obvious consolidation  -Monitor WBC, fever curve.  -Tylenol PRN.   -legionella and strep Ur Ag negative    #Heme  -No signs/sz of bleeding. Hgb stable  -DVT PPX Lovenoz 40mg subQ daily

## 2025-03-03 NOTE — CARE COORDINATION ASSESSMENT. - NS SW HOME EQUIPMENT
Pt shared that he sleeps in a recliner chair  Pt has portable oxygen and concentrator from [facility]oxygen

## 2025-03-03 NOTE — PHYSICAL THERAPY INITIAL EVALUATION ADULT - GENERAL OBSERVATIONS, REHAB EVAL
Received seated in bedside chair with + 4L NC 03, tele, heplock IV. Patient in no apparent distress.

## 2025-03-03 NOTE — CARE COORDINATION ASSESSMENT. - NSCAREPROVIDERS_GEN_ALL_CORE_FT
CARE PROVIDERS:  Accepting Physician: Sonia Myers  Access Services: Rod Rodríguez  Administration: Hi Maldonado  Administration: Juanita Kyle  Administration: Marc Haro  Admitting: Sonia Myers  Attending: Sonia Myers  Case Management: Brandy Méndez  Clinical Doc. Improvement: Maye Dominguez  Consultant: Lamonte Martinez  Consultant: Weil, Patricia  Covering Team: Doris Fernando  ED Attending: Elisabeth Hunt  ED Nurse: Rohini Zayas  HIM/Billing & Coding: Rohini Stewart  Nurse: Jone Jeffries  Nurse: Jose Mcdonald  Nurse: Teri Fernandez  Nurse: Natasha Guillermo  Nurse: Louissaint, Nancy  Ordered: ServiceAccount, SCMMLM  Ordered: Doctor, Unknown  Ordered: ADM, User  PCA/Nursing Assistant: aurelio Garcia  Primary Team: Navi Rossi  Primary Team: Jossie Gibbs  Primary Team: Cinthya Queen  Primary Team: Rohini Madrigal  Primary Team: Kyler Hernandez  Primary Team: Dung Mcdowell  Primary Team: Daniel Burgess  Primary Team: Adelaida Hector  Primary Team: Jerry Bolton  Registered Dietitian: Daiana Kaufman  Registered Dietitian: Karen Olguin  Research: Valeri Greene  Respiratory Therapy: Miri Mendosa  : Maryan Resendez  Team: [facility], Team

## 2025-03-03 NOTE — PROGRESS NOTE ADULT - ATTENDING COMMENTS
68 yo man with Hx advanced COPD/asthma, chronic hypoxemic respiratory failure, preDM, recent COPD exacerbation and still on prednisone taper now presents with acute hypoxemic respiratory failure from likely COPD exacerbation, now much improved.    Neuro: continue precedex for anxiolysis  CV: hemodynamically stable, diastolic dysfunction, appears euvolemic; HLD, continue statin  Resp: acute hypoxemic respiratory failure, COPD exacerbation; change solumedrol to prednisone 50mg daily: standing bronchodilators and inhaled steroids  Renal: normal renal function  GI: start PO diet, PPI  ID: empiric CTX, will complete course of azithro today  Diabetes: preDM, check fs, ISS, check A1c 70 yo man with Hx advanced COPD/asthma, chronic hypoxemic respiratory failure, preDM, recent COPD exacerbation and still on prednisone taper now presents with acute hypoxemic respiratory failure from likely COPD exacerbation, now much improved.    Neuro: continue precedex for anxiolysis  CV: hemodynamically stable, diastolic dysfunction, appears euvolemic; HLD, continue statin  Resp: acute hypoxemic respiratory failure, COPD exacerbation; change solumedrol to prednisone 50mg daily: standing bronchodilators and inhaled steroids  Renal: normal renal function  GI: start PO diet, PPI  ID: empiric CTX, will complete course of azithro today  Diabetes: preDM, check fs, ISS, A1c 6.6    pt stable for downgrade to floors

## 2025-03-03 NOTE — DIETITIAN INITIAL EVALUATION ADULT - PERTINENT MEDS FT
MEDICATIONS  (STANDING):  albuterol/ipratropium for Nebulization 3 milliLiter(s) Nebulizer every 6 hours  atorvastatin 10 milliGRAM(s) Oral at bedtime  azithromycin   Tablet 500 milliGRAM(s) Oral once  buDESOnide    Inhalation Suspension 0.5 milliGRAM(s) Inhalation every 12 hours  cefTRIAXone   IVPB 1000 milliGRAM(s) IV Intermittent every 24 hours  chlorhexidine 2% Cloths 1 Application(s) Topical <User Schedule>  dextrose 5%. 1000 milliLiter(s) (100 mL/Hr) IV Continuous <Continuous>  dextrose 5%. 1000 milliLiter(s) (50 mL/Hr) IV Continuous <Continuous>  dextrose 50% Injectable 25 Gram(s) IV Push once  dextrose 50% Injectable 12.5 Gram(s) IV Push once  dextrose 50% Injectable 25 Gram(s) IV Push once  enoxaparin Injectable 40 milliGRAM(s) SubCutaneous every 24 hours  glucagon  Injectable 1 milliGRAM(s) IntraMuscular once  insulin lispro (ADMELOG) corrective regimen sliding scale   SubCutaneous three times a day before meals  insulin lispro (ADMELOG) corrective regimen sliding scale   SubCutaneous at bedtime  pantoprazole  Injectable 40 milliGRAM(s) IV Push daily  predniSONE   Tablet 50 milliGRAM(s) Oral daily    MEDICATIONS  (PRN):  acetaminophen     Tablet .. 650 milliGRAM(s) Oral every 6 hours PRN Temp greater or equal to 38C (100.4F)  albuterol    90 MICROgram(s) HFA Inhaler 2 Puff(s) Inhalation every 2 hours PRN Shortness of Breath and/or Wheezing  dextrose Oral Gel 15 Gram(s) Oral once PRN Blood Glucose LESS THAN 70 milliGRAM(s)/deciliter

## 2025-03-03 NOTE — CARE COORDINATION ASSESSMENT. - PRO ARRIVE FROM
Pt shared that he lives in a trailer home next to his job. The patient reported that he has been living in a trailer for the past 30 years./other (specify)

## 2025-03-03 NOTE — CASE MANAGEMENT PROGRESS NOTE - NSCMPROGRESSNOTE_GEN_ALL_CORE
CM will continue to collaborate with interdisciplinary team and remain available to assist. Patient discussed during rounds and remains acute in ICU on 4L of oxygen via NC. Dx: AHRF and COPD Ex. Patient receiving IVPB Zithromax and IVPB Rocephin. PT requesting RW and Home PT. Patient declined Home PT at this time. 485 initiated. DME referral sent. CM will continue to collaborate with interdisciplinary team and remain available to assist.

## 2025-03-03 NOTE — PROGRESS NOTE ADULT - SUBJECTIVE AND OBJECTIVE BOX
Interval Events: Pt seen and examined. No overnight events. Pt jittery and anxious from steroid use. No other acute c/o at this time.     Review of Systems:  Constitutional: No fever, chills, fatigue  Neuro: +Jittery. No headache, numbness, weakness  Resp: ++cough. No wheezing, shortness of breath  CVS: No chest pain, palpitations, leg swelling  GI: No abdominal pain, nausea, vomiting, diarrhea   : No dysuria, frequency, incontinence  Skin: No itching, burning, rashes, or lesions   Msk: No joint pain or swelling  Psych: +Anxious. No depression, anxiety, mood swings    ICU Vital Signs Last 24 Hrs  T(C): 36.7 (03 Mar 2025 11:43), Max: 37 (02 Mar 2025 23:41)  T(F): 98.1 (03 Mar 2025 11:43), Max: 98.6 (02 Mar 2025 23:41)  HR: 82 (03 Mar 2025 07:50) (68 - 100)  BP: 102/86 (03 Mar 2025 07:00) (86/59 - 154/92)  BP(mean): 94 (03 Mar 2025 07:00) (67 - 94)  ABP: --  ABP(mean): --  RR: 21 (03 Mar 2025 06:00) (20 - 26)  SpO2: 94% (03 Mar 2025 07:50) (93% - 99%)    O2 Parameters below as of 03 Mar 2025 07:50  Patient On (Oxygen Delivery Method): nasal cannula, 3 lpm              03-02-25 @ 07:01  -  03-03-25 @ 07:00  --------------------------------------------------------  IN: 995.9 mL / OUT: 1450 mL / NET: -454.1 mL        CAPILLARY BLOOD GLUCOSE      POCT Blood Glucose.: 125 mg/dL (03 Mar 2025 11:52)      I&O's Summary    02 Mar 2025 07:01  -  03 Mar 2025 07:00  --------------------------------------------------------  IN: 995.9 mL / OUT: 1450 mL / NET: -454.1 mL        Physical Exam:   Gen: appears stated age, NAD, +NC in place  Neuro: aaox3  HEENT: NC/AT EOMI  Resp: Lungs CTA BL. no accessory m. use. speaking in full sentences  CVS: +S1, S2.RRR  Abd: Soft, NT , ND. Bowel sounds present  Ext: No c/c/e  Skin:  WWP    Meds:  cefTRIAXone   IVPB IV Intermittent      atorvastatin Oral  dextrose 50% Injectable IV Push  dextrose 50% Injectable IV Push  dextrose 50% Injectable IV Push  dextrose Oral Gel Oral PRN  glucagon  Injectable IntraMuscular  insulin lispro (ADMELOG) corrective regimen sliding scale SubCutaneous  insulin lispro (ADMELOG) corrective regimen sliding scale SubCutaneous  predniSONE   Tablet Oral    albuterol    90 MICROgram(s) HFA Inhaler Inhalation PRN  albuterol/ipratropium for Nebulization Nebulizer  buDESOnide    Inhalation Suspension Inhalation    acetaminophen     Tablet .. Oral PRN      enoxaparin Injectable SubCutaneous    pantoprazole  Injectable IV Push      dextrose 5%. IV Continuous  dextrose 5%. IV Continuous      chlorhexidine 2% Cloths Topical                              12.1   18.14 )-----------( 380      ( 03 Mar 2025 05:50 )             37.0       03-03    139  |  106  |  22  ----------------------------<  143[H]  4.3   |  25  |  1.00    Ca    8.2[L]      03 Mar 2025 05:50  Phos  2.5     03-03  Mg     2.4     03-03    TPro  7.0  /  Alb  2.9[L]  /  TBili  0.4  /  DBili  x   /  AST  30  /  ALT  70  /  AlkPhos  70  03-03            Urinalysis Basic - ( 03 Mar 2025 05:50 )    Color: x / Appearance: x / SG: x / pH: x  Gluc: 143 mg/dL / Ketone: x  / Bili: x / Urobili: x   Blood: x / Protein: x / Nitrite: x   Leuk Esterase: x / RBC: x / WBC x   Sq Epi: x / Non Sq Epi: x / Bacteria: x      .Blood Blood-Peripheral   No growth at 24 hours -- 03-01 @ 07:45  .Blood Blood-Peripheral   No growth at 24 hours -- 03-01 @ 07:40          Base Excess, Arterial: -5.1 mmol/L *L* (03-01-25 @ 10:02)  HCO3, Arterial: 20 mmol/L *L* (03-01-25 @ 10:02)  pCO2, Arterial: 36 mmHg (03-01-25 @ 10:02)  pH, Arterial: 7.36 (03-01-25 @ 10:02)  pO2, Arterial: 297 mmHg *H* (03-01-25 @ 10:02)  Culture Results:   No growth at 24 hours (03-01-25 @ 07:45)  Culture Results:   No growth at 24 hours (03-01-25 @ 07:40)          Radiology:        Bedside Ultrasound:    Tubes/Lines:      GLOBAL ISSUE/BEST PRACTICE:  Analgesia:  Sedation:  HOB elevation: Y  Stress ulcer prophylaxis:  VTE prophylaxis:  Glycemic control:  Nutrition:    CODE STATUS:

## 2025-03-03 NOTE — PROGRESS NOTE ADULT - NUTRITIONAL ASSESSMENT
This patient has been assessed with a concern for Malnutrition and has been determined to have a diagnosis/diagnoses of Moderate protein-calorie malnutrition.    This patient is being managed with:   Diet Soft and Bite Sized-  Entered: Mar  1 2025  5:22PM    The following pending diet order is being considered for treatment of Moderate protein-calorie malnutrition:  Diet Soft and Bite Sized-  Consistent Carbohydrate {Evening Snack}  Low Sodium  Supplement Feeding Modality:  Oral  Ensure Max Cans or Servings Per Day:  1       Frequency:  Daily  Entered: Mar  3 2025 11:46AM

## 2025-03-03 NOTE — DIETITIAN INITIAL EVALUATION ADULT - NS FNS WEIGHT CHANGE REASON
CHIEF COMPLAINT: Seizures.     HISTORY OF PRESENT ILLNESS: Video call for follow up for seizures. His father was also on the conference call. This patient is a 26-year-old, right-handed male with a history of autistic spectrum disorder.   The patient himself cannot provide much history, and the majority of the history is provided by his father.  Sine the last visit over one year ago, he had 2 seizures, one was 2 months ago and another was about one year ago, both were probably CPS. He is currently taking Lamotrigine 150 mg bid and Depakote 500 - 1000.    Patient had seizure onset in April 2015, another seizure 10 days after that,  both were described as GTCs.  He was initially started on keppra.  It was stopped because of GI side effects.  Then he was started on Lamotrigine, Depakote was added later.  He is currently on Lamictal 100mg bid and Depakote 500-1000.  He had behavioral problems when he was on Lamictal 150 mg bid.  His behavior has been really good for the past 2 years.  He had a seizure on 4/12/2016 that caused fractures of the right zygomatic arch, maxillary sinus and orbit.        According to the mother, on 04/30/2015, the patient was found at home on the kitchen floor. He was cyanotic. He had foaming at the mouth. He had some bleeding from the nose. He was unresponsive, and he had some tonic-clonic movement of his extremities. The clonic-tonic movement lasted for about 1 minute. The mother did not see the beginning of the event. There was no loss of bowel or bladder control. The patient did not bite his tongue. Afterwards the patient was very lethargic, still unresponsive until he was in the ambulance; then he was able to recognize his father and was able to communicate a little bit.    The patient had no prior history of seizures. He has been taking Geodon for 7 years. No recent medication changes. No recent illness. When EMS arrived at the scene, the blood sugar was found to be 81.      TRIGGERS  FOR SEIZURES: Unclear.    RISK FACTORS FOR SEIZURES: He has a history of autistic spectrum disorder. No history of head trauma with loss of consciousness. No history of CNS infection. No history of febrile convulsions.        Current Outpatient Medications   Medication Sig Dispense Refill     divalproex sodium extended-release (DEPAKOTE ER) 500 MG 24 hr tablet TAKE ONE TABLET BY MOUTH EVERY MORNING AND TWO TABLETS EVERY EVENING 90 tablet 0     lamoTRIgine (LAMICTAL) 100 MG tablet Take 1.5 tablets (150 mg) by mouth twice a day. 90 tablet 0     Multiple Vitamin (MULTIVITAMINS PO)        ziprasidone (GEODON) 40 MG capsule Take 1 capsule (40 mg) by mouth 2 times daily (with meals) AM 60 capsule 0     ziprasidone (GEODON) 80 MG capsule Take 1 capsule (80 mg) by mouth 2 times daily (with meals) AM AND PM 60 capsule 0     cetirizine (ZYRTEC) 10 MG tablet Take 1 tablet (10 mg) by mouth 2 times daily as needed for allergies (Patient not taking: Reported on 1/19/2021) 100 tablet 3     doxycycline hyclate (VIBRAMYCIN) 100 MG capsule Take 1 capsule (100 mg) by mouth 2 times daily for 10 days (Patient not taking: Reported on 1/21/2021) 20 capsule 0     guanFACINE (TENEX) 1 MG tablet Take 1 tablet (1 mg) by mouth 2 times daily (Patient not taking: Reported on 1/21/2021) 60 tablet 0     montelukast (SINGULAIR) 10 MG tablet Take 1 tablet (10 mg) by mouth At Bedtime (Patient not taking: Reported on 1/19/2021) 20 tablet 3         PAST MEDICAL HISTORY: Autistic disorder, sleep disturbances.      FAMILY HISTORY: No family history of seizures. Grandmother had a history of migraine headaches. Mother had a history of anxiety and panic attacks. Father had a history of chemical dependency.    SOCIAL HISTORY: He is living with his parents. He had special education. He is single. No alcohol, no drug abuse, no smoking. He is not working.     PHYSICAL EXAMINATION:     Alert, oriented to name and place.      REVIEW OF SYSTEMS: 8 point review of  systems is negative.     PREVIOUS DIAGNOSTIC TESTING: MRI of the brain on 04/30 showed a negative study. EEG on 05/01 showed a normal study.      IMPRESSION:    1. New-onset seizure.    The patient himself cannot provide much history, and the majority of the history is provided by his father.  Sine the last visit over one year ago, he had 2 seizures, one was 2 months ago and another was about one year ago, both were probably CPS. He is currently taking Lamotrigine 150 mg bid and Depakote 500 - 1000.     2. Autistic disorder.    3. Sleep disturbances.      PLAN:    1. Continue Lamotrigine 150 mg bid.  2. Increase Depakote 1000 - 1000.  3. Check Labs in 2 months.  4. Return to clinic in 3 months. Other options include Vimpat, OXC, etc.    32 min total was spent on the visit.       24 min was spent on face to face time  4 min was spent on preparation to review charts and labs  4 min was spent on documentation of clinical information   unintentional

## 2025-03-03 NOTE — PHYSICAL THERAPY INITIAL EVALUATION ADULT - PERTINENT HX OF CURRENT PROBLEM, REHAB EVAL
68 y/o Male with PMH of COPD (On 3L Home O2), HLD and DM presents to  ED complaining of shortness of breath with:    1. Acute Hypoxic Respiratory Failure   2. Acute COPD Exacerbation

## 2025-03-03 NOTE — PROGRESS NOTE ADULT - SUBJECTIVE AND OBJECTIVE BOX
Patient is a 69y old  Male who presents with a chief complaint of Chronic obstructive pulmonary disease with acute exacerbation     (03 Mar 2025 10:58)    pt seen and  examine today  sob improving , 4 lit nc  93  [ home o2  3 lit nc ] , no cp , no distress ,no fever.   INTERVAL HPI/OVERNIGHT EVENTS:     T(C): 36.6 (03-03-25 @ 07:57), Max: 37 (03-02-25 @ 23:41)  HR: 82 (03-03-25 @ 07:50) (68 - 100)  BP: 102/86 (03-03-25 @ 07:00) (86/59 - 154/92)  RR: 21 (03-03-25 @ 06:00) (20 - 26)  SpO2: 94% (03-03-25 @ 07:50) (93% - 99%)  Wt(kg): --  I&O's Summary    02 Mar 2025 07:01  -  03 Mar 2025 07:00  --------------------------------------------------------  IN: 995.9 mL / OUT: 1450 mL / NET: -454.1 mL        REVIEW OF SYSTEMS:  CONSTITUTIONAL: No fever, weight loss, or fatigue  EYES: No eye pain, visual disturbances, or discharge  ENMT:  No difficulty hearing, tinnitus, vertigo; No sinus or throat pain  NECK: No pain or stiffness  RESPIRATORY: No cough, wheezing, chills   No shortness of breath  CARDIOVASCULAR: No chest pain, palpitations, dizziness, or leg swelling  GASTROINTESTINAL: No abdominal or epigastric pain. No nausea, vomiting,  No diarrhea or constipation.    GENITOURINARY: No dysuria, frequency, hematuria, or incontinence  NEUROLOGICAL: No headaches, memory loss, loss of strength, numbness, or tremors  SKIN: No itching, burning, rashes, or lesions   MUSCULOSKELETAL: No joint pain or swelling; No muscle, back, or extremity pain    PHYSICAL EXAM:  GENERAL: NAD,    HEAD:  Atraumatic, Normocephalic  EYES: EOMI, PERRLA, conjunctiva and sclera clear  ENMT:  Moist mucous membranes  NECK: Supple, No JVD  NERVOUS SYSTEM:  Alert & Oriented X3; Motor Strength 5/5 B/L upper and lower extremities; DTRs 2+ intact and symmetric  CHEST/LUNG:   percussion bilaterally; No rales,   + rhonchi,  no wheezing,   HEART: Regular rate and rhythm; No murmurs,   ABDOMEN: Soft, Nontender, Nondistended; Bowel sounds present  EXTREMITIES:  2+ Peripheral Pulses, No clubbing, cyanosis, or edema  SKIN: No rashes or lesions    MEDICATIONS  (STANDING):  albuterol/ipratropium for Nebulization 3 milliLiter(s) Nebulizer every 6 hours  atorvastatin 10 milliGRAM(s) Oral at bedtime  azithromycin   Tablet 500 milliGRAM(s) Oral once  buDESOnide    Inhalation Suspension 0.5 milliGRAM(s) Inhalation every 12 hours  cefTRIAXone   IVPB 1000 milliGRAM(s) IV Intermittent every 24 hours  chlorhexidine 2% Cloths 1 Application(s) Topical <User Schedule>  dextrose 5%. 1000 milliLiter(s) (100 mL/Hr) IV Continuous <Continuous>  dextrose 5%. 1000 milliLiter(s) (50 mL/Hr) IV Continuous <Continuous>  dextrose 50% Injectable 25 Gram(s) IV Push once  dextrose 50% Injectable 12.5 Gram(s) IV Push once  dextrose 50% Injectable 25 Gram(s) IV Push once  enoxaparin Injectable 40 milliGRAM(s) SubCutaneous every 24 hours  glucagon  Injectable 1 milliGRAM(s) IntraMuscular once  insulin lispro (ADMELOG) corrective regimen sliding scale   SubCutaneous three times a day before meals  insulin lispro (ADMELOG) corrective regimen sliding scale   SubCutaneous at bedtime  pantoprazole  Injectable 40 milliGRAM(s) IV Push daily  predniSONE   Tablet 50 milliGRAM(s) Oral daily    MEDICATIONS  (PRN):  acetaminophen     Tablet .. 650 milliGRAM(s) Oral every 6 hours PRN Temp greater or equal to 38C (100.4F)  albuterol    90 MICROgram(s) HFA Inhaler 2 Puff(s) Inhalation every 2 hours PRN Shortness of Breath and/or Wheezing  dextrose Oral Gel 15 Gram(s) Oral once PRN Blood Glucose LESS THAN 70 milliGRAM(s)/deciliter      LABS:                        12.1   18.14 )-----------( 380      ( 03 Mar 2025 05:50 )             37.0     03-03    139  |  106  |  22  ----------------------------<  143[H]  4.3   |  25  |  1.00    Ca    8.2[L]      03 Mar 2025 05:50  Phos  2.5     03-03  Mg     2.4     03-03    TPro  7.0  /  Alb  2.9[L]  /  TBili  0.4  /  DBili  x   /  AST  30  /  ALT  70  /  AlkPhos  70  03-03      Urinalysis Basic - ( 03 Mar 2025 05:50 )    Color: x / Appearance: x / SG: x / pH: x  Gluc: 143 mg/dL / Ketone: x  / Bili: x / Urobili: x   Blood: x / Protein: x / Nitrite: x   Leuk Esterase: x / RBC: x / WBC x   Sq Epi: x / Non Sq Epi: x / Bacteria: x      CAPILLARY BLOOD GLUCOSE      POCT Blood Glucose.: 105 mg/dL (03 Mar 2025 08:27)  POCT Blood Glucose.: 202 mg/dL (02 Mar 2025 21:32)  POCT Blood Glucose.: 191 mg/dL (02 Mar 2025 17:16)      03-01 @ 07:45   No growth at 24 hours  --  --  03-01 @ 07:40   No growth at 24 hours  --  --          RADIOLOGY & ADDITIONAL TESTS:    Imaging Personally Reviewed:       Advance Directives:      Palliative Care:  Appropriate     Patient is a 69y old  Male who presents with a chief complaint of Chronic obstructive pulmonary disease with acute exacerbation     (03 Mar 2025 10:58)  INTERVAL HPI/OVERNIGHT EVENTS: pt seen and  examine today  sob improving , 4 lit nc  93  [ home o2  3 lit nc ] , no cp , no distress ,no fever. increased wbc today but likely 2/2 to steroids.     T(C): 36.6 (03-03-25 @ 07:57), Max: 37 (03-02-25 @ 23:41)  HR: 82 (03-03-25 @ 07:50) (68 - 100)  BP: 102/86 (03-03-25 @ 07:00) (86/59 - 154/92)  RR: 21 (03-03-25 @ 06:00) (20 - 26)  SpO2: 94% (03-03-25 @ 07:50) (93% - 99%)  Wt(kg): --  I&O's Summary    02 Mar 2025 07:01  -  03 Mar 2025 07:00  --------------------------------------------------------  IN: 995.9 mL / OUT: 1450 mL / NET: -454.1 mL    PHYSICAL EXAM:  Physical Exam:   Gen: well appearing, eating lunch, appears stated age; [place]  Neuro:  AAOX3  HEENT: NC/AT, EOMI.  Resp: Slight expiratory wheezes L lung base, otherwise CTA BL. No accessory m. use. Speaking in full sentences  CVS: +s1, s2. RRR  Abd: Soft NT ND, Bowel sounds present  Ext: No clubbing cyanosis or edema  Skin: WWP    MEDICATIONS  (STANDING):  albuterol/ipratropium for Nebulization 3 milliLiter(s) Nebulizer every 6 hours  atorvastatin 10 milliGRAM(s) Oral at bedtime  azithromycin   Tablet 500 milliGRAM(s) Oral once  buDESOnide    Inhalation Suspension 0.5 milliGRAM(s) Inhalation every 12 hours  cefTRIAXone   IVPB 1000 milliGRAM(s) IV Intermittent every 24 hours  chlorhexidine 2% Cloths 1 Application(s) Topical <User Schedule>  dextrose 5%. 1000 milliLiter(s) (100 mL/Hr) IV Continuous <Continuous>  dextrose 5%. 1000 milliLiter(s) (50 mL/Hr) IV Continuous <Continuous>  dextrose 50% Injectable 25 Gram(s) IV Push once  dextrose 50% Injectable 12.5 Gram(s) IV Push once  dextrose 50% Injectable 25 Gram(s) IV Push once  enoxaparin Injectable 40 milliGRAM(s) SubCutaneous every 24 hours  glucagon  Injectable 1 milliGRAM(s) IntraMuscular once  insulin lispro (ADMELOG) corrective regimen sliding scale   SubCutaneous three times a day before meals  insulin lispro (ADMELOG) corrective regimen sliding scale   SubCutaneous at bedtime  pantoprazole  Injectable 40 milliGRAM(s) IV Push daily  predniSONE   Tablet 50 milliGRAM(s) Oral daily    MEDICATIONS  (PRN):  acetaminophen     Tablet .. 650 milliGRAM(s) Oral every 6 hours PRN Temp greater or equal to 38C (100.4F)  albuterol    90 MICROgram(s) HFA Inhaler 2 Puff(s) Inhalation every 2 hours PRN Shortness of Breath and/or Wheezing  dextrose Oral Gel 15 Gram(s) Oral once PRN Blood Glucose LESS THAN 70 milliGRAM(s)/deciliter      LABS:                        12.1   18.14 )-----------( 380      ( 03 Mar 2025 05:50 )             37.0     03-03    139  |  106  |  22  ----------------------------<  143[H]  4.3   |  25  |  1.00    Ca    8.2[L]      03 Mar 2025 05:50  Phos  2.5     03-03  Mg     2.4     03-03    TPro  7.0  /  Alb  2.9[L]  /  TBili  0.4  /  DBili  x   /  AST  30  /  ALT  70  /  AlkPhos  70  03-03      Urinalysis Basic - ( 03 Mar 2025 05:50 )    Color: x / Appearance: x / SG: x / pH: x  Gluc: 143 mg/dL / Ketone: x  / Bili: x / Urobili: x   Blood: x / Protein: x / Nitrite: x   Leuk Esterase: x / RBC: x / WBC x   Sq Epi: x / Non Sq Epi: x / Bacteria: x      CAPILLARY BLOOD GLUCOSE      POCT Blood Glucose.: 105 mg/dL (03 Mar 2025 08:27)  POCT Blood Glucose.: 202 mg/dL (02 Mar 2025 21:32)  POCT Blood Glucose.: 191 mg/dL (02 Mar 2025 17:16)      03-01 @ 07:45   No growth at 24 hours  --  --  03-01 @ 07:40   No growth at 24 hours  --  --          RADIOLOGY & ADDITIONAL TESTS:    Imaging Personally Reviewed:       Advance Directives:      Palliative Care:  Appropriate

## 2025-03-03 NOTE — CARE COORDINATION ASSESSMENT. - REASON FOR CONSULT
SW met with pt at bedside in order to conduct assessment and to discuss SW roles with good understanding./coordination of care/discharge planning

## 2025-03-03 NOTE — CARE COORDINATION ASSESSMENT. - REFERRAL INFORMATION CONCERNS
Consult received for (concern for living situation), pt mentioned that he lives in a trailer home next to his job. The patient added that he has been living in the trailer for 30 years, the pt eats mostly outside and he has a microwave and bathroom in the trailer. The pt said that he showers at [employer]. Pt's RN from insurance company reportedly visits pt every couple of months and is aware of pt's living situation. Pt didn't express any safety issues/concerns at this time. Consult marked as completed./concerns to be addressed

## 2025-03-03 NOTE — DIETITIAN INITIAL EVALUATION ADULT - OTHER INFO
68 y/o Male with PMH of COPD (On 3L Home O2), HLD and DM presents to  ED complaining of shortness of breath. Admitted to ICU for acute hypoxic respiratory failure secondary to acute COPD exacerbation.    Pt A+Ox4 at visit, on O2 via NC. Soft and bite sized diet rx. Pt reports poor appetite/po intake for past ~week due to acute on chronic illness, SOB, cough with minimal po intake past few days pta. Drinks boost low sugar 1 bottle daily for extra protein. Typically consumes 3 meals/day. Most meals out of the house/convenience items ([employer] egg sandwich, arbys, etc). Does try to watch sugars in diet with hx borderline DM. Metfmin pta. Avoids juice and regular soda and has cut back of sweets. Hgba1c 6.6%. UBW ~240-244lbs. Current weight 232.5lbs(3/2). Pt suspects weight loss over the past week. Possible fluid changes with lasix pta. Recommend changing diet to Soft and bite sized, consistent carbohydrate, low na w/ hs snack. Recommend ensure max 8oz daily (strawberry). Food preferences obtained/honored. Verbal diet education provided, pt declined written instructions.

## 2025-03-03 NOTE — CARE COORDINATION ASSESSMENT. - NSDCPLANSERVICES_GEN_ALL_CORE
Pt's new PCP is Dr. Ramírez   Pt's Pharmacy is [facility] in [place]/Same as Prior Admission

## 2025-03-03 NOTE — DIETITIAN INITIAL EVALUATION ADULT - PERTINENT LABORATORY DATA
03-03    139  |  106  |  22  ----------------------------<  143[H]  4.3   |  25  |  1.00    Ca    8.2[L]      03 Mar 2025 05:50  Phos  2.5     03-03  Mg     2.4     03-03    TPro  7.0  /  Alb  2.9[L]  /  TBili  0.4  /  DBili  x   /  AST  30  /  ALT  70  /  AlkPhos  70  03-03  POCT Blood Glucose.: 105 mg/dL (03-03-25 @ 08:27)  A1C with Estimated Average Glucose Result: 6.6 % (03-02-25 @ 05:55)  A1C with Estimated Average Glucose Result: 6.3 % (08-12-24 @ 07:38)  A1C with Estimated Average Glucose Result: 6.2 % (08-11-24 @ 08:10)

## 2025-03-03 NOTE — PROGRESS NOTE ADULT - ATTENDING COMMENTS
pt seen and examine today see  above plan  - admitted to the ICU for acute  on chr  hypoxic respiratory failure 2/2 COPD exacerbation   with superimpose pna with ct chest  new 6mm pulm nodule  pt aware state  its new  .

## 2025-03-03 NOTE — CARE COORDINATION ASSESSMENT. - PATIENT WAS INVOLVED WITH A HOMECARE AGENCY PRIOR TO ADMISSION?
Pt's RN from insurance company reportedly visits pt every couple of months and is aware of pt's living situation./No

## 2025-03-03 NOTE — PHYSICAL THERAPY INITIAL EVALUATION ADULT - ADDITIONAL COMMENTS
Patient reports that he lives alone in an "office trailer," indep with ADLs/amb PTA, 3 JAC, no stairs within unit. Sleeps in a recliner due to difficulty breathing when lying flat.

## 2025-03-03 NOTE — PROGRESS NOTE ADULT - SUBJECTIVE AND OBJECTIVE BOX
Date/Time Patient Seen:  		  Referring MD:   Data Reviewed	       Patient is a 69y old  Male who presents with a chief complaint of Acute hypoxic respiratory failure, COPD exacerbation (02 Mar 2025 20:00)      Subjective/HPI     PAST MEDICAL & SURGICAL HISTORY:  COPD, severe    Asthma    HLD (hyperlipidemia)    Prediabetes    S/P cataract surgery  R eye 6/6/24, L eye 6/20          Medication list         MEDICATIONS  (STANDING):  albuterol/ipratropium for Nebulization 3 milliLiter(s) Nebulizer every 6 hours  atorvastatin 10 milliGRAM(s) Oral at bedtime  buDESOnide    Inhalation Suspension 0.5 milliGRAM(s) Inhalation every 12 hours  cefTRIAXone   IVPB 1000 milliGRAM(s) IV Intermittent every 24 hours  chlorhexidine 2% Cloths 1 Application(s) Topical <User Schedule>  dexMEDEtomidine Infusion 0.4 MICROgram(s)/kG/Hr (7.71 mL/Hr) IV Continuous <Continuous>  dextrose 5%. 1000 milliLiter(s) (100 mL/Hr) IV Continuous <Continuous>  dextrose 5%. 1000 milliLiter(s) (50 mL/Hr) IV Continuous <Continuous>  dextrose 50% Injectable 25 Gram(s) IV Push once  dextrose 50% Injectable 12.5 Gram(s) IV Push once  dextrose 50% Injectable 25 Gram(s) IV Push once  enoxaparin Injectable 40 milliGRAM(s) SubCutaneous every 24 hours  glucagon  Injectable 1 milliGRAM(s) IntraMuscular once  insulin lispro (ADMELOG) corrective regimen sliding scale   SubCutaneous three times a day before meals  insulin lispro (ADMELOG) corrective regimen sliding scale   SubCutaneous at bedtime  methylPREDNISolone sodium succinate Injectable 40 milliGRAM(s) IV Push every 8 hours  pantoprazole  Injectable 40 milliGRAM(s) IV Push daily  potassium phosphate / sodium phosphate Powder (PHOS-NaK) 1 Packet(s) Oral three times a day    MEDICATIONS  (PRN):  acetaminophen     Tablet .. 650 milliGRAM(s) Oral every 6 hours PRN Temp greater or equal to 38C (100.4F)  albuterol    90 MICROgram(s) HFA Inhaler 2 Puff(s) Inhalation every 2 hours PRN Shortness of Breath and/or Wheezing  dextrose Oral Gel 15 Gram(s) Oral once PRN Blood Glucose LESS THAN 70 milliGRAM(s)/deciliter         Vitals log        ICU Vital Signs Last 24 Hrs  T(C): 36.3 (03 Mar 2025 04:37), Max: 37 (02 Mar 2025 23:41)  T(F): 97.3 (03 Mar 2025 04:37), Max: 98.6 (02 Mar 2025 23:41)  HR: 100 (03 Mar 2025 04:00) (66 - 100)  BP: 108/76 (03 Mar 2025 04:00) (77/61 - 154/92)  BP(mean): 86 (03 Mar 2025 04:00) (65 - 92)  ABP: --  ABP(mean): --  RR: 22 (03 Mar 2025 03:00) (18 - 26)  SpO2: 94% (03 Mar 2025 04:00) (93% - 100%)    O2 Parameters below as of 03 Mar 2025 04:00  Patient On (Oxygen Delivery Method): nasal cannula  O2 Flow (L/min): 4               Input and Output:  I&O's Detail    01 Mar 2025 07:01  -  02 Mar 2025 07:00  --------------------------------------------------------  IN:    Dexmedetomidine: 187.8 mL    IV PiggyBack: 50 mL    IV PiggyBack: 250 mL    Oral Fluid: 550 mL  Total IN: 1037.8 mL    OUT:    Voided (mL): 350 mL  Total OUT: 350 mL    Total NET: 687.8 mL      02 Mar 2025 07:01  -  03 Mar 2025 04:55  --------------------------------------------------------  IN:    Dexmedetomidine: 95.9 mL    IV PiggyBack: 250 mL    IV PiggyBack: 50 mL    Oral Fluid: 600 mL  Total IN: 995.9 mL    OUT:    Voided (mL): 1450 mL  Total OUT: 1450 mL    Total NET: -454.1 mL          Lab Data                        12.2   11.40 )-----------( 263      ( 02 Mar 2025 05:55 )             36.1     03-02    138  |  108  |  24[H]  ----------------------------<  247[H]  4.1   |  22  |  0.94    Ca    8.4[L]      02 Mar 2025 05:55  Phos  2.2     03-02  Mg     2.6     03-02    TPro  6.9  /  Alb  2.9[L]  /  TBili  0.3  /  DBili  x   /  AST  29  /  ALT  52  /  AlkPhos  70  03-02    ABG - ( 01 Mar 2025 10:02 )  pH, Arterial: 7.36  pH, Blood: x     /  pCO2: 36    /  pO2: 297   / HCO3: 20    / Base Excess: -5.1  /  SaO2: 100.0                   Review of Systems	      Objective     Physical Examination    heart s1s2  lung dc BS  head nc  head at      Pertinent Lab findings & Imaging      Mark:  NO   Adequate UO     I&O's Detail    01 Mar 2025 07:01  -  02 Mar 2025 07:00  --------------------------------------------------------  IN:    Dexmedetomidine: 187.8 mL    IV PiggyBack: 50 mL    IV PiggyBack: 250 mL    Oral Fluid: 550 mL  Total IN: 1037.8 mL    OUT:    Voided (mL): 350 mL  Total OUT: 350 mL    Total NET: 687.8 mL      02 Mar 2025 07:01  -  03 Mar 2025 04:55  --------------------------------------------------------  IN:    Dexmedetomidine: 95.9 mL    IV PiggyBack: 250 mL    IV PiggyBack: 50 mL    Oral Fluid: 600 mL  Total IN: 995.9 mL    OUT:    Voided (mL): 1450 mL  Total OUT: 1450 mL    Total NET: -454.1 mL               Discussed with:     Cultures:	        Radiology

## 2025-03-03 NOTE — CARE COORDINATION ASSESSMENT. - NSPASTMEDSURGHISTORY_GEN_ALL_CORE_FT
PAST MEDICAL & SURGICAL HISTORY:  COPD, severe      Prediabetes      HLD (hyperlipidemia)      Asthma      S/P cataract surgery  R eye 6/6/24, L eye 6/20

## 2025-03-04 ENCOUNTER — TRANSCRIPTION ENCOUNTER (OUTPATIENT)
Age: 70
End: 2025-03-04

## 2025-03-04 VITALS
HEART RATE: 85 BPM | SYSTOLIC BLOOD PRESSURE: 155 MMHG | DIASTOLIC BLOOD PRESSURE: 76 MMHG | TEMPERATURE: 98 F | RESPIRATION RATE: 23 BRPM | OXYGEN SATURATION: 92 %

## 2025-03-04 LAB
ALBUMIN SERPL ELPH-MCNC: 2.9 G/DL — LOW (ref 3.3–5)
ALP SERPL-CCNC: 68 U/L — SIGNIFICANT CHANGE UP (ref 40–120)
ALT FLD-CCNC: 66 U/L — SIGNIFICANT CHANGE UP (ref 12–78)
ANION GAP SERPL CALC-SCNC: 7 MMOL/L — SIGNIFICANT CHANGE UP (ref 5–17)
AST SERPL-CCNC: 26 U/L — SIGNIFICANT CHANGE UP (ref 15–37)
BASOPHILS # BLD AUTO: 0.03 K/UL — SIGNIFICANT CHANGE UP (ref 0–0.2)
BASOPHILS NFR BLD AUTO: 0.2 % — SIGNIFICANT CHANGE UP (ref 0–2)
BILIRUB SERPL-MCNC: 0.3 MG/DL — SIGNIFICANT CHANGE UP (ref 0.2–1.2)
BUN SERPL-MCNC: 21 MG/DL — SIGNIFICANT CHANGE UP (ref 7–23)
CALCIUM SERPL-MCNC: 8.3 MG/DL — LOW (ref 8.5–10.1)
CHLORIDE SERPL-SCNC: 108 MMOL/L — SIGNIFICANT CHANGE UP (ref 96–108)
CO2 SERPL-SCNC: 25 MMOL/L — SIGNIFICANT CHANGE UP (ref 22–31)
CREAT SERPL-MCNC: 0.86 MG/DL — SIGNIFICANT CHANGE UP (ref 0.5–1.3)
EGFR: 93 ML/MIN/1.73M2 — SIGNIFICANT CHANGE UP
EGFR: 93 ML/MIN/1.73M2 — SIGNIFICANT CHANGE UP
EOSINOPHIL # BLD AUTO: 0.01 K/UL — SIGNIFICANT CHANGE UP (ref 0–0.5)
EOSINOPHIL NFR BLD AUTO: 0.1 % — SIGNIFICANT CHANGE UP (ref 0–6)
GLUCOSE BLDC GLUCOMTR-MCNC: 139 MG/DL — HIGH (ref 70–99)
GLUCOSE SERPL-MCNC: 133 MG/DL — HIGH (ref 70–99)
HCT VFR BLD CALC: 36.4 % — LOW (ref 39–50)
HGB BLD-MCNC: 11.9 G/DL — LOW (ref 13–17)
IMM GRANULOCYTES NFR BLD AUTO: 3.2 % — HIGH (ref 0–0.9)
LYMPHOCYTES # BLD AUTO: 1.09 K/UL — SIGNIFICANT CHANGE UP (ref 1–3.3)
LYMPHOCYTES # BLD AUTO: 8.5 % — LOW (ref 13–44)
MAGNESIUM SERPL-MCNC: 2.6 MG/DL — SIGNIFICANT CHANGE UP (ref 1.6–2.6)
MCHC RBC-ENTMCNC: 28.7 PG — SIGNIFICANT CHANGE UP (ref 27–34)
MCHC RBC-ENTMCNC: 32.7 G/DL — SIGNIFICANT CHANGE UP (ref 32–36)
MCV RBC AUTO: 87.9 FL — SIGNIFICANT CHANGE UP (ref 80–100)
MONOCYTES # BLD AUTO: 1.02 K/UL — HIGH (ref 0–0.9)
MONOCYTES NFR BLD AUTO: 7.9 % — SIGNIFICANT CHANGE UP (ref 2–14)
NEUTROPHILS # BLD AUTO: 10.29 K/UL — HIGH (ref 1.8–7.4)
NEUTROPHILS NFR BLD AUTO: 80.1 % — HIGH (ref 43–77)
NRBC BLD AUTO-RTO: 0 /100 WBCS — SIGNIFICANT CHANGE UP (ref 0–0)
PHOSPHATE SERPL-MCNC: 1.5 MG/DL — LOW (ref 2.5–4.5)
PLATELET # BLD AUTO: 342 K/UL — SIGNIFICANT CHANGE UP (ref 150–400)
POTASSIUM SERPL-MCNC: 4.6 MMOL/L — SIGNIFICANT CHANGE UP (ref 3.5–5.3)
POTASSIUM SERPL-SCNC: 4.6 MMOL/L — SIGNIFICANT CHANGE UP (ref 3.5–5.3)
PROT SERPL-MCNC: 6.9 G/DL — SIGNIFICANT CHANGE UP (ref 6–8.3)
RBC # BLD: 4.14 M/UL — LOW (ref 4.2–5.8)
RBC # FLD: 16.6 % — HIGH (ref 10.3–14.5)
SODIUM SERPL-SCNC: 140 MMOL/L — SIGNIFICANT CHANGE UP (ref 135–145)
WBC # BLD: 12.78 K/UL — HIGH (ref 3.8–10.5)
WBC # FLD AUTO: 12.78 K/UL — HIGH (ref 3.8–10.5)

## 2025-03-04 PROCEDURE — 83036 HEMOGLOBIN GLYCOSYLATED A1C: CPT

## 2025-03-04 PROCEDURE — 87640 STAPH A DNA AMP PROBE: CPT

## 2025-03-04 PROCEDURE — 94640 AIRWAY INHALATION TREATMENT: CPT

## 2025-03-04 PROCEDURE — 83605 ASSAY OF LACTIC ACID: CPT

## 2025-03-04 PROCEDURE — 85610 PROTHROMBIN TIME: CPT

## 2025-03-04 PROCEDURE — 83735 ASSAY OF MAGNESIUM: CPT

## 2025-03-04 PROCEDURE — 36600 WITHDRAWAL OF ARTERIAL BLOOD: CPT

## 2025-03-04 PROCEDURE — 97116 GAIT TRAINING THERAPY: CPT

## 2025-03-04 PROCEDURE — 87641 MR-STAPH DNA AMP PROBE: CPT

## 2025-03-04 PROCEDURE — 36415 COLL VENOUS BLD VENIPUNCTURE: CPT

## 2025-03-04 PROCEDURE — 93306 TTE W/DOPPLER COMPLETE: CPT

## 2025-03-04 PROCEDURE — 93005 ELECTROCARDIOGRAM TRACING: CPT

## 2025-03-04 PROCEDURE — 82803 BLOOD GASES ANY COMBINATION: CPT

## 2025-03-04 PROCEDURE — 99239 HOSP IP/OBS DSCHRG MGMT >30: CPT | Mod: GC

## 2025-03-04 PROCEDURE — 99291 CRITICAL CARE FIRST HOUR: CPT

## 2025-03-04 PROCEDURE — 71275 CT ANGIOGRAPHY CHEST: CPT | Mod: MC

## 2025-03-04 PROCEDURE — 80053 COMPREHEN METABOLIC PANEL: CPT

## 2025-03-04 PROCEDURE — 87449 NOS EACH ORGANISM AG IA: CPT

## 2025-03-04 PROCEDURE — 97162 PT EVAL MOD COMPLEX 30 MIN: CPT

## 2025-03-04 PROCEDURE — 85025 COMPLETE CBC W/AUTO DIFF WBC: CPT

## 2025-03-04 PROCEDURE — 97530 THERAPEUTIC ACTIVITIES: CPT

## 2025-03-04 PROCEDURE — 96365 THER/PROPH/DIAG IV INF INIT: CPT

## 2025-03-04 PROCEDURE — 85730 THROMBOPLASTIN TIME PARTIAL: CPT

## 2025-03-04 PROCEDURE — 87637 SARSCOV2&INF A&B&RSV AMP PRB: CPT

## 2025-03-04 PROCEDURE — 84100 ASSAY OF PHOSPHORUS: CPT

## 2025-03-04 PROCEDURE — 96375 TX/PRO/DX INJ NEW DRUG ADDON: CPT

## 2025-03-04 PROCEDURE — 87040 BLOOD CULTURE FOR BACTERIA: CPT

## 2025-03-04 PROCEDURE — 94760 N-INVAS EAR/PLS OXIMETRY 1: CPT

## 2025-03-04 PROCEDURE — 71045 X-RAY EXAM CHEST 1 VIEW: CPT

## 2025-03-04 PROCEDURE — 82962 GLUCOSE BLOOD TEST: CPT

## 2025-03-04 PROCEDURE — 94660 CPAP INITIATION&MGMT: CPT

## 2025-03-04 RX ORDER — PREDNISONE 20 MG/1
1 TABLET ORAL
Qty: 0 | Refills: 0 | DISCHARGE
Start: 2025-03-04

## 2025-03-04 RX ORDER — SOD PHOS DI, MONO/K PHOS MONO 250 MG
1 TABLET ORAL
Qty: 3 | Refills: 0
Start: 2025-03-04

## 2025-03-04 RX ORDER — SOD PHOS DI, MONO/K PHOS MONO 250 MG
1 TABLET ORAL ONCE
Refills: 0 | Status: COMPLETED | OUTPATIENT
Start: 2025-03-04 | End: 2025-03-04

## 2025-03-04 RX ORDER — CEFPODOXIME PROXETIL 200 MG/1
1 TABLET, FILM COATED ORAL
Qty: 6 | Refills: 0
Start: 2025-03-04 | End: 2025-03-06

## 2025-03-04 RX ORDER — PREDNISONE 20 MG/1
1 TABLET ORAL
Qty: 4 | Refills: 0
Start: 2025-03-04 | End: 2025-03-07

## 2025-03-04 RX ORDER — SOD PHOS DI, MONO/K PHOS MONO 250 MG
1 TABLET ORAL
Qty: 0 | Refills: 0 | DISCHARGE
Start: 2025-03-04

## 2025-03-04 RX ADMIN — Medication 5 MILLIGRAM(S): at 02:07

## 2025-03-04 RX ADMIN — PREDNISONE 50 MILLIGRAM(S): 20 TABLET ORAL at 05:07

## 2025-03-04 RX ADMIN — Medication 1 PACKET(S): at 12:41

## 2025-03-04 RX ADMIN — CEFTRIAXONE 100 MILLIGRAM(S): 500 INJECTION, POWDER, FOR SOLUTION INTRAMUSCULAR; INTRAVENOUS at 12:39

## 2025-03-04 RX ADMIN — ENOXAPARIN SODIUM 40 MILLIGRAM(S): 100 INJECTION SUBCUTANEOUS at 12:40

## 2025-03-04 RX ADMIN — BUDESONIDE 0.5 MILLIGRAM(S): 0.25 SUSPENSION RESPIRATORY (INHALATION) at 07:51

## 2025-03-04 RX ADMIN — Medication 40 MILLIGRAM(S): at 12:41

## 2025-03-04 RX ADMIN — IPRATROPIUM BROMIDE AND ALBUTEROL SULFATE 3 MILLILITER(S): .5; 2.5 SOLUTION RESPIRATORY (INHALATION) at 01:26

## 2025-03-04 RX ADMIN — IPRATROPIUM BROMIDE AND ALBUTEROL SULFATE 3 MILLILITER(S): .5; 2.5 SOLUTION RESPIRATORY (INHALATION) at 07:51

## 2025-03-04 NOTE — PROGRESS NOTE ADULT - SUBJECTIVE AND OBJECTIVE BOX
Date/Time Patient Seen:  		  Referring MD:   Data Reviewed	       Patient is a 69y old  Male who presents with a chief complaint of Acute hypoxic respiratory failure, COPD exacerbation (03 Mar 2025 11:58)      Subjective/HPI     PAST MEDICAL & SURGICAL HISTORY:  COPD, severe    Asthma    HLD (hyperlipidemia)    Prediabetes    S/P cataract surgery  R eye 6/6/24, L eye 6/20          Medication list         MEDICATIONS  (STANDING):  albuterol/ipratropium for Nebulization 3 milliLiter(s) Nebulizer every 6 hours  atorvastatin 10 milliGRAM(s) Oral at bedtime  buDESOnide    Inhalation Suspension 0.5 milliGRAM(s) Inhalation every 12 hours  cefTRIAXone   IVPB 1000 milliGRAM(s) IV Intermittent every 24 hours  chlorhexidine 2% Cloths 1 Application(s) Topical <User Schedule>  dextrose 5%. 1000 milliLiter(s) (50 mL/Hr) IV Continuous <Continuous>  dextrose 5%. 1000 milliLiter(s) (100 mL/Hr) IV Continuous <Continuous>  dextrose 50% Injectable 25 Gram(s) IV Push once  dextrose 50% Injectable 12.5 Gram(s) IV Push once  dextrose 50% Injectable 25 Gram(s) IV Push once  enoxaparin Injectable 40 milliGRAM(s) SubCutaneous every 24 hours  glucagon  Injectable 1 milliGRAM(s) IntraMuscular once  insulin lispro (ADMELOG) corrective regimen sliding scale   SubCutaneous three times a day before meals  insulin lispro (ADMELOG) corrective regimen sliding scale   SubCutaneous at bedtime  melatonin 5 milliGRAM(s) Oral at bedtime  pantoprazole  Injectable 40 milliGRAM(s) IV Push daily  predniSONE   Tablet 50 milliGRAM(s) Oral daily    MEDICATIONS  (PRN):  acetaminophen     Tablet .. 650 milliGRAM(s) Oral every 6 hours PRN Temp greater or equal to 38C (100.4F)  albuterol    90 MICROgram(s) HFA Inhaler 2 Puff(s) Inhalation every 2 hours PRN Shortness of Breath and/or Wheezing  dextrose Oral Gel 15 Gram(s) Oral once PRN Blood Glucose LESS THAN 70 milliGRAM(s)/deciliter         Vitals log        ICU Vital Signs Last 24 Hrs  T(C): 36.7 (04 Mar 2025 04:22), Max: 36.7 (03 Mar 2025 11:43)  T(F): 98 (04 Mar 2025 04:22), Max: 98.1 (03 Mar 2025 11:43)  HR: 79 (04 Mar 2025 04:22) (77 - 109)  BP: 122/78 (04 Mar 2025 04:22) (102/86 - 156/98)  BP(mean): 117 (03 Mar 2025 14:18) (90 - 117)  ABP: --  ABP(mean): --  RR: 19 (04 Mar 2025 04:22) (19 - 21)  SpO2: 97% (04 Mar 2025 04:22) (77% - 100%)    O2 Parameters below as of 04 Mar 2025 04:22  Patient On (Oxygen Delivery Method): nasal cannula                 Input and Output:  I&O's Detail    02 Mar 2025 07:01  -  03 Mar 2025 07:00  --------------------------------------------------------  IN:    Dexmedetomidine: 95.9 mL    IV PiggyBack: 250 mL    IV PiggyBack: 50 mL    Oral Fluid: 600 mL  Total IN: 995.9 mL    OUT:    Voided (mL): 1450 mL  Total OUT: 1450 mL    Total NET: -454.1 mL          Lab Data                        12.1   18.14 )-----------( 380      ( 03 Mar 2025 05:50 )             37.0     03-03    139  |  106  |  22  ----------------------------<  143[H]  4.3   |  25  |  1.00    Ca    8.2[L]      03 Mar 2025 05:50  Phos  2.5     03-03  Mg     2.4     03-03    TPro  7.0  /  Alb  2.9[L]  /  TBili  0.4  /  DBili  x   /  AST  30  /  ALT  70  /  AlkPhos  70  03-03            Review of Systems	      Objective     Physical Examination    heart s1s2  lung dc bS  head nc  head at      Pertinent Lab findings & Imaging      Mark:  NO   Adequate UO     I&O's Detail    02 Mar 2025 07:01  -  03 Mar 2025 07:00  --------------------------------------------------------  IN:    Dexmedetomidine: 95.9 mL    IV PiggyBack: 250 mL    IV PiggyBack: 50 mL    Oral Fluid: 600 mL  Total IN: 995.9 mL    OUT:    Voided (mL): 1450 mL  Total OUT: 1450 mL    Total NET: -454.1 mL               Discussed with:     Cultures:	        Radiology

## 2025-03-04 NOTE — DISCHARGE NOTE NURSING/CASE MANAGEMENT/SOCIAL WORK - FINANCIAL ASSISTANCE
[facility] provides services at a reduced cost to those who are determined to be eligible through [facility]’s financial assistance program. Information regarding [facility]’s financial assistance program can be found by going to [email or web address] or by calling [phone].

## 2025-03-04 NOTE — PROGRESS NOTE ADULT - REASON FOR ADMISSION
Acute hypoxic respiratory failure, COPD exacerbation

## 2025-03-04 NOTE — DISCHARGE NOTE NURSING/CASE MANAGEMENT/SOCIAL WORK - NSPROEXTENSIONSOFSELF_GEN_A_NUR
clothes, shoes clothes, shoes/none clothes, shoes  Rollator walker was delivered to the patient/walker clothes, shoes  Rollator walker was delivered to the patient at the bedside.    Portable oxygen is at the bedside- Patient brought that from home./walker

## 2025-03-04 NOTE — PHARMACOTHERAPY INTERVENTION NOTE - COMMENTS
Admission counseling - discussed current and new medications with the patient at bedside including indications, directions, and adverse effects to monitor. Patient verbalized understanding and had no further questions.
70 yo male ordered for ceftriaxone 1 g q 24 and azithromycin 500 mg q 24 for empiric CAP treatment. Legionella resulted as negative, recommended d/c azithromycin. Order d/c per discussion with Dr Gibbs.

## 2025-03-04 NOTE — CASE MANAGEMENT PROGRESS NOTE - NSCMPROGRESSNOTE_GEN_ALL_CORE
Per MD, patient is medically cleared for discharge home today. RW has been ordered and delivered by [facility] representative today to the bedside. CM met with the patient at the bedside to discuss transition planning for today. CM discussed home care services for a visiting nurse/PT. Home care choice list provided. Patient declined and stated he works and will follow up with his outpatient providers. Patient stated he has his portable oxygen here to transport home with and that he has family to transport him home today. IMM reviewed/signed/copy provided to the patient. Patient verbalized understanding of the transition plan and is in agreement. CM remains available.

## 2025-03-04 NOTE — PROGRESS NOTE ADULT - PROVIDER SPECIALTY LIST ADULT
Critical Care
Critical Care
Hospitalist
Pulmonology
Critical Care
Hospitalist
Hospitalist
Pulmonology
Critical Care

## 2025-03-04 NOTE — DISCHARGE NOTE NURSING/CASE MANAGEMENT/SOCIAL WORK - PATIENT PORTAL LINK FT
You can access the FollowMyHealth Patient Portal offered by [facility] by registering at the following website: [email or web address]. By joining [facility]’s FollowMyHealth portal, you will also be able to view your health information using other applications (apps) compatible with our system.

## 2025-03-04 NOTE — PROGRESS NOTE ADULT - ASSESSMENT
68 yo M with PMH of COPD/asthma (on 3L home O2), HLD and prediabetes presents to the ED with worsening shortness of breath. Patient was at [facility] 3 weeks prior with SOB, started on PO steroids with initial improvement but then worsening of symptoms. He is dyspneic     copd  asthma  resp distress  DM  PNA eval    transferred out of ICU  plan for RW and Home PT  vs noted  CM f/u    NIV use as needed  fio2 wean  I yen  CT chest imaging reviewed  Oral and skin care - hygiene  cx - biomarkers  systemic steroids and biomarkers - for COPD component  ICU care reviewed   monitor VS and HD and Sat

## 2025-03-06 LAB
CULTURE RESULTS: SIGNIFICANT CHANGE UP
CULTURE RESULTS: SIGNIFICANT CHANGE UP
SPECIMEN SOURCE: SIGNIFICANT CHANGE UP
SPECIMEN SOURCE: SIGNIFICANT CHANGE UP

## 2025-04-07 NOTE — ED PROVIDER NOTE - IV ALTEPLASE DOOR HIDDEN
MUNIRA Follow-up Assessment    General:  Assessment completed with: Patient  Patient Subjective: I'm better but still a little weak.  Chief Complaint: Hyperglycemia    Progress/Care Plan:  Is the patient progressing as planned?: Yes  Care Plan Update: Pt states that he is feeling better but still a little weak. Bp today was 118/55. Blood sugar was 155. He denied having any further nausea. He denied having any new or worsening symptoms. Pt has an appt today with cardiologist Dr. Shaw at 4:10p.  New Care Plan: Pt will complete appt with Dr. Shaw today. He will continue to monitor his blood pressures and blood sugars. He understands when to return to the ED. He agrees to call NCM should there be any changes or questions.  Frequency/Follow Up Plan: Follow up 1 week (cards 4/7 appt, bp's, bs)     Notes:  Navigator Notes: NCM s/w patient for MUNIRA follow up. He states that he has been feeling better, albeit still a little weak but improved. He denied having any new or worsening symptoms. He confirms he will be going to cardiology appt today. NCM reviewed when to seek medical attention; pt v/u.       
show

## 2025-08-03 ENCOUNTER — TRANSCRIPTION ENCOUNTER (OUTPATIENT)
Age: 70
End: 2025-08-03

## 2025-08-03 ENCOUNTER — EMERGENCY (EMERGENCY)
Facility: HOSPITAL | Age: 70
LOS: 1 days | End: 2025-08-03
Attending: STUDENT IN AN ORGANIZED HEALTH CARE EDUCATION/TRAINING PROGRAM | Admitting: STUDENT IN AN ORGANIZED HEALTH CARE EDUCATION/TRAINING PROGRAM
Payer: SELF-PAY

## 2025-08-03 VITALS
SYSTOLIC BLOOD PRESSURE: 137 MMHG | TEMPERATURE: 97 F | HEART RATE: 105 BPM | OXYGEN SATURATION: 94 % | DIASTOLIC BLOOD PRESSURE: 77 MMHG | WEIGHT: 240.08 LBS | HEIGHT: 72 IN | RESPIRATION RATE: 20 BRPM

## 2025-08-03 VITALS
OXYGEN SATURATION: 97 % | HEART RATE: 98 BPM | DIASTOLIC BLOOD PRESSURE: 76 MMHG | RESPIRATION RATE: 20 BRPM | SYSTOLIC BLOOD PRESSURE: 128 MMHG | TEMPERATURE: 98 F

## 2025-08-03 DIAGNOSIS — Z98.49 CATARACT EXTRACTION STATUS, UNSPECIFIED EYE: Chronic | ICD-10-CM

## 2025-08-03 LAB
ALBUMIN SERPL ELPH-MCNC: 3.7 G/DL — SIGNIFICANT CHANGE UP (ref 3.3–5)
ALP SERPL-CCNC: 89 U/L — SIGNIFICANT CHANGE UP (ref 40–120)
ALT FLD-CCNC: 33 U/L — SIGNIFICANT CHANGE UP (ref 12–78)
ANION GAP SERPL CALC-SCNC: 6 MMOL/L — SIGNIFICANT CHANGE UP (ref 5–17)
APTT BLD: 31 SEC — SIGNIFICANT CHANGE UP (ref 26.1–36.8)
AST SERPL-CCNC: 24 U/L — SIGNIFICANT CHANGE UP (ref 15–37)
BASOPHILS # BLD AUTO: 0.07 K/UL — SIGNIFICANT CHANGE UP (ref 0–0.2)
BASOPHILS NFR BLD AUTO: 0.8 % — SIGNIFICANT CHANGE UP (ref 0–2)
BILIRUB SERPL-MCNC: 0.2 MG/DL — SIGNIFICANT CHANGE UP (ref 0.2–1.2)
BUN SERPL-MCNC: 27 MG/DL — HIGH (ref 7–23)
CALCIUM SERPL-MCNC: 8.4 MG/DL — LOW (ref 8.5–10.1)
CHLORIDE SERPL-SCNC: 111 MMOL/L — HIGH (ref 96–108)
CO2 SERPL-SCNC: 25 MMOL/L — SIGNIFICANT CHANGE UP (ref 22–31)
CREAT SERPL-MCNC: 1.1 MG/DL — SIGNIFICANT CHANGE UP (ref 0.5–1.3)
EGFR: 72 ML/MIN/1.73M2 — SIGNIFICANT CHANGE UP
EGFR: 72 ML/MIN/1.73M2 — SIGNIFICANT CHANGE UP
EOSINOPHIL # BLD AUTO: 0.33 K/UL — SIGNIFICANT CHANGE UP (ref 0–0.5)
EOSINOPHIL NFR BLD AUTO: 3.9 % — SIGNIFICANT CHANGE UP (ref 0–6)
GLUCOSE SERPL-MCNC: 113 MG/DL — HIGH (ref 70–99)
HCT VFR BLD CALC: 40.1 % — SIGNIFICANT CHANGE UP (ref 39–50)
HGB BLD-MCNC: 13.2 G/DL — SIGNIFICANT CHANGE UP (ref 13–17)
IMM GRANULOCYTES # BLD AUTO: 0.04 K/UL — SIGNIFICANT CHANGE UP (ref 0–0.07)
IMM GRANULOCYTES NFR BLD AUTO: 0.5 % — SIGNIFICANT CHANGE UP (ref 0–0.9)
INR BLD: 0.91 RATIO — SIGNIFICANT CHANGE UP (ref 0.85–1.16)
LYMPHOCYTES # BLD AUTO: 2.22 K/UL — SIGNIFICANT CHANGE UP (ref 1–3.3)
LYMPHOCYTES NFR BLD AUTO: 26 % — SIGNIFICANT CHANGE UP (ref 13–44)
MAGNESIUM SERPL-MCNC: 1.9 MG/DL — SIGNIFICANT CHANGE UP (ref 1.6–2.6)
MCHC RBC-ENTMCNC: 28.8 PG — SIGNIFICANT CHANGE UP (ref 27–34)
MCHC RBC-ENTMCNC: 32.9 G/DL — SIGNIFICANT CHANGE UP (ref 32–36)
MCV RBC AUTO: 87.6 FL — SIGNIFICANT CHANGE UP (ref 80–100)
MONOCYTES # BLD AUTO: 0.76 K/UL — SIGNIFICANT CHANGE UP (ref 0–0.9)
MONOCYTES NFR BLD AUTO: 8.9 % — SIGNIFICANT CHANGE UP (ref 2–14)
NEUTROPHILS # BLD AUTO: 5.12 K/UL — SIGNIFICANT CHANGE UP (ref 1.8–7.4)
NEUTROPHILS NFR BLD AUTO: 59.9 % — SIGNIFICANT CHANGE UP (ref 43–77)
NRBC # BLD AUTO: 0 K/UL — SIGNIFICANT CHANGE UP (ref 0–0)
NRBC # FLD: 0 K/UL — SIGNIFICANT CHANGE UP (ref 0–0)
NRBC BLD AUTO-RTO: 0 /100 WBCS — SIGNIFICANT CHANGE UP (ref 0–0)
PLATELET # BLD AUTO: 201 K/UL — SIGNIFICANT CHANGE UP (ref 150–400)
PMV BLD: 10.2 FL — SIGNIFICANT CHANGE UP (ref 7–13)
POTASSIUM SERPL-MCNC: 4.6 MMOL/L — SIGNIFICANT CHANGE UP (ref 3.5–5.3)
POTASSIUM SERPL-SCNC: 4.6 MMOL/L — SIGNIFICANT CHANGE UP (ref 3.5–5.3)
PROT SERPL-MCNC: 6.6 G/DL — SIGNIFICANT CHANGE UP (ref 6–8.3)
PROTHROM AB SERPL-ACNC: 10.7 SEC — SIGNIFICANT CHANGE UP (ref 9.9–13.4)
RBC # BLD: 4.58 M/UL — SIGNIFICANT CHANGE UP (ref 4.2–5.8)
RBC # FLD: 15.3 % — HIGH (ref 10.3–14.5)
SODIUM SERPL-SCNC: 142 MMOL/L — SIGNIFICANT CHANGE UP (ref 135–145)
WBC # BLD: 8.54 K/UL — SIGNIFICANT CHANGE UP (ref 3.8–10.5)
WBC # FLD AUTO: 8.54 K/UL — SIGNIFICANT CHANGE UP (ref 3.8–10.5)

## 2025-08-03 PROCEDURE — 71045 X-RAY EXAM CHEST 1 VIEW: CPT | Mod: 26

## 2025-08-03 PROCEDURE — 99285 EMERGENCY DEPT VISIT HI MDM: CPT | Mod: 25

## 2025-08-03 PROCEDURE — 86900 BLOOD TYPING SEROLOGIC ABO: CPT

## 2025-08-03 PROCEDURE — 85025 COMPLETE CBC W/AUTO DIFF WBC: CPT

## 2025-08-03 PROCEDURE — 73130 X-RAY EXAM OF HAND: CPT

## 2025-08-03 PROCEDURE — 83735 ASSAY OF MAGNESIUM: CPT

## 2025-08-03 PROCEDURE — 71045 X-RAY EXAM CHEST 1 VIEW: CPT

## 2025-08-03 PROCEDURE — 96365 THER/PROPH/DIAG IV INF INIT: CPT | Mod: XU

## 2025-08-03 PROCEDURE — 99053 MED SERV 10PM-8AM 24 HR FAC: CPT

## 2025-08-03 PROCEDURE — 86850 RBC ANTIBODY SCREEN: CPT

## 2025-08-03 PROCEDURE — 85730 THROMBOPLASTIN TIME PARTIAL: CPT

## 2025-08-03 PROCEDURE — 26350 REPAIR FINGER/HAND TENDON: CPT | Mod: F7

## 2025-08-03 PROCEDURE — 86901 BLOOD TYPING SEROLOGIC RH(D): CPT

## 2025-08-03 PROCEDURE — 85610 PROTHROMBIN TIME: CPT

## 2025-08-03 PROCEDURE — 36415 COLL VENOUS BLD VENIPUNCTURE: CPT

## 2025-08-03 PROCEDURE — 11012 DEB SKIN BONE AT FX SITE: CPT

## 2025-08-03 PROCEDURE — 14040 TIS TRNFR F/C/C/M/N/A/G/H/F: CPT

## 2025-08-03 PROCEDURE — 73130 X-RAY EXAM OF HAND: CPT | Mod: 26,RT

## 2025-08-03 PROCEDURE — 26418 REPAIR FINGER TENDON: CPT | Mod: F7

## 2025-08-03 PROCEDURE — 99285 EMERGENCY DEPT VISIT HI MDM: CPT

## 2025-08-03 PROCEDURE — 96375 TX/PRO/DX INJ NEW DRUG ADDON: CPT | Mod: XU

## 2025-08-03 PROCEDURE — 80053 COMPREHEN METABOLIC PANEL: CPT

## 2025-08-03 RX ORDER — AMOXICILLIN AND CLAVULANATE POTASSIUM 500; 125 MG/1; MG/1
1 TABLET, FILM COATED ORAL
Qty: 20 | Refills: 0
Start: 2025-08-03

## 2025-08-03 RX ORDER — IPRATROPIUM BROMIDE AND ALBUTEROL SULFATE .5; 2.5 MG/3ML; MG/3ML
3 SOLUTION RESPIRATORY (INHALATION) ONCE
Refills: 0 | Status: DISCONTINUED | OUTPATIENT
Start: 2025-08-03 | End: 2025-08-06

## 2025-08-03 RX ORDER — OXYCODONE HYDROCHLORIDE AND ACETAMINOPHEN 10; 325 MG/1; MG/1
1 TABLET ORAL
Qty: 20 | Refills: 0
Start: 2025-08-03

## 2025-08-03 RX ORDER — AMPICILLIN SODIUM AND SULBACTAM SODIUM 1; .5 G/1; G/1
3 INJECTION, POWDER, FOR SOLUTION INTRAMUSCULAR; INTRAVENOUS ONCE
Refills: 0 | Status: COMPLETED | OUTPATIENT
Start: 2025-08-03 | End: 2025-08-03

## 2025-08-03 RX ADMIN — AMPICILLIN SODIUM AND SULBACTAM SODIUM 3 GRAM(S): 1; .5 INJECTION, POWDER, FOR SOLUTION INTRAMUSCULAR; INTRAVENOUS at 08:08

## 2025-08-03 RX ADMIN — Medication 4 MILLIGRAM(S): at 07:36

## 2025-08-03 RX ADMIN — AMPICILLIN SODIUM AND SULBACTAM SODIUM 200 GRAM(S): 1; .5 INJECTION, POWDER, FOR SOLUTION INTRAMUSCULAR; INTRAVENOUS at 07:36

## 2025-08-03 RX ADMIN — Medication 4 MILLIGRAM(S): at 08:13

## 2025-08-12 ENCOUNTER — EMERGENCY (EMERGENCY)
Facility: HOSPITAL | Age: 70
LOS: 1 days | End: 2025-08-12
Attending: STUDENT IN AN ORGANIZED HEALTH CARE EDUCATION/TRAINING PROGRAM | Admitting: STUDENT IN AN ORGANIZED HEALTH CARE EDUCATION/TRAINING PROGRAM
Payer: SELF-PAY

## 2025-08-12 VITALS
HEART RATE: 70 BPM | WEIGHT: 240.08 LBS | TEMPERATURE: 98 F | SYSTOLIC BLOOD PRESSURE: 136 MMHG | HEIGHT: 72 IN | RESPIRATION RATE: 18 BRPM | OXYGEN SATURATION: 97 % | DIASTOLIC BLOOD PRESSURE: 78 MMHG

## 2025-08-12 DIAGNOSIS — Z98.49 CATARACT EXTRACTION STATUS, UNSPECIFIED EYE: Chronic | ICD-10-CM

## 2025-08-12 LAB
ALBUMIN SERPL ELPH-MCNC: 3.5 G/DL — SIGNIFICANT CHANGE UP (ref 3.3–5)
ALP SERPL-CCNC: 77 U/L — SIGNIFICANT CHANGE UP (ref 40–120)
ALT FLD-CCNC: 29 U/L — SIGNIFICANT CHANGE UP (ref 12–78)
ANION GAP SERPL CALC-SCNC: 5 MMOL/L — SIGNIFICANT CHANGE UP (ref 5–17)
AST SERPL-CCNC: 20 U/L — SIGNIFICANT CHANGE UP (ref 15–37)
BASOPHILS # BLD AUTO: 0.06 K/UL — SIGNIFICANT CHANGE UP (ref 0–0.2)
BASOPHILS NFR BLD AUTO: 0.6 % — SIGNIFICANT CHANGE UP (ref 0–2)
BILIRUB SERPL-MCNC: 0.3 MG/DL — SIGNIFICANT CHANGE UP (ref 0.2–1.2)
BUN SERPL-MCNC: 26 MG/DL — HIGH (ref 7–23)
CALCIUM SERPL-MCNC: 9.1 MG/DL — SIGNIFICANT CHANGE UP (ref 8.5–10.1)
CHLORIDE SERPL-SCNC: 113 MMOL/L — HIGH (ref 96–108)
CO2 SERPL-SCNC: 25 MMOL/L — SIGNIFICANT CHANGE UP (ref 22–31)
CREAT SERPL-MCNC: 1.1 MG/DL — SIGNIFICANT CHANGE UP (ref 0.5–1.3)
EGFR: 72 ML/MIN/1.73M2 — SIGNIFICANT CHANGE UP
EGFR: 72 ML/MIN/1.73M2 — SIGNIFICANT CHANGE UP
EOSINOPHIL # BLD AUTO: 0.29 K/UL — SIGNIFICANT CHANGE UP (ref 0–0.5)
EOSINOPHIL NFR BLD AUTO: 3.1 % — SIGNIFICANT CHANGE UP (ref 0–6)
GLUCOSE SERPL-MCNC: 101 MG/DL — HIGH (ref 70–99)
HCT VFR BLD CALC: 39 % — SIGNIFICANT CHANGE UP (ref 39–50)
HGB BLD-MCNC: 12.9 G/DL — LOW (ref 13–17)
IMM GRANULOCYTES # BLD AUTO: 0.03 K/UL — SIGNIFICANT CHANGE UP (ref 0–0.07)
IMM GRANULOCYTES NFR BLD AUTO: 0.3 % — SIGNIFICANT CHANGE UP (ref 0–0.9)
LYMPHOCYTES # BLD AUTO: 2.48 K/UL — SIGNIFICANT CHANGE UP (ref 1–3.3)
LYMPHOCYTES NFR BLD AUTO: 26.3 % — SIGNIFICANT CHANGE UP (ref 13–44)
MCHC RBC-ENTMCNC: 28.4 PG — SIGNIFICANT CHANGE UP (ref 27–34)
MCHC RBC-ENTMCNC: 33.1 G/DL — SIGNIFICANT CHANGE UP (ref 32–36)
MCV RBC AUTO: 85.7 FL — SIGNIFICANT CHANGE UP (ref 80–100)
MONOCYTES # BLD AUTO: 0.76 K/UL — SIGNIFICANT CHANGE UP (ref 0–0.9)
MONOCYTES NFR BLD AUTO: 8.1 % — SIGNIFICANT CHANGE UP (ref 2–14)
NEUTROPHILS # BLD AUTO: 5.8 K/UL — SIGNIFICANT CHANGE UP (ref 1.8–7.4)
NEUTROPHILS NFR BLD AUTO: 61.6 % — SIGNIFICANT CHANGE UP (ref 43–77)
NRBC # BLD AUTO: 0 K/UL — SIGNIFICANT CHANGE UP (ref 0–0)
NRBC # FLD: 0 K/UL — SIGNIFICANT CHANGE UP (ref 0–0)
NRBC BLD AUTO-RTO: 0 /100 WBCS — SIGNIFICANT CHANGE UP (ref 0–0)
PLATELET # BLD AUTO: 201 K/UL — SIGNIFICANT CHANGE UP (ref 150–400)
PMV BLD: 9.8 FL — SIGNIFICANT CHANGE UP (ref 7–13)
POTASSIUM SERPL-MCNC: 4.3 MMOL/L — SIGNIFICANT CHANGE UP (ref 3.5–5.3)
POTASSIUM SERPL-SCNC: 4.3 MMOL/L — SIGNIFICANT CHANGE UP (ref 3.5–5.3)
PROT SERPL-MCNC: 6.9 G/DL — SIGNIFICANT CHANGE UP (ref 6–8.3)
RBC # BLD: 4.55 M/UL — SIGNIFICANT CHANGE UP (ref 4.2–5.8)
RBC # FLD: 15 % — HIGH (ref 10.3–14.5)
SODIUM SERPL-SCNC: 143 MMOL/L — SIGNIFICANT CHANGE UP (ref 135–145)
WBC # BLD: 9.42 K/UL — SIGNIFICANT CHANGE UP (ref 3.8–10.5)
WBC # FLD AUTO: 9.42 K/UL — SIGNIFICANT CHANGE UP (ref 3.8–10.5)

## 2025-08-12 PROCEDURE — 80053 COMPREHEN METABOLIC PANEL: CPT

## 2025-08-12 PROCEDURE — 73130 X-RAY EXAM OF HAND: CPT | Mod: 26,RT

## 2025-08-12 PROCEDURE — 85025 COMPLETE CBC W/AUTO DIFF WBC: CPT

## 2025-08-12 PROCEDURE — 99284 EMERGENCY DEPT VISIT MOD MDM: CPT

## 2025-08-12 PROCEDURE — 96365 THER/PROPH/DIAG IV INF INIT: CPT

## 2025-08-12 PROCEDURE — 99284 EMERGENCY DEPT VISIT MOD MDM: CPT | Mod: 25

## 2025-08-12 PROCEDURE — 73130 X-RAY EXAM OF HAND: CPT

## 2025-08-12 PROCEDURE — 36415 COLL VENOUS BLD VENIPUNCTURE: CPT

## 2025-08-12 RX ORDER — BACITRACIN 500 UNIT/G
1 OINTMENT (GRAM) TOPICAL ONCE
Refills: 0 | Status: COMPLETED | OUTPATIENT
Start: 2025-08-12 | End: 2025-08-12

## 2025-08-12 RX ORDER — AMOXICILLIN AND CLAVULANATE POTASSIUM 500; 125 MG/1; MG/1
1 TABLET, FILM COATED ORAL
Qty: 14 | Refills: 0
Start: 2025-08-12

## 2025-08-12 RX ORDER — BACITRACIN 500 UNIT/G
1 OINTMENT (GRAM) TOPICAL
Qty: 1 | Refills: 0
Start: 2025-08-12

## 2025-08-12 RX ORDER — AMPICILLIN SODIUM AND SULBACTAM SODIUM 1; .5 G/1; G/1
3 INJECTION, POWDER, FOR SOLUTION INTRAMUSCULAR; INTRAVENOUS ONCE
Refills: 0 | Status: COMPLETED | OUTPATIENT
Start: 2025-08-12 | End: 2025-08-12

## 2025-08-12 RX ADMIN — AMPICILLIN SODIUM AND SULBACTAM SODIUM 200 GRAM(S): 1; .5 INJECTION, POWDER, FOR SOLUTION INTRAMUSCULAR; INTRAVENOUS at 16:05

## 2025-08-12 RX ADMIN — AMPICILLIN SODIUM AND SULBACTAM SODIUM 3 GRAM(S): 1; .5 INJECTION, POWDER, FOR SOLUTION INTRAMUSCULAR; INTRAVENOUS at 17:14

## 2025-08-12 RX ADMIN — Medication 1 APPLICATION(S): at 17:14
